# Patient Record
Sex: MALE | Race: WHITE | NOT HISPANIC OR LATINO | Employment: FULL TIME | URBAN - METROPOLITAN AREA
[De-identification: names, ages, dates, MRNs, and addresses within clinical notes are randomized per-mention and may not be internally consistent; named-entity substitution may affect disease eponyms.]

---

## 2017-03-22 RX ORDER — LISINOPRIL 10 MG/1
TABLET ORAL
Qty: 30 TABLET | Refills: 12 | Status: SHIPPED | OUTPATIENT
Start: 2017-03-22 | End: 2017-12-17 | Stop reason: SDUPTHER

## 2017-03-22 RX ORDER — SIMVASTATIN 20 MG/1
TABLET, FILM COATED ORAL
Qty: 30 TABLET | Refills: 12 | Status: SHIPPED | OUTPATIENT
Start: 2017-03-22 | End: 2017-12-17 | Stop reason: SDUPTHER

## 2017-05-25 DIAGNOSIS — Z00.00 ROUTINE GENERAL MEDICAL EXAMINATION AT A HEALTH CARE FACILITY: Primary | ICD-10-CM

## 2017-07-13 ENCOUNTER — HOSPITAL ENCOUNTER (OUTPATIENT)
Dept: CARDIOLOGY | Facility: CLINIC | Age: 77
Discharge: HOME | End: 2017-07-13
Payer: MEDICARE

## 2017-07-13 ENCOUNTER — HOSPITAL ENCOUNTER (OUTPATIENT)
Dept: RADIOLOGY | Facility: HOSPITAL | Age: 77
Discharge: HOME | End: 2017-07-13
Attending: INTERNAL MEDICINE
Payer: MEDICARE

## 2017-07-13 ENCOUNTER — OFFICE VISIT (OUTPATIENT)
Dept: PULMONOLOGY | Facility: CLINIC | Age: 77
End: 2017-07-13
Payer: MEDICARE

## 2017-07-13 ENCOUNTER — CLINICAL SUPPORT (OUTPATIENT)
Dept: INTERNAL MEDICINE | Facility: CLINIC | Age: 77
End: 2017-07-13
Payer: MEDICARE

## 2017-07-13 VITALS
BODY MASS INDEX: 26.41 KG/M2 | HEART RATE: 63 BPM | HEIGHT: 72 IN | SYSTOLIC BLOOD PRESSURE: 124 MMHG | DIASTOLIC BLOOD PRESSURE: 70 MMHG | WEIGHT: 195 LBS

## 2017-07-13 DIAGNOSIS — Z00.00 ROUTINE GENERAL MEDICAL EXAMINATION AT A HEALTH CARE FACILITY: Primary | ICD-10-CM

## 2017-07-13 DIAGNOSIS — Z13.1 SCREENING FOR DIABETES MELLITUS: ICD-10-CM

## 2017-07-13 DIAGNOSIS — E78.5 OTHER AND UNSPECIFIED HYPERLIPIDEMIA: ICD-10-CM

## 2017-07-13 DIAGNOSIS — Z12.5 SPECIAL SCREENING FOR MALIGNANT NEOPLASM OF PROSTATE: ICD-10-CM

## 2017-07-13 DIAGNOSIS — D64.9 ANEMIA, UNSPECIFIED: ICD-10-CM

## 2017-07-13 DIAGNOSIS — Z00.00 ROUTINE GENERAL MEDICAL EXAMINATION AT A HEALTH CARE FACILITY: ICD-10-CM

## 2017-07-13 DIAGNOSIS — E03.9 UNSPECIFIED HYPOTHYROIDISM: ICD-10-CM

## 2017-07-13 DIAGNOSIS — Z00.00 ANNUAL PHYSICAL EXAM: Primary | ICD-10-CM

## 2017-07-13 DIAGNOSIS — E74.89 OTHER SPECIFIED DISORDERS OF CARBOHYDRATE METABOLISM: ICD-10-CM

## 2017-07-13 LAB
ALBUMIN SERPL BCP-MCNC: 3.7 G/DL
ALP SERPL-CCNC: 63 U/L
ALT SERPL W/O P-5'-P-CCNC: 13 U/L
ANION GAP SERPL CALC-SCNC: 6 MMOL/L
AST SERPL-CCNC: 16 U/L
BILIRUB SERPL-MCNC: 0.8 MG/DL
BUN SERPL-MCNC: 14 MG/DL
CALCIUM SERPL-MCNC: 9.5 MG/DL
CHLORIDE SERPL-SCNC: 105 MMOL/L
CHOLEST/HDLC SERPL: 2.7 {RATIO}
CO2 SERPL-SCNC: 27 MMOL/L
COMPLEXED PSA SERPL-MCNC: <0.01 NG/ML
CREAT SERPL-MCNC: 0.8 MG/DL
ERYTHROCYTE [DISTWIDTH] IN BLOOD BY AUTOMATED COUNT: 12.3 %
EST. GFR  (AFRICAN AMERICAN): >60 ML/MIN/1.73 M^2
EST. GFR  (NON AFRICAN AMERICAN): >60 ML/MIN/1.73 M^2
ESTIMATED AVG GLUCOSE: 111 MG/DL
GLUCOSE SERPL-MCNC: 105 MG/DL
HBA1C MFR BLD HPLC: 5.5 %
HCT VFR BLD AUTO: 43.2 %
HDL/CHOLESTEROL RATIO: 36.9 %
HDLC SERPL-MCNC: 122 MG/DL
HDLC SERPL-MCNC: 45 MG/DL
HGB BLD-MCNC: 14.4 G/DL
LDLC SERPL CALC-MCNC: 63.4 MG/DL
MCH RBC QN AUTO: 32.4 PG
MCHC RBC AUTO-ENTMCNC: 33.3 %
MCV RBC AUTO: 97 FL
NONHDLC SERPL-MCNC: 77 MG/DL
PLATELET # BLD AUTO: 220 K/UL
PMV BLD AUTO: 10.9 FL
POTASSIUM SERPL-SCNC: 4.3 MMOL/L
PROT SERPL-MCNC: 6.6 G/DL
RBC # BLD AUTO: 4.44 M/UL
SODIUM SERPL-SCNC: 138 MMOL/L
TRIGL SERPL-MCNC: 68 MG/DL
TSH SERPL DL<=0.005 MIU/L-ACNC: 1.38 UIU/ML
WBC # BLD AUTO: 5.78 K/UL

## 2017-07-13 PROCEDURE — 99999 PR PBB SHADOW E&M-EST. PATIENT-LVL III: CPT | Mod: PBBFAC,,, | Performed by: INTERNAL MEDICINE

## 2017-07-13 PROCEDURE — 99213 OFFICE O/P EST LOW 20 MIN: CPT | Mod: PBBFAC,25 | Performed by: INTERNAL MEDICINE

## 2017-07-13 PROCEDURE — 99397 PER PM REEVAL EST PAT 65+ YR: CPT | Mod: S$PBB,,, | Performed by: INTERNAL MEDICINE

## 2017-07-13 PROCEDURE — 71020 XR CHEST PA AND LATERAL: CPT | Mod: 26,,, | Performed by: RADIOLOGY

## 2017-07-13 PROCEDURE — 93010 ELECTROCARDIOGRAM REPORT: CPT | Mod: S$PBB,,, | Performed by: INTERNAL MEDICINE

## 2017-07-13 NOTE — PROGRESS NOTES
Subjective:       Patient ID: Oliver Burk Jr. is a 77 y.o. male.    Chief Complaint: Annual Exam    HPI   76 yo  from [place] just celebrated his 77th birthday comes for a periodic check. He feels well , he had a mini discectomy by Dr. Aj several months ago and is doing well. He has radioactive  Seeds for low grade prostate cancer many years ago. He has moderate atophy of his right calf from spinal stenosis but still shoots 80 in gold.  No medical complaints today.  Review of Systems   Constitutional: Negative.    HENT: Negative.    Eyes: Negative.    Respiratory: Negative.    Cardiovascular: Negative.    Gastrointestinal: Negative.    Genitourinary: Negative.         S/P Prostate Cancer treated with radon seeds   Musculoskeletal: Positive for back pain.        S/P Mini discetomy    Mild spinal stenosis with atrophy of right gastrocnemius   Skin: Negative.         MOHS surgery for skin cancer   Neurological: Negative.    Psychiatric/Behavioral: Negative.    All other systems reviewed and are negative.      Objective:      Physical Exam   Constitutional: He is oriented to person, place, and time. He appears well-developed and well-nourished.   HENT:   Head: Normocephalic and atraumatic.   Right Ear: External ear normal.   Left Ear: External ear normal.   Eyes: Conjunctivae and EOM are normal. Pupils are equal, round, and reactive to light.   Neck: Normal range of motion. Neck supple.   Cardiovascular: Normal rate, regular rhythm and normal heart sounds.    Pulmonary/Chest: Effort normal and breath sounds normal.   Peak flow 550 l/min   Abdominal: Soft. Bowel sounds are normal.   Musculoskeletal: Normal range of motion.   Walks with a noticeable limp   Neurological: He is alert and oriented to person, place, and time. He has normal reflexes.   Skin: Skin is warm and dry.   Psychiatric: He has a normal mood and affect. His behavior is normal. Judgment and thought content normal.        Assessment:       No diagnosis found.    Plan:        Labs: all parameters are normal EKG is normal and Chest x-ray is clear. Healthy 76 yo Male. Rosalva Arora

## 2017-07-13 NOTE — LETTER
July 13, 2017    Oliver CAMRON Burk Jr.  [address] [address]             [facility] - Pulmonary Services  [address]  [facility] [address]  Phone: [phone] Dear Jonel,      Thank you for allowing me to serve you and perform your Executive Health exam on 7/13/2017. This letter will serve as a brief summary of the physical findings and laboratory/studies performed and recommendations at this time. Today's assessment is excellent in all respects. You are fit to enter your 78th year.          If you have any questions or concerns, please don't hesitate to call.    Sincerely,        Richar Patterson MD

## 2017-12-17 RX ORDER — LISINOPRIL 10 MG/1
TABLET ORAL
Qty: 30 TABLET | Refills: 12 | Status: SHIPPED | OUTPATIENT
Start: 2017-12-17 | End: 2018-12-18 | Stop reason: SDUPTHER

## 2017-12-17 RX ORDER — TAMSULOSIN HYDROCHLORIDE 0.4 MG/1
CAPSULE ORAL
Qty: 30 CAPSULE | Refills: 12 | Status: SHIPPED | OUTPATIENT
Start: 2017-12-17 | End: 2018-12-18 | Stop reason: SDUPTHER

## 2017-12-17 RX ORDER — SIMVASTATIN 20 MG/1
TABLET, FILM COATED ORAL
Qty: 30 TABLET | Refills: 12 | Status: SHIPPED | OUTPATIENT
Start: 2017-12-17 | End: 2018-12-18 | Stop reason: SDUPTHER

## 2018-05-18 DIAGNOSIS — Z00.00 ROUTINE GENERAL MEDICAL EXAMINATION AT A HEALTH CARE FACILITY: Primary | ICD-10-CM

## 2018-05-29 ENCOUNTER — HOSPITAL ENCOUNTER (OUTPATIENT)
Dept: RADIOLOGY | Facility: HOSPITAL | Age: 78
Discharge: HOME | End: 2018-05-29
Attending: INTERNAL MEDICINE
Payer: MEDICARE

## 2018-05-29 ENCOUNTER — CLINICAL SUPPORT (OUTPATIENT)
Dept: INTERNAL MEDICINE | Facility: CLINIC | Age: 78
End: 2018-05-29
Payer: MEDICARE

## 2018-05-29 ENCOUNTER — HOSPITAL ENCOUNTER (OUTPATIENT)
Dept: CARDIOLOGY | Facility: CLINIC | Age: 78
Discharge: HOME | End: 2018-05-29
Payer: MEDICARE

## 2018-05-29 ENCOUNTER — OFFICE VISIT (OUTPATIENT)
Dept: PULMONOLOGY | Facility: CLINIC | Age: 78
End: 2018-05-29
Payer: MEDICARE

## 2018-05-29 VITALS
HEART RATE: 60 BPM | SYSTOLIC BLOOD PRESSURE: 116 MMHG | WEIGHT: 192 LBS | BODY MASS INDEX: 26.88 KG/M2 | HEIGHT: 71 IN | DIASTOLIC BLOOD PRESSURE: 54 MMHG

## 2018-05-29 DIAGNOSIS — Z12.5 ENCOUNTER FOR SCREENING FOR MALIGNANT NEOPLASM OF PROSTATE: ICD-10-CM

## 2018-05-29 DIAGNOSIS — Z00.00 ROUTINE GENERAL MEDICAL EXAMINATION AT A HEALTH CARE FACILITY: ICD-10-CM

## 2018-05-29 DIAGNOSIS — R79.9 ABNORMAL FINDING OF BLOOD CHEMISTRY: ICD-10-CM

## 2018-05-29 DIAGNOSIS — Z00.00 ROUTINE GENERAL MEDICAL EXAMINATION AT A HEALTH CARE FACILITY: Primary | ICD-10-CM

## 2018-05-29 DIAGNOSIS — Z00.00 ANNUAL PHYSICAL EXAM: Primary | ICD-10-CM

## 2018-05-29 DIAGNOSIS — R73.03 PREDIABETES: ICD-10-CM

## 2018-05-29 LAB
ALBUMIN SERPL BCP-MCNC: 3.8 G/DL
ALP SERPL-CCNC: 60 U/L
ALT SERPL W/O P-5'-P-CCNC: 14 U/L
ANION GAP SERPL CALC-SCNC: 6 MMOL/L
AST SERPL-CCNC: 17 U/L
BILIRUB SERPL-MCNC: 0.7 MG/DL
BUN SERPL-MCNC: 17 MG/DL
CALCIUM SERPL-MCNC: 10 MG/DL
CHLORIDE SERPL-SCNC: 107 MMOL/L
CHOLEST SERPL-MCNC: 129 MG/DL
CHOLEST/HDLC SERPL: 2.8 {RATIO}
CO2 SERPL-SCNC: 27 MMOL/L
COMPLEXED PSA SERPL-MCNC: <0.01 NG/ML
CREAT SERPL-MCNC: 0.8 MG/DL
ERYTHROCYTE [DISTWIDTH] IN BLOOD BY AUTOMATED COUNT: 12 %
EST. GFR  (AFRICAN AMERICAN): >60 ML/MIN/1.73 M^2
EST. GFR  (NON AFRICAN AMERICAN): >60 ML/MIN/1.73 M^2
ESTIMATED AVG GLUCOSE: 108 MG/DL
GLUCOSE SERPL-MCNC: 97 MG/DL
HBA1C MFR BLD HPLC: 5.4 %
HCT VFR BLD AUTO: 42 %
HDLC SERPL-MCNC: 46 MG/DL
HDLC SERPL: 35.7 %
HGB BLD-MCNC: 13.7 G/DL
LDLC SERPL CALC-MCNC: 74 MG/DL
MCH RBC QN AUTO: 32.5 PG
MCHC RBC AUTO-ENTMCNC: 32.6 G/DL
MCV RBC AUTO: 100 FL
NONHDLC SERPL-MCNC: 83 MG/DL
PLATELET # BLD AUTO: 227 K/UL
PMV BLD AUTO: 11 FL
POTASSIUM SERPL-SCNC: 5.3 MMOL/L
PROT SERPL-MCNC: 6.6 G/DL
RBC # BLD AUTO: 4.21 M/UL
SODIUM SERPL-SCNC: 140 MMOL/L
TRIGL SERPL-MCNC: 45 MG/DL
TSH SERPL DL<=0.005 MIU/L-ACNC: 1.53 UIU/ML
WBC # BLD AUTO: 5.35 K/UL

## 2018-05-29 PROCEDURE — 80061 LIPID PANEL: CPT

## 2018-05-29 PROCEDURE — 85027 COMPLETE CBC AUTOMATED: CPT

## 2018-05-29 PROCEDURE — 80053 COMPREHEN METABOLIC PANEL: CPT

## 2018-05-29 PROCEDURE — 84443 ASSAY THYROID STIM HORMONE: CPT

## 2018-05-29 PROCEDURE — 71046 X-RAY EXAM CHEST 2 VIEWS: CPT | Mod: TC,FY

## 2018-05-29 PROCEDURE — 99999 PR PBB SHADOW E&M-EST. PATIENT-LVL III: CPT | Mod: PBBFAC,,, | Performed by: INTERNAL MEDICINE

## 2018-05-29 PROCEDURE — 84153 ASSAY OF PSA TOTAL: CPT

## 2018-05-29 PROCEDURE — 83036 HEMOGLOBIN GLYCOSYLATED A1C: CPT

## 2018-05-29 PROCEDURE — 99397 PER PM REEVAL EST PAT 65+ YR: CPT | Mod: S$PBB,,, | Performed by: INTERNAL MEDICINE

## 2018-05-29 PROCEDURE — 93005 ELECTROCARDIOGRAM TRACING: CPT | Mod: PBBFAC | Performed by: INTERNAL MEDICINE

## 2018-05-29 PROCEDURE — 99213 OFFICE O/P EST LOW 20 MIN: CPT | Mod: PBBFAC,25 | Performed by: INTERNAL MEDICINE

## 2018-05-29 PROCEDURE — 71046 X-RAY EXAM CHEST 2 VIEWS: CPT | Mod: 26,,, | Performed by: RADIOLOGY

## 2018-05-29 PROCEDURE — 93010 ELECTROCARDIOGRAM REPORT: CPT | Mod: S$PBB,,, | Performed by: INTERNAL MEDICINE

## 2018-05-29 NOTE — LETTER
May 29, 2018    Oliver CAMRON Burk Jr.  [address] [address]             [facility] - Pulmonary Services  [address]  [facility] [address]  Phone: [phone] Dear Jonel      Thank you for allowing me to serve you and perform your Executive Health exam on 5/29/2018. This letter will serve as a brief summary of the physical findings and laboratory/studies performed and recommendations at this time. Today's assessment is essentially normal except for the weakness in the lower extremity.  Fit for travel to [place].          If you have any questions or concerns, please don't hesitate to call.    Sincerely,        Richar Patterson MD

## 2018-05-29 NOTE — PROGRESS NOTES
Subjective:       Patient ID: Oliver Burk Jr. is a 77 y.o. male.    Chief Complaint: Annual Exam    HPI  76 yo  and long time friend comes for his periodic health exam. He feels well, exercise daily between gold and using the exercise facility at the [facility]> He has no active medical complaints. He has some problem with balance and a limp due to atrophy of his calf muscle on the right., had a minin discectomy by   last year with good results.This seems to be a consequence of mild spinal stenosis. It does not affect his golf game. Has radon seed implants in [place] for Stage I prostate cancer.  Meds: Lisinopirl 10 mg Flomax 0.4 and Zocor.  Review of Systems   Constitutional: Negative.    HENT: Negative.    Eyes: Negative.    Respiratory: Negative.    Cardiovascular: Negative.    Gastrointestinal: Negative.    Genitourinary: Negative.         Radioactive seeds for small area of prostate cancer  Stage I   Musculoskeletal: Positive for gait problem.        Hx of mini discectomy by Dr. Aj    Atrophy of the right calf.   Skin: Negative.    Psychiatric/Behavioral: Negative.    All other systems reviewed and are negative.      Objective:      Physical Exam   Constitutional: He is oriented to person, place, and time. He appears well-developed and well-nourished.   HENT:   Head: Normocephalic and atraumatic.   Right Ear: External ear normal.   Left Ear: External ear normal.   Eyes: Conjunctivae and EOM are normal. Pupils are equal, round, and reactive to light.   Neck: Normal range of motion. Neck supple.   Cardiovascular: Normal rate, regular rhythm and normal heart sounds.    Pulmonary/Chest: Effort normal and breath sounds normal.   Abdominal: Soft. Bowel sounds are normal.   Musculoskeletal: Normal range of motion.   Atrophy of the right calf muscle   Neurological: He is alert and oriented to person, place, and time. He has normal reflexes.   Skin: Skin is warm and dry.   Psychiatric: He has a  normal mood and affect. His behavior is normal. Judgment and thought content normal.       Assessment:       No diagnosis found.    Plan:        Labs: All parameters are normal. PSA<0.05  Chest x-ray is clear and EKG is normal.IMP: Healthy Male with good health habits.

## 2018-07-24 ENCOUNTER — TELEPHONE (OUTPATIENT)
Dept: OPHTHALMOLOGY | Facility: CLINIC | Age: 78
End: 2018-07-24

## 2018-07-24 DIAGNOSIS — H26.9 CATARACT, UNSPECIFIED CATARACT TYPE, UNSPECIFIED LATERALITY: Primary | ICD-10-CM

## 2018-07-24 NOTE — TELEPHONE ENCOUNTER
----- Message from Glory Mitchell sent at 7/24/2018  3:10 PM CDT -----  Contact: Oliver Burk Jr.  Pt would like to speak with  nurse to scheduled the referral please as soon as possible,  pt can be reached at [phone] please thank you.

## 2018-10-05 DIAGNOSIS — S76.302A HAMSTRING INJURY, LEFT, INITIAL ENCOUNTER: Primary | ICD-10-CM

## 2018-10-10 ENCOUNTER — OFFICE VISIT (OUTPATIENT)
Dept: OPHTHALMOLOGY | Facility: CLINIC | Age: 78
End: 2018-10-10
Payer: MEDICARE

## 2018-10-10 DIAGNOSIS — H25.13 NUCLEAR SCLEROSIS, BILATERAL: Primary | ICD-10-CM

## 2018-10-10 PROCEDURE — 99999 PR PBB SHADOW E&M-EST. PATIENT-LVL II: CPT | Mod: PBBFAC,,, | Performed by: OPHTHALMOLOGY

## 2018-10-10 PROCEDURE — 99212 OFFICE O/P EST SF 10 MIN: CPT | Mod: PBBFAC | Performed by: OPHTHALMOLOGY

## 2018-10-10 PROCEDURE — 92004 COMPRE OPH EXAM NEW PT 1/>: CPT | Mod: S$PBB,,, | Performed by: OPHTHALMOLOGY

## 2018-10-10 RX ORDER — TETRACAINE HYDROCHLORIDE 5 MG/ML
1 SOLUTION OPHTHALMIC
Status: CANCELLED | OUTPATIENT
Start: 2018-10-10

## 2018-10-10 RX ORDER — PHENYLEPHRINE HYDROCHLORIDE 25 MG/ML
1 SOLUTION/ DROPS OPHTHALMIC
Status: CANCELLED | OUTPATIENT
Start: 2018-10-10

## 2018-10-10 RX ORDER — MOXIFLOXACIN 5 MG/ML
1 SOLUTION/ DROPS OPHTHALMIC
Status: CANCELLED | OUTPATIENT
Start: 2018-10-10

## 2018-10-10 RX ORDER — TROPICAMIDE 10 MG/ML
1 SOLUTION/ DROPS OPHTHALMIC
Status: CANCELLED | OUTPATIENT
Start: 2018-10-10

## 2018-10-10 NOTE — PROGRESS NOTES
HPI     Pt is here today for cataract evaluation OU.   Pt states that he has pretty good distance vision but up close and   computer is blurry. Pt denies any glare or halos around lights. No flashes   or floaters OU. No pain or discomfort OU. Pt will use tears just as   needed.    Last edited by Rosa Alfredo on 10/10/2018  9:11 AM. (History)            Assessment /Plan     For exam results, see Encounter Report.    Nuclear sclerosis, bilateral      Visually Significant Cataract: Patient reports decreased vision consistent with the clinical amount of lenticular opacity, which reaches the level of visual significance and affects activities of daily living. Risks, benefits, and alternatives to cataract surgery were discussed and the consent reviewed. IOL options were discussed, including ATIOLs and the associated side effects and additional patient cost associated with them.   IOL Selections:   Right eye  IOL: [id number] 22.0 at 180     Left eye  IOL: [id number] 22.0 at 180    Pt wishes to have right eye done first.  The patient expresses a desire to reduce spectacle dependence. I reviewed various IOL and LASER refractive surgical options and we will attempt to minimize spectacle dependence by managing astigmatism and optimizing IOL selection. Femtosecond LASER assisted cataract surgery (FLACS) technology was explained to the patient with educational videos and discussion.  The patient voices understanding and wishes to implement this technology during the cataract procedure.  I explained the increased precision of the LASER versus manual techniques, especially as it relates to astigmatism reduction with arcuate incisions.  I emphasized that although our goal is to reduce the need for refractive correction after surgery, there may still be a need for spectacle correction to achieve optimal visual acuity, and that a reasonable range of functional vision should be the expectation.  No guarantees are made about post operative  refraction or visual acuity, as the eye may heal in unpredictable ways, and the standard risks, benefits, and alternatives to cataract surgery were explained.  The patient understands that the refractive portions of this cataract procedure are not covered by insurance, and that there is an out of pocket expense of $1500 per eye. I also explained that even though our pre-operative plan is to utilize advanced refractive technologies during surgery, that I may decide to eliminate part or all of this plan if surgical challenges or complications arise, or I feel that it is not in the patient's best interest. Consent forms and an ABN form were given to the patient to review.    Catalys Parameters:  Right Eye:   STEPHAN:  12mm   ?Need to peter patient sitting up?: n  Capsulotomy: Scanned Capsule   rdGrdrrdarddrderd:rd rd3rd Arcuate: OFF  Incisions: OFF  Left Eye:   STEPHAN:  12mm   ?Need to peter patient sitting up?: n  Capsulotomy: Scanned Capsule  rdGrdrrdarddrderd:rd rd3rd Arcuate:  OFF  Incisions:  OFF

## 2018-10-10 NOTE — LETTER
October 10, 2018      Richar Patterson MD  [address]  [facility] [address]           [facility] - [facility]  [address]  [facility] [address]  Phone: [phone]  Fax: [phone]          Patient: Oliver Burk Jr.   MR Number: [id number]   YOB: 1940   Date of Visit: 10/10/2018       Dear Dr. Richar Benton Leonardo:    Thank you for referring Oliver Burk to me for evaluation. Attached you will find relevant portions of my assessment and plan of care.    If you have questions, please do not hesitate to call me. I look forward to following Oliver Burk along with you.    Sincerely,    Mu Mendez MD    Enclosure  CC:  No Recipients    If you would like to receive this communication electronically, please contact [email or web address] or [phone] to request more information on [employer] access.    For providers and/or their staff who would like to refer a patient to [facility], please contact us through our one-stop-shop provider referral line, [facility], at [phone].    If you feel you have received this communication in error or would no longer like to receive these types of communications, please e-mail [email or web address]

## 2018-11-20 RX ORDER — PREDNISOLONE ACETATE-GATIFLOXACIN-BROMFENAC .75; 5; 1 MG/ML; MG/ML; MG/ML
1 SUSPENSION/ DROPS OPHTHALMIC 3 TIMES DAILY
Qty: 7 ML | Refills: 3 | Status: SHIPPED | OUTPATIENT
Start: 2018-11-20 | End: 2019-03-26 | Stop reason: ALTCHOICE

## 2018-11-28 ENCOUNTER — TELEPHONE (OUTPATIENT)
Dept: OPHTHALMOLOGY | Facility: CLINIC | Age: 78
End: 2018-11-28

## 2018-11-28 NOTE — TELEPHONE ENCOUNTER
Patient frustrated he cannot get anyone on the line to arrange for delivery of his eye drops.  I advised I will have the pharmacy reach out to him this afternoon.

## 2018-11-29 ENCOUNTER — TELEPHONE (OUTPATIENT)
Dept: OPHTHALMOLOGY | Facility: CLINIC | Age: 78
End: 2018-11-29

## 2018-11-29 ENCOUNTER — TELEPHONE (OUTPATIENT)
Dept: NEUROLOGY | Facility: CLINIC | Age: 78
End: 2018-11-29

## 2018-11-29 DIAGNOSIS — H25.12 NUCLEAR SCLEROTIC CATARACT OF LEFT EYE: Primary | ICD-10-CM

## 2018-11-29 DIAGNOSIS — H25.11 NUCLEAR SCLEROTIC CATARACT OF RIGHT EYE: Primary | ICD-10-CM

## 2018-12-18 RX ORDER — TAMSULOSIN HYDROCHLORIDE 0.4 MG/1
CAPSULE ORAL
Qty: 30 CAPSULE | Refills: 12 | Status: SHIPPED | OUTPATIENT
Start: 2018-12-18 | End: 2020-02-19

## 2018-12-18 RX ORDER — LISINOPRIL 10 MG/1
TABLET ORAL
Qty: 30 TABLET | Refills: 12 | Status: SHIPPED | OUTPATIENT
Start: 2018-12-18 | End: 2019-11-25 | Stop reason: SDUPTHER

## 2018-12-18 RX ORDER — SIMVASTATIN 20 MG/1
TABLET, FILM COATED ORAL
Qty: 30 TABLET | Refills: 12 | Status: SHIPPED | OUTPATIENT
Start: 2018-12-18 | End: 2019-11-25 | Stop reason: SDUPTHER

## 2019-01-08 ENCOUNTER — TELEPHONE (OUTPATIENT)
Dept: OPHTHALMOLOGY | Facility: CLINIC | Age: 79
End: 2019-01-08

## 2019-01-08 NOTE — TELEPHONE ENCOUNTER
----- Message from Steven Desai sent at 1/7/2019  1:15 PM CST -----  Contact: Oliver Zepeda Wm would like for you to contact him concerning his upcoming surgery. He can be reached at [phone]

## 2019-01-10 ENCOUNTER — TELEPHONE (OUTPATIENT)
Dept: OPTOMETRY | Facility: CLINIC | Age: 79
End: 2019-01-10

## 2019-01-14 ENCOUNTER — ANESTHESIA (OUTPATIENT)
Dept: SURGERY | Facility: OTHER | Age: 79
End: 2019-01-14
Payer: MEDICARE

## 2019-01-14 ENCOUNTER — ANESTHESIA EVENT (OUTPATIENT)
Dept: SURGERY | Facility: OTHER | Age: 79
End: 2019-01-14
Payer: MEDICARE

## 2019-01-14 ENCOUNTER — HOSPITAL ENCOUNTER (OUTPATIENT)
Facility: OTHER | Age: 79
Discharge: HOME | End: 2019-01-14
Attending: OPHTHALMOLOGY | Admitting: OPHTHALMOLOGY
Payer: MEDICARE

## 2019-01-14 VITALS
DIASTOLIC BLOOD PRESSURE: 66 MMHG | OXYGEN SATURATION: 96 % | WEIGHT: 190 LBS | HEART RATE: 63 BPM | SYSTOLIC BLOOD PRESSURE: 135 MMHG | TEMPERATURE: 99 F | RESPIRATION RATE: 16 BRPM | HEIGHT: 72 IN | BODY MASS INDEX: 25.73 KG/M2

## 2019-01-14 DIAGNOSIS — H25.13 NUCLEAR SCLEROSIS, BILATERAL: ICD-10-CM

## 2019-01-14 PROCEDURE — 63600175 PHARM REV CODE 636 W HCPCS: Performed by: NURSE ANESTHETIST, CERTIFIED REGISTERED

## 2019-01-14 PROCEDURE — 66999 UNLISTED PX ANT SEGMENT EYE: CPT | Mod: ,,, | Performed by: OPHTHALMOLOGY

## 2019-01-14 PROCEDURE — 37000008 HC ANESTHESIA 1ST 15 MINUTES: Performed by: OPHTHALMOLOGY

## 2019-01-14 PROCEDURE — 25000003 PHARM REV CODE 250: Performed by: OPHTHALMOLOGY

## 2019-01-14 PROCEDURE — 71000015 HC POSTOP RECOV 1ST HR: Performed by: OPHTHALMOLOGY

## 2019-01-14 PROCEDURE — V2787 ASTIGMATISM-CORRECT FUNCTION: HCPCS | Performed by: OPHTHALMOLOGY

## 2019-01-14 PROCEDURE — 36000707: Performed by: OPHTHALMOLOGY

## 2019-01-14 PROCEDURE — 66999 PR FEMTO MFIOL: ICD-10-PCS | Mod: ,,, | Performed by: OPHTHALMOLOGY

## 2019-01-14 PROCEDURE — 37000009 HC ANESTHESIA EA ADD 15 MINS: Performed by: OPHTHALMOLOGY

## 2019-01-14 PROCEDURE — 36000706: Performed by: OPHTHALMOLOGY

## 2019-01-14 PROCEDURE — 66984 XCAPSL CTRC RMVL W/O ECP: CPT | Mod: RT,,, | Performed by: OPHTHALMOLOGY

## 2019-01-14 PROCEDURE — 66984 PR REMOVAL, CATARACT, W/INSRT INTRAOC LENS, W/O ENDO CYCLO: ICD-10-PCS | Mod: RT,,, | Performed by: OPHTHALMOLOGY

## 2019-01-14 DEVICE — IMPLANTABLE DEVICE: Type: IMPLANTABLE DEVICE | Site: EYE | Status: FUNCTIONAL

## 2019-01-14 RX ORDER — MIDAZOLAM HYDROCHLORIDE 1 MG/ML
INJECTION INTRAMUSCULAR; INTRAVENOUS
Status: DISCONTINUED | OUTPATIENT
Start: 2019-01-14 | End: 2019-01-14

## 2019-01-14 RX ORDER — TETRACAINE HYDROCHLORIDE 5 MG/ML
SOLUTION OPHTHALMIC
Status: DISCONTINUED | OUTPATIENT
Start: 2019-01-14 | End: 2019-01-14 | Stop reason: HOSPADM

## 2019-01-14 RX ORDER — LIDOCAINE HYDROCHLORIDE 40 MG/ML
INJECTION, SOLUTION RETROBULBAR
Status: DISCONTINUED | OUTPATIENT
Start: 2019-01-14 | End: 2019-01-14 | Stop reason: HOSPADM

## 2019-01-14 RX ORDER — TROPICAMIDE 10 MG/ML
1 SOLUTION/ DROPS OPHTHALMIC
Status: COMPLETED | OUTPATIENT
Start: 2019-01-14 | End: 2019-01-14

## 2019-01-14 RX ORDER — MOXIFLOXACIN 5 MG/ML
1 SOLUTION/ DROPS OPHTHALMIC
Status: COMPLETED | OUTPATIENT
Start: 2019-01-14 | End: 2019-01-14

## 2019-01-14 RX ORDER — PHENYLEPHRINE HYDROCHLORIDE 25 MG/ML
1 SOLUTION/ DROPS OPHTHALMIC
Status: COMPLETED | OUTPATIENT
Start: 2019-01-14 | End: 2019-01-14

## 2019-01-14 RX ORDER — MOXIFLOXACIN 5 MG/ML
SOLUTION/ DROPS OPHTHALMIC
Status: DISCONTINUED | OUTPATIENT
Start: 2019-01-14 | End: 2019-01-14 | Stop reason: HOSPADM

## 2019-01-14 RX ORDER — PHENYLEPHRINE HYDROCHLORIDE 100 MG/ML
SOLUTION/ DROPS OPHTHALMIC
Status: DISCONTINUED | OUTPATIENT
Start: 2019-01-14 | End: 2019-01-14 | Stop reason: HOSPADM

## 2019-01-14 RX ORDER — NAPROXEN SODIUM 220 MG
220 TABLET ORAL
COMMUNITY
End: 2023-02-23 | Stop reason: CLARIF

## 2019-01-14 RX ORDER — PROPARACAINE HYDROCHLORIDE 5 MG/ML
1 SOLUTION/ DROPS OPHTHALMIC
Status: DISCONTINUED | OUTPATIENT
Start: 2019-01-14 | End: 2019-01-14 | Stop reason: HOSPADM

## 2019-01-14 RX ORDER — ACETAMINOPHEN 325 MG/1
650 TABLET ORAL EVERY 4 HOURS PRN
Status: DISCONTINUED | OUTPATIENT
Start: 2019-01-14 | End: 2019-01-14 | Stop reason: HOSPADM

## 2019-01-14 RX ORDER — TETRACAINE HYDROCHLORIDE 5 MG/ML
1 SOLUTION OPHTHALMIC
Status: COMPLETED | OUTPATIENT
Start: 2019-01-14 | End: 2019-01-14

## 2019-01-14 RX ADMIN — TROPICAMIDE 1 DROP: 10 SOLUTION/ DROPS OPHTHALMIC at 11:01

## 2019-01-14 RX ADMIN — MIDAZOLAM HYDROCHLORIDE 2 MG: 1 INJECTION, SOLUTION INTRAMUSCULAR; INTRAVENOUS at 12:01

## 2019-01-14 RX ADMIN — MOXIFLOXACIN HYDROCHLORIDE 1 DROP: 5 SOLUTION/ DROPS OPHTHALMIC at 11:01

## 2019-01-14 RX ADMIN — MOXIFLOXACIN HYDROCHLORIDE 1 DROP: 5 SOLUTION/ DROPS OPHTHALMIC at 01:01

## 2019-01-14 RX ADMIN — TETRACAINE HYDROCHLORIDE 1 DROP: 5 SOLUTION OPHTHALMIC at 11:01

## 2019-01-14 RX ADMIN — MOXIFLOXACIN HYDROCHLORIDE 1 DROP: 5 SOLUTION/ DROPS OPHTHALMIC at 12:01

## 2019-01-14 RX ADMIN — PHENYLEPHRINE HYDROCHLORIDE 1 DROP: 25 SOLUTION/ DROPS OPHTHALMIC at 11:01

## 2019-01-14 NOTE — DISCHARGE INSTRUCTIONS
Home Care Instructions for Eye Surgeries    1. ACTIVITY:   Limit your activity today. Increase activity gradually. You may return to work or school as directed by your physician.    2. DIET:   Drink plenty of fluids. Resume your normal diet unless instructed otherwise.    3. PAIN:   Expect a moderate amount of pain. If a prescription for pain is not sent home with you, you may take your commonly used pain reliever as directed. If this is not sufficient, call your physician. You may resume any other prescription medication unless otherwise directed by your physician.     4. DRESSING:   Keep your dressing dry and intact.    5. COMMENTS:   No driving, operating heavy machinery or signing legal documents for 24 hours.    No bending forward at the waist.   See attached schedule of eye drops.    Discuss any problem with your physician as soon as it arises. Do not Delay.      EMERGENCY- If you are unable to contact your physician, please go to the nearest Emergency Room.         Anesthesia: Monitored Anesthesia Care (MAC)    Anesthesia Safety  · Have an adult family member or friend drive you home after the procedure.  · For the first 24 hours after your surgery:  ¨ Do not drive or use heavy equipment.  ¨ Do not make important decisions or sign documents.  ¨ Avoid alcohol.  ¨ Have someone stay with you, if possible. They can watch for problems and help keep you safe.    PLEASE FOLLOW ANY OTHER INSTRUCTIONS PROVIDED TO YOU BY DR. PATEL!

## 2019-01-14 NOTE — OR NURSING
Oliver Burk Jr. has met all discharge criteria from Phase II. Vital Signs are stable, ambulating  without difficulty. Discharge instructions given, patient verbalized understanding. Discharged from facility via wheelchair in stable condition.

## 2019-01-14 NOTE — ANESTHESIA POSTPROCEDURE EVALUATION
Anesthesia Post Evaluation    Patient: Oliver Burk JrIsaías    Procedure(s) Performed: Procedure(s) (LRB):  EXTRACTION, CATARACT, WITH IOL INSERTION (Right)    Final Anesthesia Type: MAC  Patient location during evaluation: [facility]  Patient participation: Yes- Able to Participate  Level of consciousness: awake and alert  Post-procedure vital signs: reviewed and stable  Pain management: adequate  Airway patency: patent  PONV status at discharge: No PONV  Anesthetic complications: no      Cardiovascular status: blood pressure returned to baseline  Respiratory status: unassisted, room air and spontaneous ventilation  Hydration status: euvolemic  Follow-up not needed.        Visit Vitals  BP (!) 155/69 (BP Location: Left arm, Patient Position: Lying)   Pulse 65   Temp 37.1 °C (98.8 °F) (Oral)   Resp 18   Ht 6' (1.829 m)   Wt 86.2 kg (190 lb)   SpO2 97%   BMI 25.77 kg/m²       Pain/Breonna Score: No Data Recorded

## 2019-01-14 NOTE — ANESTHESIA PREPROCEDURE EVALUATION
01/14/2019  Oliver Burk Jr. is a 78 y.o., male.    Anesthesia Evaluation    I have reviewed the Patient Summary Reports.    I have reviewed the Nursing Notes.   I have reviewed the Medications.     Review of Systems  Anesthesia Hx:  No problems with previous Anesthesia    Social:  Non-Smoker    Cardiovascular:   Hypertension, well controlled    Pulmonary:  Pulmonary Normal    Hepatic/GI:  Hepatic/GI Normal    Neurological:   Neuromuscular Disease,    Endocrine:  Endocrine Normal        Physical Exam  General:  Well nourished    Airway/Jaw/Neck:  Airway Findings: Mouth Opening: Normal Tongue: Normal  General Airway Assessment: Adult  Mallampati: II  TM Distance: Normal, at least 6 cm  Jaw/Neck Findings:  Neck ROM: Normal ROM      Dental:  Dental Findings: In tact             Anesthesia Plan  Type of Anesthesia, risks & benefits discussed:  Anesthesia Type:  MAC  Patient's Preference:   Intra-op Monitoring Plan:   Intra-op Monitoring Plan Comments:   Post Op Pain Control Plan:   Post Op Pain Control Plan Comments:   Induction:   IV  Beta Blocker:         Informed Consent: Patient understands risks and agrees with Anesthesia plan.  Questions answered. Anesthesia consent signed with patient.  ASA Score: 2     Day of Surgery Review of History & Physical:    H&P update referred to the surgeon.         Ready For Surgery From Anesthesia Perspective.

## 2019-01-14 NOTE — OP NOTE
SURGEON:  Mu Mendez M.D.    PREOPERATIVE DIAGNOSIS:    Nuclear Sclerotic Cataract Right Eye    POSTOPERATIVE DIAGNOSIS:    Nuclear Sclerotic Cataract Right Eye    PROCEDURES:    Phacoemulsification with  intraocular lens, Right eye (04906)  With Femtosecond LASER assist    DATE OF SURGERY: 01/14/2019    IMPLANT: [id number] 22.0    ANESTHESIA:  MAC with topical Lidocaine    COMPLICATIONS:  None    ESTIMATED BLOOD LOSS: None    SPECIMENS: None    INDICATIONS:    The patient has a history of painless progressive visual loss and  difficulty with activities of daily living secondary to cataract formation.  After a thorough discussion of the risks, benefits, and alternatives to cataract surgery, including, but not limited to, the rare risks of infection, retinal detachment, hemorrhage, need for additional surgery, loss of vision, and even loss of the eye, the patient voices understanding and desires to proceed.    DESCRIPTION OF PROCEDURE:    After verification of consent and marking of the operative eye, the patient was positioned under the femtosecond LASER. Topical anesthetic drops were administered. A surgical timeout was initiated with verification of patient identifiers and the laser surgical plan. The eye was docked securely and the laser portion of the cataract procedure was carried out without complication.  The patient was returned to the pre-operative area to await the intraocular surgical portion of the cataract procedure.  The patients IOL calculations were reviewed, and the lens selection confirmed.   After verification and marking of the proper eye in the preop holding area, the patient was brought to the operating room in supine position where the eye was prepped and draped in standard sterile fashion with 5% Betadine and a lid speculum placed in the eye.   Topical 4% Lidocaine was used in addition to the preoperative anesthesia and the procedure was begun by the creation of a paracentesis incision through  which viscoelastic was used to fill the anterior chamber.  Next, a keratome blade was used to create a triplanar temporal clear corneal incision and a cystotome and Utrata forceps used to fashion a continuous curvilinear capsulorrhexis.  Hydrodissection was carried out using the [place] hydrodissection cannula and the nucleus was found to be mobile.  Phacoemulsification of the nucleus was carried out using a quick chop technique, and all remaining epinuclear and cortical material was removed.  The eye was then reformed with Viscoelastic and the  intraocular lens was implanted into the capsular bag.  All remaining viscoelastics were removed from the eye and at the end of the case the pupil was round, the lens was well-centered within the capsular bag and all wounds were found to be water tight.  Drops of Vigamox and Pred Forte were instilled and a shield was placed over the eye. The patient will follow up with Dr. Mendez in the morning.

## 2019-01-15 ENCOUNTER — OFFICE VISIT (OUTPATIENT)
Dept: OPHTHALMOLOGY | Facility: CLINIC | Age: 79
End: 2019-01-15
Attending: OPHTHALMOLOGY
Payer: MEDICARE

## 2019-01-15 DIAGNOSIS — H25.12 NUCLEAR SCLEROSIS OF LEFT EYE: ICD-10-CM

## 2019-01-15 DIAGNOSIS — Z98.890 POST-OPERATIVE STATE: Primary | ICD-10-CM

## 2019-01-15 PROCEDURE — 99999 PR PBB SHADOW E&M-EST. PATIENT-LVL II: CPT | Mod: PBBFAC,,, | Performed by: OPHTHALMOLOGY

## 2019-01-15 PROCEDURE — 99212 OFFICE O/P EST SF 10 MIN: CPT | Mod: PBBFAC | Performed by: OPHTHALMOLOGY

## 2019-01-15 PROCEDURE — 99999 PR PBB SHADOW E&M-EST. PATIENT-LVL II: ICD-10-PCS | Mod: PBBFAC,,, | Performed by: OPHTHALMOLOGY

## 2019-01-15 PROCEDURE — 99024 PR POST-OP FOLLOW-UP VISIT: ICD-10-PCS | Mod: POP,,, | Performed by: OPHTHALMOLOGY

## 2019-01-15 PROCEDURE — 99024 POSTOP FOLLOW-UP VISIT: CPT | Mod: POP,,, | Performed by: OPHTHALMOLOGY

## 2019-01-15 RX ORDER — TETRACAINE HYDROCHLORIDE 5 MG/ML
1 SOLUTION OPHTHALMIC
Status: CANCELLED | OUTPATIENT
Start: 2019-01-15

## 2019-01-15 RX ORDER — PHENYLEPHRINE HYDROCHLORIDE 25 MG/ML
1 SOLUTION/ DROPS OPHTHALMIC
Status: CANCELLED | OUTPATIENT
Start: 2019-01-15

## 2019-01-15 RX ORDER — TROPICAMIDE 10 MG/ML
1 SOLUTION/ DROPS OPHTHALMIC
Status: CANCELLED | OUTPATIENT
Start: 2019-01-15

## 2019-01-15 RX ORDER — MOXIFLOXACIN 5 MG/ML
1 SOLUTION/ DROPS OPHTHALMIC
Status: CANCELLED | OUTPATIENT
Start: 2019-01-15

## 2019-01-15 NOTE — PROGRESS NOTES
HPI     1 day post op CE with IOL OD (Symfony) (1/14/2019).      Last edited by Rosa Alfredo on 1/15/2019  9:35 AM. (History)            Assessment /Plan     For exam results, see Encounter Report.    Post-operative state      Slit lamp exam:  L/L: nl  K: clear, wound sealed  AC: 1+ cell  Lens: IOL centered and stable    POD1 s/p Phaco/IOL  Appropriate precautions and post op medications reviewed.  Patient instructed to call or come in if symptoms of redness, decreased vision, or pain are experienced.     Left eye  IOL: [id number] 22.0 at 180    The patient expresses a desire to reduce spectacle dependence. I reviewed various IOL and LASER refractive surgical options and we will attempt to minimize spectacle dependence by managing astigmatism and optimizing IOL selection. Femtosecond LASER assisted cataract surgery (FLACS) technology was explained to the patient with educational videos and discussion.  The patient voices understanding and wishes to implement this technology during the cataract procedure.  I explained the increased precision of the LASER versus manual techniques, especially as it relates to astigmatism reduction with arcuate incisions.  I emphasized that although our goal is to reduce the need for refractive correction after surgery, there may still be a need for spectacle correction to achieve optimal visual acuity, and that a reasonable range of functional vision should be the expectation.  No guarantees are made about post operative refraction or visual acuity, as the eye may heal in unpredictable ways, and the standard risks, benefits, and alternatives to cataract surgery were explained.  The patient understands that the refractive portions of this cataract procedure are not covered by insurance, and that there is an out of pocket expense of $1500 per eye. I also explained that even though our pre-operative plan is to utilize advanced refractive technologies during surgery, that I may decide to  eliminate part or all of this plan if surgical challenges or complications arise, or I feel that it is not in the patient's best interest. Consent forms and an ABN form were given to the patient to review.    Catalys Parameters:    Left Eye:   STEPHAN:  12mm   ?Need to peter patient sitting up?: n  Capsulotomy: Scanned Capsule  rdGrdrrdarddrderd:rd rd3rd Arcuate:  OFF  Incisions:  OFF

## 2019-01-15 NOTE — H&P (VIEW-ONLY)
HPI     1 day post op CE with IOL OD (Symfony) (1/14/2019).      Last edited by Rosa Alfredo on 1/15/2019  9:35 AM. (History)            Assessment /Plan     For exam results, see Encounter Report.    Post-operative state      Slit lamp exam:  L/L: nl  K: clear, wound sealed  AC: 1+ cell  Lens: IOL centered and stable    POD1 s/p Phaco/IOL  Appropriate precautions and post op medications reviewed.  Patient instructed to call or come in if symptoms of redness, decreased vision, or pain are experienced.     Left eye  IOL: [id number] 22.0 at 180    The patient expresses a desire to reduce spectacle dependence. I reviewed various IOL and LASER refractive surgical options and we will attempt to minimize spectacle dependence by managing astigmatism and optimizing IOL selection. Femtosecond LASER assisted cataract surgery (FLACS) technology was explained to the patient with educational videos and discussion.  The patient voices understanding and wishes to implement this technology during the cataract procedure.  I explained the increased precision of the LASER versus manual techniques, especially as it relates to astigmatism reduction with arcuate incisions.  I emphasized that although our goal is to reduce the need for refractive correction after surgery, there may still be a need for spectacle correction to achieve optimal visual acuity, and that a reasonable range of functional vision should be the expectation.  No guarantees are made about post operative refraction or visual acuity, as the eye may heal in unpredictable ways, and the standard risks, benefits, and alternatives to cataract surgery were explained.  The patient understands that the refractive portions of this cataract procedure are not covered by insurance, and that there is an out of pocket expense of $1500 per eye. I also explained that even though our pre-operative plan is to utilize advanced refractive technologies during surgery, that I may decide to  eliminate part or all of this plan if surgical challenges or complications arise, or I feel that it is not in the patient's best interest. Consent forms and an ABN form were given to the patient to review.    Catalys Parameters:    Left Eye:   STEPHAN:  12mm   ?Need to peter patient sitting up?: n  Capsulotomy: Scanned Capsule  stGstrstastdstest:st st1st Arcuate:  OFF  Incisions:  OFF

## 2019-01-24 ENCOUNTER — TELEPHONE (OUTPATIENT)
Dept: OPTOMETRY | Facility: CLINIC | Age: 79
End: 2019-01-24

## 2019-01-28 ENCOUNTER — HOSPITAL ENCOUNTER (OUTPATIENT)
Facility: OTHER | Age: 79
Discharge: HOME | End: 2019-01-28
Attending: OPHTHALMOLOGY | Admitting: OPHTHALMOLOGY
Payer: MEDICARE

## 2019-01-28 ENCOUNTER — ANESTHESIA EVENT (OUTPATIENT)
Dept: SURGERY | Facility: OTHER | Age: 79
End: 2019-01-28
Payer: MEDICARE

## 2019-01-28 ENCOUNTER — ANESTHESIA (OUTPATIENT)
Dept: SURGERY | Facility: OTHER | Age: 79
End: 2019-01-28
Payer: MEDICARE

## 2019-01-28 VITALS
TEMPERATURE: 98 F | WEIGHT: 190 LBS | DIASTOLIC BLOOD PRESSURE: 64 MMHG | RESPIRATION RATE: 16 BRPM | BODY MASS INDEX: 25.73 KG/M2 | OXYGEN SATURATION: 96 % | SYSTOLIC BLOOD PRESSURE: 107 MMHG | HEIGHT: 72 IN | HEART RATE: 63 BPM

## 2019-01-28 DIAGNOSIS — Z98.890 POST-OPERATIVE STATE: ICD-10-CM

## 2019-01-28 DIAGNOSIS — H25.12 NUCLEAR SCLEROSIS OF LEFT EYE: ICD-10-CM

## 2019-01-28 DIAGNOSIS — H25.12 NUCLEAR SCLEROTIC CATARACT OF LEFT EYE: Primary | ICD-10-CM

## 2019-01-28 PROCEDURE — 66984 XCAPSL CTRC RMVL W/O ECP: CPT | Mod: 79,LT,, | Performed by: OPHTHALMOLOGY

## 2019-01-28 PROCEDURE — 66999 UNLISTED PX ANT SEGMENT EYE: CPT | Mod: ,,, | Performed by: OPHTHALMOLOGY

## 2019-01-28 PROCEDURE — 66999 PR FEMTO MFIOL: ICD-10-PCS | Mod: ,,, | Performed by: OPHTHALMOLOGY

## 2019-01-28 PROCEDURE — 71000015 HC POSTOP RECOV 1ST HR: Performed by: OPHTHALMOLOGY

## 2019-01-28 PROCEDURE — V2787 ASTIGMATISM-CORRECT FUNCTION: HCPCS | Performed by: OPHTHALMOLOGY

## 2019-01-28 PROCEDURE — 36000707: Performed by: OPHTHALMOLOGY

## 2019-01-28 PROCEDURE — 37000009 HC ANESTHESIA EA ADD 15 MINS: Performed by: OPHTHALMOLOGY

## 2019-01-28 PROCEDURE — 25000003 PHARM REV CODE 250: Performed by: OPHTHALMOLOGY

## 2019-01-28 PROCEDURE — 36000706: Performed by: OPHTHALMOLOGY

## 2019-01-28 PROCEDURE — 37000008 HC ANESTHESIA 1ST 15 MINUTES: Performed by: OPHTHALMOLOGY

## 2019-01-28 PROCEDURE — 66984 PR REMOVAL, CATARACT, W/INSRT INTRAOC LENS, W/O ENDO CYCLO: ICD-10-PCS | Mod: 79,LT,, | Performed by: OPHTHALMOLOGY

## 2019-01-28 PROCEDURE — 63600175 PHARM REV CODE 636 W HCPCS: Performed by: NURSE ANESTHETIST, CERTIFIED REGISTERED

## 2019-01-28 DEVICE — IMPLANTABLE DEVICE: Type: IMPLANTABLE DEVICE | Site: EYE | Status: FUNCTIONAL

## 2019-01-28 RX ORDER — TETRACAINE HYDROCHLORIDE 5 MG/ML
SOLUTION OPHTHALMIC
Status: DISCONTINUED | OUTPATIENT
Start: 2019-01-28 | End: 2019-01-28 | Stop reason: HOSPADM

## 2019-01-28 RX ORDER — TETRACAINE HYDROCHLORIDE 5 MG/ML
1 SOLUTION OPHTHALMIC
Status: COMPLETED | OUTPATIENT
Start: 2019-01-28 | End: 2019-01-28

## 2019-01-28 RX ORDER — MIDAZOLAM HYDROCHLORIDE 1 MG/ML
INJECTION INTRAMUSCULAR; INTRAVENOUS
Status: DISCONTINUED | OUTPATIENT
Start: 2019-01-28 | End: 2019-01-28

## 2019-01-28 RX ORDER — LIDOCAINE HYDROCHLORIDE 40 MG/ML
INJECTION, SOLUTION RETROBULBAR
Status: DISCONTINUED | OUTPATIENT
Start: 2019-01-28 | End: 2019-01-28 | Stop reason: HOSPADM

## 2019-01-28 RX ORDER — MOXIFLOXACIN 5 MG/ML
1 SOLUTION/ DROPS OPHTHALMIC
Status: COMPLETED | OUTPATIENT
Start: 2019-01-28 | End: 2019-01-28

## 2019-01-28 RX ORDER — MOXIFLOXACIN 5 MG/ML
SOLUTION/ DROPS OPHTHALMIC
Status: DISCONTINUED | OUTPATIENT
Start: 2019-01-28 | End: 2019-01-28 | Stop reason: HOSPADM

## 2019-01-28 RX ORDER — TROPICAMIDE 10 MG/ML
1 SOLUTION/ DROPS OPHTHALMIC
Status: COMPLETED | OUTPATIENT
Start: 2019-01-28 | End: 2019-01-28

## 2019-01-28 RX ORDER — ACETAMINOPHEN 325 MG/1
650 TABLET ORAL EVERY 4 HOURS PRN
Status: DISCONTINUED | OUTPATIENT
Start: 2019-01-28 | End: 2019-01-28 | Stop reason: HOSPADM

## 2019-01-28 RX ORDER — PROPARACAINE HYDROCHLORIDE 5 MG/ML
1 SOLUTION/ DROPS OPHTHALMIC
Status: DISCONTINUED | OUTPATIENT
Start: 2019-01-28 | End: 2019-01-28 | Stop reason: HOSPADM

## 2019-01-28 RX ORDER — PHENYLEPHRINE HYDROCHLORIDE 100 MG/ML
SOLUTION/ DROPS OPHTHALMIC
Status: DISCONTINUED | OUTPATIENT
Start: 2019-01-28 | End: 2019-01-28 | Stop reason: HOSPADM

## 2019-01-28 RX ORDER — PHENYLEPHRINE HYDROCHLORIDE 25 MG/ML
1 SOLUTION/ DROPS OPHTHALMIC
Status: COMPLETED | OUTPATIENT
Start: 2019-01-28 | End: 2019-01-28

## 2019-01-28 RX ADMIN — TETRACAINE HYDROCHLORIDE 1 DROP: 5 SOLUTION OPHTHALMIC at 09:01

## 2019-01-28 RX ADMIN — PHENYLEPHRINE HYDROCHLORIDE 1 DROP: 25 SOLUTION/ DROPS OPHTHALMIC at 09:01

## 2019-01-28 RX ADMIN — TROPICAMIDE 1 DROP: 10 SOLUTION/ DROPS OPHTHALMIC at 09:01

## 2019-01-28 RX ADMIN — MOXIFLOXACIN 1 DROP: 5 SOLUTION/ DROPS OPHTHALMIC at 09:01

## 2019-01-28 RX ADMIN — MOXIFLOXACIN 1 DROP: 5 SOLUTION/ DROPS OPHTHALMIC at 11:01

## 2019-01-28 RX ADMIN — MIDAZOLAM HYDROCHLORIDE 2 MG: 1 INJECTION, SOLUTION INTRAMUSCULAR; INTRAVENOUS at 10:01

## 2019-01-28 NOTE — DISCHARGE INSTRUCTIONS
Mu Mendez MD  [facility]  Department of Ophthalmology      AFTER: Cataract Surgery:  Relax at home and DO NOT exert yourself for the rest of the day.  Plan to see Dr. Mendez tomorrow at the eye clinic:   [facility]  [place] [address]    Refer to attached eye drop instruction sheet     Precautions:  DO NOT rub your eye.  You may resume moderate activity the day after surgery.  Wear protective sunglasses during the day and a shield at night for 1(one) week.  If you have pain, redness and decreased vision, call Dr. Mendez (or the on-call doctor after hours) @[phone].            Home Care Instructions for Eye Surgeries    1. ACTIVITY:  Limit your activity today. Relax at home and DO NOT exert yourself for the rest of the day. Increase activity gradually. You may return to work or school as directed by your physician.    2. DIET:  Drink plenty of fluids. Resume your normal diet unless instructed otherwise.    3. PAIN:  Expect a moderate amount of pain. If a prescription for pain is not sent home with you, you may take your commonly used pain reliever as directed. If this is not sufficient, call your physician. You may resume any other prescription medication unless otherwise directed by your physician.     Discuss any problem with your physician as soon as it arises. Do not Delay.      EMERGENCY- If you are unable to contact your physician, please go to the nearest Emergency Room.       Anesthesia: Monitored Anesthesia Care (MAC)    Anesthesia Safety  · Have an adult family member or friend drive you home after the procedure.  · For the first 24 hours after your surgery:  · Do not drive or use heavy equipment.  · Do not make important decisions or sign documents.  · Avoid alcohol.  · Have someone stay with you, if possible. They can watch for problems and help keep you safe.

## 2019-01-28 NOTE — ANESTHESIA POSTPROCEDURE EVALUATION
Anesthesia Post Evaluation    Patient: Oliver Burk JrIsaías    Procedure(s) Performed: Procedure(s) (LRB):  EXTRACTION, CATARACT, WITH IOL INSERTION (Left)    Final Anesthesia Type: MAC  Patient location during evaluation: [employer]  Patient participation: Yes- Able to Participate  Level of consciousness: awake and alert  Post-procedure vital signs: reviewed and stable  Pain management: adequate  Airway patency: patent  PONV status at discharge: No PONV  Anesthetic complications: no      Cardiovascular status: blood pressure returned to baseline  Respiratory status: unassisted and spontaneous ventilation  Hydration status: euvolemic  Follow-up not needed.        Visit Vitals  Ht 6' (1.829 m)   Wt 86.2 kg (190 lb)   BMI 25.77 kg/m²       Pain/Breonna Score: No Data Recorded

## 2019-01-28 NOTE — OP NOTE
SURGEON:  Mu Mendez M.D.    PREOPERATIVE DIAGNOSIS:    Nuclear Sclerotic Cataract Left Eye    POSTOPERATIVE DIAGNOSIS:    Nuclear Sclerotic Cataract Left Eye    PROCEDURES:    Phacoemulsification with  intraocular lens, Left eye (18772)  With Femtosecond LASER assist    DATE OF SURGERY: 01/28/2019    IMPLANT: [id number] 22.0    ANESTHESIA:  MAC with topical Lidocaine    COMPLICATIONS:  None    ESTIMATED BLOOD LOSS: None    SPECIMENS: None    INDICATIONS:    The patient has a history of painless progressive visual loss and  difficulty with activities of daily living secondary to cataract formation.  After a thorough discussion of the risks, benefits, and alternatives to cataract surgery, including, but not limited to, the rare risks of infection, retinal detachment, hemorrhage, need for additional surgery, loss of vision, and even loss of the eye, the patient voices understanding and desires to proceed.    DESCRIPTION OF PROCEDURE:    After verification of consent and marking of the operative eye, the patient was positioned under the femtosecond LASER. Topical anesthetic drops were administered. A surgical timeout was initiated with verification of patient identifiers and the laser surgical plan. The eye was docked securely and the laser portion of the cataract procedure was carried out without complication.  The patient was returned to the pre-operative area to await the intraocular surgical portion of the cataract procedure.  The patients IOL calculations were reviewed, and the lens selection confirmed.   After verification and marking of the proper eye in the preop holding area, the patient was brought to the operating room in supine position where the eye was prepped and draped in standard sterile fashion with 5% Betadine and a lid speculum placed in the eye.   Topical 4% Lidocaine was used in addition to the preoperative anesthesia and the procedure was begun by the creation of a paracentesis incision through  which viscoelastic was used to fill the anterior chamber.  Next, a keratome blade was used to create a triplanar temporal clear corneal incision and a cystotome and Utrata forceps used to fashion a continuous curvilinear capsulorrhexis.  Hydrodissection was carried out using the [place] hydrodissection cannula and the nucleus was found to be mobile.  Phacoemulsification of the nucleus was carried out using a quick chop technique, and all remaining epinuclear and cortical material was removed.  The eye was then reformed with Viscoelastic and the  intraocular lens was implanted into the capsular bag.  All remaining viscoelastics were removed from the eye and at the end of the case the pupil was round, the lens was well-centered within the capsular bag and all wounds were found to be water tight.  Drops of Vigamox and Pred Forte were instilled and a shield was placed over the eye. The patient will follow up with Dr. Mendez in the morning.

## 2019-01-28 NOTE — PLAN OF CARE
Patient states his wife Brenda Burk will be avaiable to pick patient up she can be reached at [phone]

## 2019-01-28 NOTE — ANESTHESIA PREPROCEDURE EVALUATION
01/28/2019  Oliver Burk Jr. is a 78 y.o., male.    Pre-op Assessment    I have reviewed the Patient Summary Reports.     I have reviewed the Nursing Notes.   I have reviewed the Medications.     Review of Systems  Anesthesia Hx:  No problems with previous Anesthesia    Social:  Non-Smoker    Cardiovascular:   Hypertension, well controlled    Pulmonary:  Pulmonary Normal    Hepatic/GI:  Hepatic/GI Normal    Neurological:   Neuromuscular Disease,    Endocrine:  Endocrine Normal        Physical Exam  General:  Well nourished    Airway/Jaw/Neck:  Airway Findings: Mouth Opening: Normal Tongue: Normal  General Airway Assessment: Adult  Mallampati: II  TM Distance: Normal, at least 6 cm  Jaw/Neck Findings:  Neck ROM: Normal ROM      Dental:  Dental Findings: In tact             Anesthesia Plan  Type of Anesthesia, risks & benefits discussed:  Anesthesia Type:  MAC  Patient's Preference:   Intra-op Monitoring Plan:   Intra-op Monitoring Plan Comments:   Post Op Pain Control Plan:   Post Op Pain Control Plan Comments:   Induction:   IV  Beta Blocker:         Informed Consent: Patient understands risks and agrees with Anesthesia plan.  Questions answered. Anesthesia consent signed with patient.  ASA Score: 2     Day of Surgery Review of History & Physical:    H&P update referred to the surgeon.         Ready For Surgery From Anesthesia Perspective.

## 2019-01-29 ENCOUNTER — OFFICE VISIT (OUTPATIENT)
Dept: OPHTHALMOLOGY | Facility: CLINIC | Age: 79
End: 2019-01-29
Attending: OPHTHALMOLOGY
Payer: MEDICARE

## 2019-01-29 DIAGNOSIS — Z98.890 POST-OPERATIVE STATE: Primary | ICD-10-CM

## 2019-01-29 DIAGNOSIS — H25.12 NUCLEAR SCLEROSIS OF LEFT EYE: ICD-10-CM

## 2019-01-29 PROCEDURE — 99212 OFFICE O/P EST SF 10 MIN: CPT | Mod: PBBFAC | Performed by: OPHTHALMOLOGY

## 2019-01-29 PROCEDURE — 99024 PR POST-OP FOLLOW-UP VISIT: ICD-10-PCS | Mod: POP,,, | Performed by: OPHTHALMOLOGY

## 2019-01-29 PROCEDURE — 99999 PR PBB SHADOW E&M-EST. PATIENT-LVL II: ICD-10-PCS | Mod: PBBFAC,,, | Performed by: OPHTHALMOLOGY

## 2019-01-29 PROCEDURE — 99024 POSTOP FOLLOW-UP VISIT: CPT | Mod: POP,,, | Performed by: OPHTHALMOLOGY

## 2019-01-29 PROCEDURE — 99999 PR PBB SHADOW E&M-EST. PATIENT-LVL II: CPT | Mod: PBBFAC,,, | Performed by: OPHTHALMOLOGY

## 2019-01-29 NOTE — PROGRESS NOTES
HPI     1 day post op CE with IOL OD (Symfony) (1/14/2019).      Last edited by Elena Lezama MA on 1/29/2019  3:33 PM. (History)            Assessment /Plan     For exam results, see Encounter Report.    Post-operative state    Nuclear sclerosis of left eye      Slit lamp exam:  L/L: nl  K: clear, wound sealed  AC: 1+ cell  Lens: IOL centered and stable    POD1 s/p Phaco/IOL  Appropriate precautions and post op medications reviewed.  Patient instructed to call or come in if symptoms of redness, decreased vision, or pain are experienced.

## 2019-01-30 ENCOUNTER — TELEPHONE (OUTPATIENT)
Dept: OPHTHALMOLOGY | Facility: CLINIC | Age: 79
End: 2019-01-30

## 2019-01-30 NOTE — TELEPHONE ENCOUNTER
----- Message from Rosa Alfredo sent at 1/30/2019  3:18 PM CST -----  Contact: Oliver      ----- Message -----  From: Steven Desai  Sent: 1/30/2019   2:29 PM  To: [facility] Staff    Mr. Burk says that he needs some drops because he is almost out. Dr. Mendez wanted him to continue to use them. He can be reached at [phone]

## 2019-02-21 ENCOUNTER — TELEPHONE (OUTPATIENT)
Dept: DERMATOLOGY | Facility: CLINIC | Age: 79
End: 2019-02-21

## 2019-02-21 NOTE — TELEPHONE ENCOUNTER
EP with BCC on L cheek, will schedule mohs once photo and path are reviewed.     Pt is now schedule for mohs on 3/18 at 1130 am. Pt confirmed date, time and location.

## 2019-02-21 NOTE — TELEPHONE ENCOUNTER
----- Message from Hemalatha Stewart sent at 2/21/2019  9:04 AM CST -----  Contact: pt at [phone]  Needs Advice    Reason for call:        Communication Preference:    Additional Information:Calling to schedule surgery  Needs Advice    Reason for call:Dr. Booker referring pt top [facility].  Has seen her a couple of times in the past.  Pt is ready to schedule.        Communication Preference:call    Additional Information:

## 2019-03-18 ENCOUNTER — PROCEDURE VISIT (OUTPATIENT)
Dept: DERMATOLOGY | Facility: CLINIC | Age: 79
End: 2019-03-18
Payer: MEDICARE

## 2019-03-18 VITALS
SYSTOLIC BLOOD PRESSURE: 131 MMHG | BODY MASS INDEX: 25.73 KG/M2 | WEIGHT: 190 LBS | HEART RATE: 76 BPM | DIASTOLIC BLOOD PRESSURE: 66 MMHG | HEIGHT: 72 IN

## 2019-03-18 DIAGNOSIS — C44.319 BASAL CELL CARCINOMA OF LEFT CHEEK: Primary | ICD-10-CM

## 2019-03-18 PROCEDURE — 99499 NO LOS: ICD-10-PCS | Mod: S$PBB,,, | Performed by: DERMATOLOGY

## 2019-03-18 PROCEDURE — 13132 CMPLX RPR F/C/C/M/N/AX/G/H/F: CPT | Mod: 51,S$PBB,, | Performed by: DERMATOLOGY

## 2019-03-18 PROCEDURE — 99499 UNLISTED E&M SERVICE: CPT | Mod: S$PBB,,, | Performed by: DERMATOLOGY

## 2019-03-18 PROCEDURE — 17312: ICD-10-PCS | Mod: S$PBB,,, | Performed by: DERMATOLOGY

## 2019-03-18 PROCEDURE — 17311: ICD-10-PCS | Mod: S$PBB,,, | Performed by: DERMATOLOGY

## 2019-03-18 PROCEDURE — 13132 PR RECMPL WND HEAD,FAC,HAND 2.6-7.5 CM: ICD-10-PCS | Mod: 51,S$PBB,, | Performed by: DERMATOLOGY

## 2019-03-18 PROCEDURE — 17311 MOHS 1 STAGE H/N/HF/G: CPT | Mod: S$PBB,,, | Performed by: DERMATOLOGY

## 2019-03-18 PROCEDURE — 17311 MOHS 1 STAGE H/N/HF/G: CPT | Mod: PBBFAC | Performed by: DERMATOLOGY

## 2019-03-18 PROCEDURE — 17312 MOHS ADDL STAGE: CPT | Mod: S$PBB,,, | Performed by: DERMATOLOGY

## 2019-03-18 PROCEDURE — 17312 MOHS ADDL STAGE: CPT | Mod: PBBFAC | Performed by: DERMATOLOGY

## 2019-03-18 PROCEDURE — 13132 CMPLX RPR F/C/C/M/N/AX/G/H/F: CPT | Mod: PBBFAC | Performed by: DERMATOLOGY

## 2019-03-18 NOTE — PROGRESS NOTES
PROCEDURE: Mohs' Micrographic Surgery    INDICATION: Location in non-mask areas of face where maximum conservation of tumor-free tissue is needed. Biopsy-proven skin cancer of cosmetically and functionally important areas, including head, neck, genital, hand, foot, or areas known for having difficulty in healing, such as the lower anterior legs. Tumor with ill-defined borders. Tumor with aggressive histopathology. Aggressive histopathology including sclerosing, morpheaform/infiltrating, micronodular, superficial multicentric, poorly differentiated, basosquamous, or perineural invasion.    REFERRING MD: Luis Booker III, M.D.    CASE NUMBER:     ANESTHETIC: 5 cc 0.5% Lidocaine with Epi 1:200,000 mixed 1:1 with 0.5% Bupivacaine    SURGICAL PREP: Hibiclens    SURGEON: Bee Harper MD    ASSISTANTS: Cheryl Viera PA-C and Gamaliel Shah, Surg Tech    PREOPERATIVE DIAGNOSIS: basal cell carcinoma    POSTOPERATIVE DIAGNOSIS: basal cell carcinoma    PATHOLOGIC DIAGNOSIS: basal cell carcinoma- nodular, infiltrating, sclerosing    HISTOLOGY OF SPECIMENS IN FIRST STAGE:   Tumor Type: Tumor seen. Nodular basal cell carcinoma: Nodular tumor in dermis composed of basaloid cells exhibiting peripheral palisading and retraction artifact.  Infiltrative basal cell carcinoma: Basaloid tumor in dermis characterized by thin elongated strands of basaloid cells infiltrating between dermal collagen bundles.   Depth of Invasion: epidermis and dermis  Perineural Invasion: No    HISTOLOGY OF SPECIMENS IN SUBSEQUENT STAGES:  · Tumor Type: No tumor seen.    STAGES OF MOHS' SURGERY PERFORMED: 2    TUMOR-FREE PLANE ACHIEVED: Yes    HEMOSTASIS: electrocoagulation     SPECIMENS: 3 (2 in stage A and 1 in stage B)    LOCATION: left cheek. Patient verified location with hand held mirror.    INITIAL LESION SIZE: 0.5 x 0.7 cm    FINAL DEFECT SIZE: 1.0 x 1.5 cm    WOUND REPAIR/DISPOSITION: The patient tolerated Mohs' Micrographic Surgery for a  basal cell carcinoma very well. When the tumor was completely removed, a repair of the surgical defect was undertaken.      PROCEDURE: Complex Linear Repair    INDICATION: Status post Mohs' Micrographic Surgery for basal cell carcinoma.    CASE NUMBER:     SURGEON: Bee Harper MD    ASSISTANTS: Cheryl Viera PA-C and Courtney De Paz Surg Tech    ANESTHETIC: 4 cc 1% Lidocaine with Epinephrine 1:100,000    SURGICAL PREP: Hibiclens    LOCATION: left cheek    DEFECT SIZE: 1.0 x 1.5 cm    WOUND REPAIR/DISPOSITION:  After the patient's carcinoma had been completely removed with Mohs' Micrographic Surgery, a repair of the surgical defect was undertaken. The patient was returned to the operating suite where the area of left cheek was prepped, draped, and anesthetized in the usual sterile fashion. The wound was widely undermined in all directions. Then, electrocoagulation was used to obtain meticulous hemostasis. 5-0 Vicryl buried vertical mattress sutures were placed into the subcutaneous and dermal plane to close the wound and michael the cutaneous wound edge. Bilateral dog ears were identified and were removed by a standard Burow's triangle technique. The cutaneous wound edges were closed using running 5-0 Prolene suture.    The patient tolerated the procedure well.    The area was cleaned and dressed appropriately and the patient was given wound care instructions, as well as appointment for follow-up evaluation. Patient declined pain medication.    LENGTH OF REPAIR: 3 cm    Vitals:    03/18/19 1126 03/18/19 1323   BP: 138/69 131/66   BP Location: Right arm Left arm   Patient Position: Sitting Sitting   BP Method: Large (Automatic) Small (Automatic)   Pulse: 66 76   Weight: 86.2 kg (190 lb)    Height: 6' (1.829 m)

## 2019-03-25 ENCOUNTER — OFFICE VISIT (OUTPATIENT)
Dept: DERMATOLOGY | Facility: CLINIC | Age: 79
End: 2019-03-25
Payer: MEDICARE

## 2019-03-25 DIAGNOSIS — Z09 POSTOP CHECK: Primary | ICD-10-CM

## 2019-03-25 PROCEDURE — 99024 PR POST-OP FOLLOW-UP VISIT: ICD-10-PCS | Mod: POP,,, | Performed by: DERMATOLOGY

## 2019-03-25 PROCEDURE — 99999 PR PBB SHADOW E&M-EST. PATIENT-LVL II: CPT | Mod: PBBFAC,,, | Performed by: DERMATOLOGY

## 2019-03-25 PROCEDURE — 99999 PR PBB SHADOW E&M-EST. PATIENT-LVL II: ICD-10-PCS | Mod: PBBFAC,,, | Performed by: DERMATOLOGY

## 2019-03-25 PROCEDURE — 99212 OFFICE O/P EST SF 10 MIN: CPT | Mod: PBBFAC | Performed by: DERMATOLOGY

## 2019-03-25 PROCEDURE — 99024 POSTOP FOLLOW-UP VISIT: CPT | Mod: POP,,, | Performed by: DERMATOLOGY

## 2019-03-25 NOTE — PROGRESS NOTES
78 y.o. male patient is here for suture removal following Mohs' surgery.    Patient reports no problems with left cheek.    WOUND PE:  The left cheek sutures intact. Wound healing well. Good skin edges. No signs or symptoms of infection.    IMPRESSION:  Healing operative site from Mohs' surgery BCC, left cheek s/p Mohs with CLC, postop day # 7.    PLAN:  Sutures removed today. Steri-strips applied.  Continue wound care.  Keep moist with Aquaphor.    RTC:  In 3-6 months with Luis Booker III, M.D. for skin check or sooner if new concern arises.

## 2019-03-26 ENCOUNTER — OFFICE VISIT (OUTPATIENT)
Dept: OPTOMETRY | Facility: CLINIC | Age: 79
End: 2019-03-26
Payer: MEDICARE

## 2019-03-26 DIAGNOSIS — Z98.41 S/P BILATERAL CATARACT EXTRACTION: Primary | ICD-10-CM

## 2019-03-26 DIAGNOSIS — Z98.42 S/P BILATERAL CATARACT EXTRACTION: Primary | ICD-10-CM

## 2019-03-26 PROCEDURE — 99212 OFFICE O/P EST SF 10 MIN: CPT | Mod: PBBFAC | Performed by: OPTOMETRIST

## 2019-03-26 PROCEDURE — 99999 PR PBB SHADOW E&M-EST. PATIENT-LVL II: CPT | Mod: PBBFAC,,, | Performed by: OPTOMETRIST

## 2019-03-26 PROCEDURE — 99024 POSTOP FOLLOW-UP VISIT: CPT | Mod: POP,,, | Performed by: OPTOMETRIST

## 2019-03-26 PROCEDURE — 99999 PR PBB SHADOW E&M-EST. PATIENT-LVL II: ICD-10-PCS | Mod: PBBFAC,,, | Performed by: OPTOMETRIST

## 2019-03-26 PROCEDURE — 99024 PR POST-OP FOLLOW-UP VISIT: ICD-10-PCS | Mod: POP,,, | Performed by: OPTOMETRIST

## 2019-03-26 PROCEDURE — 92134 CPTRZ OPH DX IMG PST SGM RTA: CPT | Mod: PBBFAC | Performed by: OPTOMETRIST

## 2019-03-26 NOTE — PROGRESS NOTES
HPI     Post-op Evaluation      Additional comments: 2 month phaco OS              Comments     Last eye exam was 1/29/19 with Dr. Mendez.  Patient states vision is great since cataract sx's.  Patient denies diplopia, headaches, flashes/floaters, and pain.    01/14/2019 IMPLANT: [id number] 22.0 OD  01/28/2019 IMPLANT: [id number] 22.0 OS          Last edited by Maria Del Carmen Ibrahim on 3/26/2019  3:38 PM. (History)            Assessment /Plan     For exam results, see Encounter Report.    S/P bilateral cataract extraction  -     OCT- Retina          1.  Pt doing well.  No rx given.  No CME OU.   Retina flat and intact OU--no holes, tears, breaks, or RDs.  RTC 6 months for possible YAG.

## 2019-07-11 DIAGNOSIS — Z00.00 ROUTINE GENERAL MEDICAL EXAMINATION AT A HEALTH CARE FACILITY: Primary | ICD-10-CM

## 2019-08-06 ENCOUNTER — OFFICE VISIT (OUTPATIENT)
Dept: PULMONOLOGY | Facility: CLINIC | Age: 79
End: 2019-08-06
Payer: MEDICARE

## 2019-08-06 ENCOUNTER — HOSPITAL ENCOUNTER (OUTPATIENT)
Dept: CARDIOLOGY | Facility: CLINIC | Age: 79
Discharge: HOME | End: 2019-08-06
Payer: MEDICARE

## 2019-08-06 ENCOUNTER — CLINICAL SUPPORT (OUTPATIENT)
Dept: INTERNAL MEDICINE | Facility: CLINIC | Age: 79
End: 2019-08-06
Payer: MEDICARE

## 2019-08-06 VITALS
SYSTOLIC BLOOD PRESSURE: 144 MMHG | HEIGHT: 71 IN | WEIGHT: 190 LBS | HEART RATE: 66 BPM | DIASTOLIC BLOOD PRESSURE: 71 MMHG | BODY MASS INDEX: 26.6 KG/M2

## 2019-08-06 DIAGNOSIS — R73.09 OTHER ABNORMAL GLUCOSE: ICD-10-CM

## 2019-08-06 DIAGNOSIS — Z12.5 SPECIAL SCREENING FOR MALIGNANT NEOPLASM OF PROSTATE: ICD-10-CM

## 2019-08-06 DIAGNOSIS — R53.83 FATIGUE, UNSPECIFIED TYPE: ICD-10-CM

## 2019-08-06 DIAGNOSIS — R79.9 ABNORMAL BLOOD CHEMISTRY: ICD-10-CM

## 2019-08-06 DIAGNOSIS — R53.0 NEOPLASTIC MALIGNANT RELATED FATIGUE: ICD-10-CM

## 2019-08-06 DIAGNOSIS — Z00.00 ANNUAL PHYSICAL EXAM: Primary | ICD-10-CM

## 2019-08-06 DIAGNOSIS — E78.5 HYPERLIPIDEMIA, UNSPECIFIED HYPERLIPIDEMIA TYPE: Primary | ICD-10-CM

## 2019-08-06 DIAGNOSIS — Z00.00 ROUTINE GENERAL MEDICAL EXAMINATION AT A HEALTH CARE FACILITY: ICD-10-CM

## 2019-08-06 LAB
ALBUMIN SERPL BCP-MCNC: 3.9 G/DL (ref 3.5–5.2)
ALP SERPL-CCNC: 61 U/L (ref 55–135)
ALT SERPL W/O P-5'-P-CCNC: 14 U/L (ref 10–44)
ANION GAP SERPL CALC-SCNC: 9 MMOL/L (ref 8–16)
AST SERPL-CCNC: 15 U/L (ref 10–40)
BILIRUB SERPL-MCNC: 0.5 MG/DL (ref 0.1–1)
BUN SERPL-MCNC: 19 MG/DL (ref 8–23)
CALCIUM SERPL-MCNC: 9.9 MG/DL (ref 8.7–10.5)
CHLORIDE SERPL-SCNC: 102 MMOL/L (ref 95–110)
CHOLEST SERPL-MCNC: 135 MG/DL (ref 120–199)
CHOLEST/HDLC SERPL: 2.6 {RATIO} (ref 2–5)
CO2 SERPL-SCNC: 25 MMOL/L (ref 23–29)
COMPLEXED PSA SERPL-MCNC: <0.01 NG/ML (ref 0–4)
CREAT SERPL-MCNC: 0.8 MG/DL (ref 0.5–1.4)
ERYTHROCYTE [DISTWIDTH] IN BLOOD BY AUTOMATED COUNT: 11.9 % (ref 11.5–14.5)
EST. GFR  (AFRICAN AMERICAN): >60 ML/MIN/1.73 M^2
EST. GFR  (NON AFRICAN AMERICAN): >60 ML/MIN/1.73 M^2
ESTIMATED AVG GLUCOSE: 111 MG/DL (ref 68–131)
GLUCOSE SERPL-MCNC: 94 MG/DL (ref 70–110)
HBA1C MFR BLD HPLC: 5.5 % (ref 4–5.6)
HCT VFR BLD AUTO: 42.9 % (ref 40–54)
HDLC SERPL-MCNC: 52 MG/DL (ref 40–75)
HDLC SERPL: 38.5 % (ref 20–50)
HGB BLD-MCNC: 14.2 G/DL (ref 14–18)
LDLC SERPL CALC-MCNC: 71 MG/DL (ref 63–159)
MCH RBC QN AUTO: 32.6 PG (ref 27–31)
MCHC RBC AUTO-ENTMCNC: 33.1 G/DL (ref 32–36)
MCV RBC AUTO: 98 FL (ref 82–98)
NONHDLC SERPL-MCNC: 83 MG/DL
PLATELET # BLD AUTO: 225 K/UL (ref 150–350)
PMV BLD AUTO: 10.8 FL (ref 9.2–12.9)
POTASSIUM SERPL-SCNC: 4.8 MMOL/L (ref 3.5–5.1)
PROT SERPL-MCNC: 7 G/DL (ref 6–8.4)
RBC # BLD AUTO: 4.36 M/UL (ref 4.6–6.2)
SODIUM SERPL-SCNC: 136 MMOL/L (ref 136–145)
TRIGL SERPL-MCNC: 60 MG/DL (ref 30–150)
TSH SERPL DL<=0.005 MIU/L-ACNC: 1.23 UIU/ML (ref 0.4–4)
WBC # BLD AUTO: 6.52 K/UL (ref 3.9–12.7)

## 2019-08-06 PROCEDURE — 99387 PR PREVENTIVE VISIT,NEW,65 & OVER: ICD-10-PCS | Mod: S$PBB,,, | Performed by: INTERNAL MEDICINE

## 2019-08-06 PROCEDURE — 99387 INIT PM E/M NEW PAT 65+ YRS: CPT | Mod: S$PBB,,, | Performed by: INTERNAL MEDICINE

## 2019-08-06 PROCEDURE — 83036 HEMOGLOBIN GLYCOSYLATED A1C: CPT

## 2019-08-06 PROCEDURE — 93005 ELECTROCARDIOGRAM TRACING: CPT | Mod: PBBFAC | Performed by: INTERNAL MEDICINE

## 2019-08-06 PROCEDURE — 93010 ELECTROCARDIOGRAM REPORT: CPT | Mod: S$PBB,,, | Performed by: INTERNAL MEDICINE

## 2019-08-06 PROCEDURE — 99999 PR PBB SHADOW E&M-EST. PATIENT-LVL III: CPT | Mod: PBBFAC,,, | Performed by: INTERNAL MEDICINE

## 2019-08-06 PROCEDURE — 85027 COMPLETE CBC AUTOMATED: CPT

## 2019-08-06 PROCEDURE — 84153 ASSAY OF PSA TOTAL: CPT

## 2019-08-06 PROCEDURE — 80053 COMPREHEN METABOLIC PANEL: CPT

## 2019-08-06 PROCEDURE — 99999 PR PBB SHADOW E&M-EST. PATIENT-LVL III: ICD-10-PCS | Mod: PBBFAC,,, | Performed by: INTERNAL MEDICINE

## 2019-08-06 PROCEDURE — 84443 ASSAY THYROID STIM HORMONE: CPT

## 2019-08-06 PROCEDURE — 93010 EKG 12-LEAD: ICD-10-PCS | Mod: S$PBB,,, | Performed by: INTERNAL MEDICINE

## 2019-08-06 PROCEDURE — 80061 LIPID PANEL: CPT

## 2019-08-06 RX ORDER — MELOXICAM 15 MG/1
1 TABLET ORAL NIGHTLY
Refills: 3 | COMMUNITY
Start: 2019-04-29 | End: 2023-07-24

## 2019-08-06 NOTE — PROGRESS NOTES
Subjective:       Patient ID: Oliver Burk Jr. is a 79 y.o. male.    Chief Complaint: Annual Exam    HPI  80 yo prominent  comes for his periodic health exam. He feels well, has a weakness in his lower extremity but it does preclude him from exercising regularly and playing a good game of cough. No somatic complaints today.  Review of Systems   Constitutional: Negative.    HENT: Negative.    Eyes: Negative.    Respiratory: Negative.    Cardiovascular: Negative.    Gastrointestinal: Negative.    Genitourinary: Positive for difficulty urinating.        S/P radon implants for early stage  Prostate Cancer  Takes flomax   Musculoskeletal: Positive for back pain and gait problem.        Spinal stenosis   Skin: Negative.    Psychiatric/Behavioral: Negative.    All other systems reviewed and are negative.      Objective:      Physical Exam   Constitutional: He is oriented to person, place, and time. He appears well-developed and well-nourished.   HENT:   Head: Normocephalic and atraumatic.   Right Ear: External ear normal.   Left Ear: External ear normal.   Eyes: Pupils are equal, round, and reactive to light. Conjunctivae and EOM are normal.   Neck: Normal range of motion. Neck supple.   Cardiovascular: Normal rate, regular rhythm and normal heart sounds.   Pulmonary/Chest: Effort normal and breath sounds normal.   Peak flow 550 l/min   Abdominal: Soft. Bowel sounds are normal.   Musculoskeletal: Normal range of motion.   Weakness left leg.    Neurological: He is alert and oriented to person, place, and time. He has normal reflexes.   Skin: Skin is warm and dry.   Psychiatric: He has a normal mood and affect. His behavior is normal. Judgment and thought content normal.       Assessment:       1. Annual physical exam        Plan:           Labs: All parameters are normal. IMP: Healthy Male

## 2019-08-06 NOTE — LETTER
August 6, 2019    Oliver LYON Wm Rodríguez  [address] [address]             [facility] - Pulmonary Services  [address]  [facility] [address]  Phone: [phone] Dear Jonel,      Thank you for allowing me to serve you and perform your Executive Health exam on 8/6/2019. This letter will serve as a brief summary of the physical findings and laboratory/studies performed and recommendations at this time. All studies are normal.. Can't wait until Beauty.          If you have any questions or concerns, please don't hesitate to call.    Sincerely,        Richar Patterson MD

## 2019-10-07 ENCOUNTER — TELEPHONE (OUTPATIENT)
Dept: OPHTHALMOLOGY | Facility: CLINIC | Age: 79
End: 2019-10-07

## 2019-10-07 NOTE — TELEPHONE ENCOUNTER
----- Message from Lisa Aj sent at 10/7/2019  9:07 AM CDT -----  Contact: Oliver Patel calling to find out when he should follow up with Dr Mendez to check his eye after surgery.  He had cataract surgery in Jan.  He can be reached at [phone]

## 2019-10-30 ENCOUNTER — PROCEDURE VISIT (OUTPATIENT)
Dept: OPHTHALMOLOGY | Facility: CLINIC | Age: 79
End: 2019-10-30
Payer: MEDICARE

## 2019-10-30 VITALS — DIASTOLIC BLOOD PRESSURE: 65 MMHG | SYSTOLIC BLOOD PRESSURE: 126 MMHG

## 2019-10-30 DIAGNOSIS — H26.493 PCO (POSTERIOR CAPSULAR OPACIFICATION), BILATERAL: Primary | ICD-10-CM

## 2019-10-30 PROCEDURE — 92012 INTRM OPH EXAM EST PATIENT: CPT | Mod: S$PBB,,, | Performed by: OPHTHALMOLOGY

## 2019-10-30 PROCEDURE — 92012 PR EYE EXAM, EST PATIENT,INTERMED: ICD-10-PCS | Mod: S$PBB,,, | Performed by: OPHTHALMOLOGY

## 2019-10-30 NOTE — PROGRESS NOTES
HPI     Patient states vision is good since cataract sx's in both eyes in January 2019  Patient denies diplopia, headaches, flashes/floaters, and pain.  OD at times feels like he has water or tears in it and will have to blink   a few times to try and clear up the vision at distance. All changes   gradually intermittent     01/14/2019 IMPLANT: [id number] 22.0 OD (symfony)  01/28/2019 IMPLANT: [id number] 22.0 OS (symfony)    Last edited by Hal Coughlin on 10/30/2019  3:10 PM. (History)            Assessment /Plan     For exam results, see Encounter Report.    PCO (posterior capsular opacification), bilateral      Tr PCO Monitor for now...  Excellent Symfony result

## 2019-11-24 ENCOUNTER — TELEPHONE (OUTPATIENT)
Dept: PULMONOLOGY | Facility: CLINIC | Age: 79
End: 2019-11-24

## 2019-11-26 RX ORDER — SIMVASTATIN 20 MG/1
TABLET, FILM COATED ORAL
Qty: 30 TABLET | Refills: 12 | Status: SHIPPED | OUTPATIENT
Start: 2019-11-26 | End: 2020-11-14

## 2019-11-26 RX ORDER — LISINOPRIL 10 MG/1
TABLET ORAL
Qty: 30 TABLET | Refills: 12 | Status: SHIPPED | OUTPATIENT
Start: 2019-11-26 | End: 2020-11-14

## 2020-02-19 RX ORDER — TAMSULOSIN HYDROCHLORIDE 0.4 MG/1
CAPSULE ORAL
Qty: 30 CAPSULE | Refills: 12 | Status: SHIPPED | OUTPATIENT
Start: 2020-02-19 | End: 2020-11-14

## 2020-03-19 ENCOUNTER — TELEPHONE (OUTPATIENT)
Dept: PULMONOLOGY | Facility: CLINIC | Age: 80
End: 2020-03-19

## 2020-06-03 DIAGNOSIS — Z00.00 ROUTINE GENERAL MEDICAL EXAMINATION AT A HEALTH CARE FACILITY: Primary | ICD-10-CM

## 2020-06-16 ENCOUNTER — TELEPHONE (OUTPATIENT)
Dept: OPHTHALMOLOGY | Facility: CLINIC | Age: 80
End: 2020-06-16

## 2020-06-16 NOTE — TELEPHONE ENCOUNTER
----- Message from Eneida Obrien sent at 6/16/2020  8:45 AM CDT -----  Contact: Oliver Patel stated he's returning a call from the office. Pt contact number is [phone]. Please contact pt

## 2020-07-02 ENCOUNTER — OFFICE VISIT (OUTPATIENT)
Dept: PULMONOLOGY | Facility: CLINIC | Age: 80
End: 2020-07-02
Payer: MEDICARE

## 2020-07-02 ENCOUNTER — CLINICAL SUPPORT (OUTPATIENT)
Dept: INTERNAL MEDICINE | Facility: CLINIC | Age: 80
End: 2020-07-02
Payer: MEDICARE

## 2020-07-02 ENCOUNTER — HOSPITAL ENCOUNTER (OUTPATIENT)
Dept: CARDIOLOGY | Facility: CLINIC | Age: 80
Discharge: HOME | End: 2020-07-02
Payer: MEDICARE

## 2020-07-02 DIAGNOSIS — Z00.00 ANNUAL PHYSICAL EXAM: Primary | ICD-10-CM

## 2020-07-02 DIAGNOSIS — R73.09 IMPAIRED GLUCOSE TOLERANCE TEST: ICD-10-CM

## 2020-07-02 DIAGNOSIS — Z00.00 ROUTINE GENERAL MEDICAL EXAMINATION AT A HEALTH CARE FACILITY: ICD-10-CM

## 2020-07-02 DIAGNOSIS — Z00.00 ROUTINE GENERAL MEDICAL EXAMINATION AT A HEALTH CARE FACILITY: Primary | ICD-10-CM

## 2020-07-02 DIAGNOSIS — E78.5 HYPERLIPIDEMIA, UNSPECIFIED HYPERLIPIDEMIA TYPE: ICD-10-CM

## 2020-07-02 DIAGNOSIS — Z12.5 ENCOUNTER FOR SCREENING FOR MALIGNANT NEOPLASM OF PROSTATE: ICD-10-CM

## 2020-07-02 DIAGNOSIS — N42.9 DISORDER OF PROSTATE: ICD-10-CM

## 2020-07-02 DIAGNOSIS — Z12.5 SPECIAL SCREENING FOR MALIGNANT NEOPLASM OF PROSTATE: ICD-10-CM

## 2020-07-02 DIAGNOSIS — R53.0 NEOPLASTIC MALIGNANT RELATED FATIGUE: ICD-10-CM

## 2020-07-02 DIAGNOSIS — R53.83 FATIGUE, UNSPECIFIED TYPE: ICD-10-CM

## 2020-07-02 DIAGNOSIS — R79.9 ABNORMAL BLOOD CHEMISTRY: ICD-10-CM

## 2020-07-02 LAB
ALBUMIN SERPL BCP-MCNC: 3.9 G/DL (ref 3.5–5.2)
ALP SERPL-CCNC: 74 U/L (ref 55–135)
ALT SERPL W/O P-5'-P-CCNC: 12 U/L (ref 10–44)
ANION GAP SERPL CALC-SCNC: 6 MMOL/L (ref 8–16)
AST SERPL-CCNC: 15 U/L (ref 10–40)
BILIRUB SERPL-MCNC: 0.7 MG/DL (ref 0.1–1)
BUN SERPL-MCNC: 13 MG/DL (ref 8–23)
CALCIUM SERPL-MCNC: 10.7 MG/DL (ref 8.7–10.5)
CHLORIDE SERPL-SCNC: 103 MMOL/L (ref 95–110)
CHOLEST SERPL-MCNC: 141 MG/DL (ref 120–199)
CHOLEST/HDLC SERPL: 2.5 {RATIO} (ref 2–5)
CO2 SERPL-SCNC: 30 MMOL/L (ref 23–29)
COMPLEXED PSA SERPL-MCNC: <0.01 NG/ML (ref 0–4)
CREAT SERPL-MCNC: 1 MG/DL (ref 0.5–1.4)
ERYTHROCYTE [DISTWIDTH] IN BLOOD BY AUTOMATED COUNT: 11.9 % (ref 11.5–14.5)
EST. GFR  (AFRICAN AMERICAN): >60 ML/MIN/1.73 M^2
EST. GFR  (NON AFRICAN AMERICAN): >60 ML/MIN/1.73 M^2
ESTIMATED AVG GLUCOSE: 108 MG/DL (ref 68–131)
GLUCOSE SERPL-MCNC: 100 MG/DL (ref 70–110)
HBA1C MFR BLD HPLC: 5.4 % (ref 4–5.6)
HCT VFR BLD AUTO: 45.2 % (ref 40–54)
HDLC SERPL-MCNC: 57 MG/DL (ref 40–75)
HDLC SERPL: 40.4 % (ref 20–50)
HGB BLD-MCNC: 14.5 G/DL (ref 14–18)
LDLC SERPL CALC-MCNC: 71.8 MG/DL (ref 63–159)
MCH RBC QN AUTO: 32.4 PG (ref 27–31)
MCHC RBC AUTO-ENTMCNC: 32.1 G/DL (ref 32–36)
MCV RBC AUTO: 101 FL (ref 82–98)
NONHDLC SERPL-MCNC: 84 MG/DL
PLATELET # BLD AUTO: 231 K/UL (ref 150–350)
PMV BLD AUTO: 11.2 FL (ref 9.2–12.9)
POTASSIUM SERPL-SCNC: 4.5 MMOL/L (ref 3.5–5.1)
PROT SERPL-MCNC: 7.3 G/DL (ref 6–8.4)
RBC # BLD AUTO: 4.48 M/UL (ref 4.6–6.2)
SODIUM SERPL-SCNC: 139 MMOL/L (ref 136–145)
TRIGL SERPL-MCNC: 61 MG/DL (ref 30–150)
TSH SERPL DL<=0.005 MIU/L-ACNC: 1.46 UIU/ML (ref 0.4–4)
WBC # BLD AUTO: 7.01 K/UL (ref 3.9–12.7)

## 2020-07-02 PROCEDURE — 85027 COMPLETE CBC AUTOMATED: CPT

## 2020-07-02 PROCEDURE — 83036 HEMOGLOBIN GLYCOSYLATED A1C: CPT

## 2020-07-02 PROCEDURE — 99999 PR PBB SHADOW E&M-EST. PATIENT-LVL II: ICD-10-PCS | Mod: PBBFAC,,, | Performed by: INTERNAL MEDICINE

## 2020-07-02 PROCEDURE — 36415 COLL VENOUS BLD VENIPUNCTURE: CPT

## 2020-07-02 PROCEDURE — 80061 LIPID PANEL: CPT

## 2020-07-02 PROCEDURE — 80053 COMPREHEN METABOLIC PANEL: CPT

## 2020-07-02 PROCEDURE — 84153 ASSAY OF PSA TOTAL: CPT

## 2020-07-02 PROCEDURE — 93010 ELECTROCARDIOGRAM REPORT: CPT | Mod: S$PBB,,, | Performed by: INTERNAL MEDICINE

## 2020-07-02 PROCEDURE — 93010 EKG 12-LEAD: ICD-10-PCS | Mod: S$PBB,,, | Performed by: INTERNAL MEDICINE

## 2020-07-02 PROCEDURE — 99999 PR PBB SHADOW E&M-EST. PATIENT-LVL II: CPT | Mod: PBBFAC,,, | Performed by: INTERNAL MEDICINE

## 2020-07-02 PROCEDURE — 99387 PR PREVENTIVE VISIT,NEW,65 & OVER: ICD-10-PCS | Mod: S$PBB,,, | Performed by: INTERNAL MEDICINE

## 2020-07-02 PROCEDURE — 93005 ELECTROCARDIOGRAM TRACING: CPT | Mod: PBBFAC | Performed by: INTERNAL MEDICINE

## 2020-07-02 PROCEDURE — 99387 INIT PM E/M NEW PAT 65+ YRS: CPT | Mod: S$PBB,,, | Performed by: INTERNAL MEDICINE

## 2020-07-02 PROCEDURE — 99212 OFFICE O/P EST SF 10 MIN: CPT | Mod: PBBFAC,25 | Performed by: INTERNAL MEDICINE

## 2020-07-02 PROCEDURE — 84443 ASSAY THYROID STIM HORMONE: CPT

## 2020-07-02 NOTE — PROGRESS NOTES
Subjective:       Patient ID: Oliver Burk Jr. is a 79 y.o. male.    Chief Complaint: Annual Exam    HPI  78 yo  at [employer] [place] and long term friend  Comes for his periodic health exam. 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 He has no active  Complaints  Today. He has a limp and  Atrophy of his right calf from spinal stenosis. It limits his walking distance but not his driving distance of a golf ball.  Takes flomax, lisinopiril and crestor.  Plans to go to [place] for the last two weeks of August.   Review of Systems   Constitutional: Negative.    HENT: Negative.    Eyes: Negative.    Respiratory: Negative.    Cardiovascular: Negative.    Gastrointestinal: Negative.    Genitourinary: Negative.         S/P Radon seeds for early prostate  Cancer. PSA:<0.01   Musculoskeletal: Negative.    Integumentary:  Negative.   Neurological: Negative.         Weakness in the lower extremity from spinal stenosis.   Psychiatric/Behavioral: Negative.    All other systems reviewed and are negative.        Objective:      Physical Exam  Constitutional:       Appearance: He is well-developed.   HENT:      Head: Normocephalic and atraumatic.      Right Ear: External ear normal.      Left Ear: External ear normal.   Eyes:      Conjunctiva/sclera: Conjunctivae normal.      Pupils: Pupils are equal, round, and reactive to light.   Neck:      Musculoskeletal: Normal range of motion and neck supple.   Cardiovascular:      Rate and Rhythm: Normal rate and regular rhythm.      Heart sounds: Normal heart sounds.   Pulmonary:      Effort: Pulmonary effort is normal.      Breath sounds: Normal breath sounds.   Abdominal:      General: Bowel sounds are normal.      Palpations: Abdomen is soft.   Musculoskeletal: Normal range of motion.      Comments: Weakness lower  extremities   Some atrophy of the right calf.   Skin:     General: Skin is warm and dry.   Neurological:       Mental Status: He is alert and oriented to person, place, and time.      Deep Tendon Reflexes: Reflexes are normal and symmetric.   Psychiatric:         Behavior: Behavior normal.         Thought Content: Thought content normal.         Judgment: Judgment normal.         Assessment:       1. Annual physical exam        Plan:         Labs:All parameters are normal, virtually unchanged from 8/6/19    EKG: Normal    IMP: Healthy Male with good health habits.

## 2020-07-02 NOTE — LETTER
July 2, 2020    Oliver LYON Wm Rodríguez  [address] [address]             [facility]  [address]  [facility] [address]  Phone: [phone] Dear Jonel,      Thank you for allowing me to serve you and perform your Executive Health exam on 7/2/2020. This letter will serve as a brief summary of the physical findings and laboratory/studies performed and recommendations at this time. Today's assessment is normal and essentially unchanged from your assessment of 2019. You are like a fine red wine---get better with age.         If you have any questions or concerns, please don't hesitate to call.    Sincerely your friend and doctor,        Richar Patterson MD

## 2020-12-14 ENCOUNTER — CLINICAL SUPPORT (OUTPATIENT)
Dept: URGENT CARE | Facility: CLINIC | Age: 80
End: 2020-12-14
Payer: MEDICARE

## 2020-12-14 DIAGNOSIS — Z11.9 SCREENING EXAMINATION FOR UNSPECIFIED INFECTIOUS DISEASE: Primary | ICD-10-CM

## 2020-12-14 LAB
CTP QC/QA: YES
SARS-COV-2 RDRP RESP QL NAA+PROBE: NEGATIVE

## 2020-12-14 PROCEDURE — U0002 COVID-19 LAB TEST NON-CDC: HCPCS | Mod: QW,S$GLB,, | Performed by: FAMILY MEDICINE

## 2020-12-14 PROCEDURE — 99211 OFF/OP EST MAY X REQ PHY/QHP: CPT | Mod: S$GLB,,, | Performed by: FAMILY MEDICINE

## 2020-12-14 PROCEDURE — U0002: ICD-10-PCS | Mod: QW,S$GLB,, | Performed by: FAMILY MEDICINE

## 2020-12-14 PROCEDURE — 99211 PR OFFICE/OUTPT VISIT, EST, LEVL I: ICD-10-PCS | Mod: S$GLB,,, | Performed by: FAMILY MEDICINE

## 2021-01-09 ENCOUNTER — IMMUNIZATION (OUTPATIENT)
Dept: INTERNAL MEDICINE | Facility: CLINIC | Age: 81
End: 2021-01-09
Payer: MEDICARE

## 2021-01-09 DIAGNOSIS — Z23 NEED FOR VACCINATION: ICD-10-CM

## 2021-01-09 PROCEDURE — 91300 COVID-19, MRNA, LNP-S, PF, 30 MCG/0.3 ML DOSE VACCINE: CPT | Mod: PBBFAC

## 2021-01-30 ENCOUNTER — IMMUNIZATION (OUTPATIENT)
Dept: INTERNAL MEDICINE | Facility: CLINIC | Age: 81
End: 2021-01-30
Payer: MEDICARE

## 2021-01-30 DIAGNOSIS — Z23 NEED FOR VACCINATION: Primary | ICD-10-CM

## 2021-01-30 PROCEDURE — 91300 COVID-19, MRNA, LNP-S, PF, 30 MCG/0.3 ML DOSE VACCINE: CPT | Mod: PBBFAC | Performed by: INTERNAL MEDICINE

## 2021-01-30 PROCEDURE — 0002A COVID-19, MRNA, LNP-S, PF, 30 MCG/0.3 ML DOSE VACCINE: CPT | Mod: PBBFAC | Performed by: INTERNAL MEDICINE

## 2021-03-02 ENCOUNTER — TELEPHONE (OUTPATIENT)
Dept: PULMONOLOGY | Facility: CLINIC | Age: 81
End: 2021-03-02

## 2021-03-18 ENCOUNTER — PATIENT MESSAGE (OUTPATIENT)
Dept: RESEARCH | Facility: HOSPITAL | Age: 81
End: 2021-03-18

## 2021-04-08 ENCOUNTER — CLINICAL SUPPORT (OUTPATIENT)
Dept: INTERNAL MEDICINE | Facility: CLINIC | Age: 81
End: 2021-04-08
Payer: MEDICARE

## 2021-04-08 ENCOUNTER — TELEPHONE (OUTPATIENT)
Dept: PULMONOLOGY | Facility: CLINIC | Age: 81
End: 2021-04-08

## 2021-04-08 DIAGNOSIS — Z00.00 ROUTINE GENERAL MEDICAL EXAMINATION AT A HEALTH CARE FACILITY: Primary | ICD-10-CM

## 2021-04-08 DIAGNOSIS — E78.5 HYPERLIPIDEMIA, UNSPECIFIED HYPERLIPIDEMIA TYPE: ICD-10-CM

## 2021-04-08 DIAGNOSIS — R53.83 FATIGUE, UNSPECIFIED TYPE: ICD-10-CM

## 2021-04-08 DIAGNOSIS — R53.0 NEOPLASTIC MALIGNANT RELATED FATIGUE: ICD-10-CM

## 2021-04-08 DIAGNOSIS — R79.9 ABNORMAL BLOOD CHEMISTRY: ICD-10-CM

## 2021-04-08 LAB
ALBUMIN SERPL BCP-MCNC: 3.8 G/DL (ref 3.5–5.2)
ALP SERPL-CCNC: 67 U/L (ref 55–135)
ALT SERPL W/O P-5'-P-CCNC: 17 U/L (ref 10–44)
ANION GAP SERPL CALC-SCNC: 9 MMOL/L (ref 8–16)
AST SERPL-CCNC: 18 U/L (ref 10–40)
BILIRUB SERPL-MCNC: 0.6 MG/DL (ref 0.1–1)
BUN SERPL-MCNC: 11 MG/DL (ref 8–23)
CALCIUM SERPL-MCNC: 9.9 MG/DL (ref 8.7–10.5)
CHLORIDE SERPL-SCNC: 106 MMOL/L (ref 95–110)
CHOLEST SERPL-MCNC: 124 MG/DL (ref 120–199)
CHOLEST/HDLC SERPL: 2.4 {RATIO} (ref 2–5)
CO2 SERPL-SCNC: 28 MMOL/L (ref 23–29)
CREAT SERPL-MCNC: 0.8 MG/DL (ref 0.5–1.4)
ERYTHROCYTE [DISTWIDTH] IN BLOOD BY AUTOMATED COUNT: 11.9 % (ref 11.5–14.5)
ERYTHROCYTE [SEDIMENTATION RATE] IN BLOOD BY WESTERGREN METHOD: 21 MM/HR (ref 0–23)
EST. GFR  (AFRICAN AMERICAN): >60 ML/MIN/1.73 M^2
EST. GFR  (NON AFRICAN AMERICAN): >60 ML/MIN/1.73 M^2
GLUCOSE SERPL-MCNC: 109 MG/DL (ref 70–110)
HCT VFR BLD AUTO: 46.2 % (ref 40–54)
HDLC SERPL-MCNC: 52 MG/DL (ref 40–75)
HDLC SERPL: 41.9 % (ref 20–50)
HGB BLD-MCNC: 14.8 G/DL (ref 14–18)
LDLC SERPL CALC-MCNC: 60.2 MG/DL (ref 63–159)
MCH RBC QN AUTO: 32.7 PG (ref 27–31)
MCHC RBC AUTO-ENTMCNC: 32 G/DL (ref 32–36)
MCV RBC AUTO: 102 FL (ref 82–98)
NONHDLC SERPL-MCNC: 72 MG/DL
PLATELET # BLD AUTO: 242 K/UL (ref 150–450)
PMV BLD AUTO: 10.7 FL (ref 9.2–12.9)
POTASSIUM SERPL-SCNC: 4.6 MMOL/L (ref 3.5–5.1)
PROT SERPL-MCNC: 7.1 G/DL (ref 6–8.4)
RBC # BLD AUTO: 4.52 M/UL (ref 4.6–6.2)
SODIUM SERPL-SCNC: 143 MMOL/L (ref 136–145)
TRIGL SERPL-MCNC: 59 MG/DL (ref 30–150)
TSH SERPL DL<=0.005 MIU/L-ACNC: 1.19 UIU/ML (ref 0.4–4)
WBC # BLD AUTO: 5.98 K/UL (ref 3.9–12.7)

## 2021-04-08 PROCEDURE — 85027 COMPLETE CBC AUTOMATED: CPT | Performed by: INTERNAL MEDICINE

## 2021-04-08 PROCEDURE — 80061 LIPID PANEL: CPT | Performed by: INTERNAL MEDICINE

## 2021-04-08 PROCEDURE — 84443 ASSAY THYROID STIM HORMONE: CPT | Performed by: INTERNAL MEDICINE

## 2021-04-08 PROCEDURE — 80053 COMPREHEN METABOLIC PANEL: CPT | Performed by: INTERNAL MEDICINE

## 2021-04-08 PROCEDURE — 36415 COLL VENOUS BLD VENIPUNCTURE: CPT | Performed by: INTERNAL MEDICINE

## 2021-04-08 PROCEDURE — 85652 RBC SED RATE AUTOMATED: CPT | Performed by: INTERNAL MEDICINE

## 2021-06-22 ENCOUNTER — OFFICE VISIT (OUTPATIENT)
Dept: PULMONOLOGY | Facility: CLINIC | Age: 81
End: 2021-06-22
Payer: MEDICARE

## 2021-06-22 ENCOUNTER — CLINICAL SUPPORT (OUTPATIENT)
Dept: INTERNAL MEDICINE | Facility: CLINIC | Age: 81
End: 2021-06-22
Payer: MEDICARE

## 2021-06-22 VITALS
OXYGEN SATURATION: 96 % | SYSTOLIC BLOOD PRESSURE: 128 MMHG | DIASTOLIC BLOOD PRESSURE: 76 MMHG | WEIGHT: 189.38 LBS | HEART RATE: 74 BPM | BODY MASS INDEX: 26.41 KG/M2

## 2021-06-22 DIAGNOSIS — R53.83 FATIGUE, UNSPECIFIED TYPE: Primary | ICD-10-CM

## 2021-06-22 DIAGNOSIS — R79.9 ABNORMAL BLOOD CHEMISTRY: ICD-10-CM

## 2021-06-22 LAB
ALBUMIN SERPL BCP-MCNC: 3.7 G/DL (ref 3.5–5.2)
ALP SERPL-CCNC: 69 U/L (ref 55–135)
ALT SERPL W/O P-5'-P-CCNC: 12 U/L (ref 10–44)
ANION GAP SERPL CALC-SCNC: 10 MMOL/L (ref 8–16)
AST SERPL-CCNC: 13 U/L (ref 10–40)
BILIRUB SERPL-MCNC: 0.6 MG/DL (ref 0.1–1)
BUN SERPL-MCNC: 11 MG/DL (ref 8–23)
CALCIUM SERPL-MCNC: 10.1 MG/DL (ref 8.7–10.5)
CHLORIDE SERPL-SCNC: 103 MMOL/L (ref 95–110)
CO2 SERPL-SCNC: 26 MMOL/L (ref 23–29)
CREAT SERPL-MCNC: 0.8 MG/DL (ref 0.5–1.4)
ERYTHROCYTE [DISTWIDTH] IN BLOOD BY AUTOMATED COUNT: 11.7 % (ref 11.5–14.5)
ERYTHROCYTE [SEDIMENTATION RATE] IN BLOOD BY WESTERGREN METHOD: 41 MM/HR (ref 0–23)
EST. GFR  (AFRICAN AMERICAN): >60 ML/MIN/1.73 M^2
EST. GFR  (NON AFRICAN AMERICAN): >60 ML/MIN/1.73 M^2
GLUCOSE SERPL-MCNC: 85 MG/DL (ref 70–110)
HCT VFR BLD AUTO: 44.2 % (ref 40–54)
HGB BLD-MCNC: 14.6 G/DL (ref 14–18)
MCH RBC QN AUTO: 32.9 PG (ref 27–31)
MCHC RBC AUTO-ENTMCNC: 33 G/DL (ref 32–36)
MCV RBC AUTO: 100 FL (ref 82–98)
PLATELET # BLD AUTO: 277 K/UL (ref 150–450)
PMV BLD AUTO: 10.3 FL (ref 9.2–12.9)
POTASSIUM SERPL-SCNC: 4.4 MMOL/L (ref 3.5–5.1)
PROT SERPL-MCNC: 7.1 G/DL (ref 6–8.4)
RBC # BLD AUTO: 4.44 M/UL (ref 4.6–6.2)
SODIUM SERPL-SCNC: 139 MMOL/L (ref 136–145)
WBC # BLD AUTO: 6.54 K/UL (ref 3.9–12.7)

## 2021-06-22 PROCEDURE — 99212 OFFICE O/P EST SF 10 MIN: CPT | Mod: PBBFAC | Performed by: INTERNAL MEDICINE

## 2021-06-22 PROCEDURE — 85652 RBC SED RATE AUTOMATED: CPT | Performed by: INTERNAL MEDICINE

## 2021-06-22 PROCEDURE — 36415 COLL VENOUS BLD VENIPUNCTURE: CPT | Performed by: INTERNAL MEDICINE

## 2021-06-22 PROCEDURE — 99214 PR OFFICE/OUTPT VISIT, EST, LEVL IV, 30-39 MIN: ICD-10-PCS | Mod: S$PBB,,, | Performed by: INTERNAL MEDICINE

## 2021-06-22 PROCEDURE — 99999 PR PBB SHADOW E&M-EST. PATIENT-LVL II: CPT | Mod: PBBFAC,,, | Performed by: INTERNAL MEDICINE

## 2021-06-22 PROCEDURE — 99999 PR PBB SHADOW E&M-EST. PATIENT-LVL II: ICD-10-PCS | Mod: PBBFAC,,, | Performed by: INTERNAL MEDICINE

## 2021-06-22 PROCEDURE — 85027 COMPLETE CBC AUTOMATED: CPT | Performed by: INTERNAL MEDICINE

## 2021-06-22 PROCEDURE — 99214 OFFICE O/P EST MOD 30 MIN: CPT | Mod: S$PBB,,, | Performed by: INTERNAL MEDICINE

## 2021-06-22 PROCEDURE — 80053 COMPREHEN METABOLIC PANEL: CPT | Performed by: INTERNAL MEDICINE

## 2021-06-22 RX ORDER — GABAPENTIN 300 MG/1
300 CAPSULE ORAL NIGHTLY
COMMUNITY
Start: 2021-06-15 | End: 2023-04-06

## 2021-06-22 RX ORDER — DICLOFENAC SODIUM 10 MG/G
1 GEL TOPICAL 2 TIMES DAILY PRN
COMMUNITY
Start: 2021-03-02

## 2022-01-06 ENCOUNTER — TELEPHONE (OUTPATIENT)
Dept: PULMONOLOGY | Facility: CLINIC | Age: 82
End: 2022-01-06
Payer: MEDICARE

## 2022-01-06 RX ORDER — GABAPENTIN 300 MG/1
300 CAPSULE ORAL 3 TIMES DAILY
Qty: 90 CAPSULE | Refills: 11 | Status: SHIPPED | OUTPATIENT
Start: 2022-01-06 | End: 2023-01-06

## 2022-01-06 RX ORDER — LISINOPRIL 10 MG/1
10 TABLET ORAL DAILY
Qty: 90 TABLET | Refills: 3 | Status: SHIPPED | OUTPATIENT
Start: 2022-01-06 | End: 2022-10-07

## 2022-07-19 ENCOUNTER — TELEPHONE (OUTPATIENT)
Dept: INFECTIOUS DISEASES | Facility: CLINIC | Age: 82
End: 2022-07-19
Payer: MEDICARE

## 2022-07-19 NOTE — TELEPHONE ENCOUNTER
----- Message from Valerie Zimmerman LPN sent at 7/19/2022  1:31 PM CDT -----  Regarding: FW: vaccine card  Contact: pt    ----- Message -----  From: Kayce Escamilla  Sent: 7/19/2022  12:24 PM CDT  To: , #  Subject: vaccine card                                     Pt calling to get a new card of all his vaccines , please call       Confirmed patient's contact info below:  Contact Name: Oliver Wm  Phone Number: [phone]

## 2022-07-19 NOTE — TELEPHONE ENCOUNTER
I think patient talking about covid card.  If so we do not have any covid cards he would have to go back to where he received the covid vaccine it looks like [facility] pharmacy or call them to see if they could give him a card.

## 2022-07-27 ENCOUNTER — IMMUNIZATION (OUTPATIENT)
Dept: INTERNAL MEDICINE | Facility: CLINIC | Age: 82
End: 2022-07-27
Payer: MEDICARE

## 2022-07-27 DIAGNOSIS — Z23 NEED FOR VACCINATION: Primary | ICD-10-CM

## 2022-07-27 PROCEDURE — 0054A COVID-19, MRNA, LNP-S, PF, 30 MCG/0.3 ML DOSE VACCINE (PFIZER): CPT | Mod: PBBFAC

## 2022-09-20 DIAGNOSIS — Z00.00 ROUTINE GENERAL MEDICAL EXAMINATION AT A HEALTH CARE FACILITY: Primary | ICD-10-CM

## 2022-09-21 DIAGNOSIS — Z00.00 ROUTINE GENERAL MEDICAL EXAMINATION AT A HEALTH CARE FACILITY: Primary | ICD-10-CM

## 2022-10-06 ENCOUNTER — HOSPITAL ENCOUNTER (OUTPATIENT)
Dept: CARDIOLOGY | Facility: CLINIC | Age: 82
Discharge: HOME | End: 2022-10-06
Payer: MEDICARE

## 2022-10-06 ENCOUNTER — OFFICE VISIT (OUTPATIENT)
Dept: PULMONOLOGY | Facility: CLINIC | Age: 82
End: 2022-10-06
Payer: MEDICARE

## 2022-10-06 ENCOUNTER — CLINICAL SUPPORT (OUTPATIENT)
Dept: INTERNAL MEDICINE | Facility: CLINIC | Age: 82
End: 2022-10-06
Payer: MEDICARE

## 2022-10-06 VITALS
HEART RATE: 106 BPM | SYSTOLIC BLOOD PRESSURE: 113 MMHG | WEIGHT: 185 LBS | BODY MASS INDEX: 25.06 KG/M2 | OXYGEN SATURATION: 96 % | HEIGHT: 72 IN | DIASTOLIC BLOOD PRESSURE: 80 MMHG

## 2022-10-06 DIAGNOSIS — Z12.5 SPECIAL SCREENING FOR MALIGNANT NEOPLASM OF PROSTATE: ICD-10-CM

## 2022-10-06 DIAGNOSIS — R73.09 IMPAIRED GLUCOSE TOLERANCE TEST: ICD-10-CM

## 2022-10-06 DIAGNOSIS — R79.9 ABNORMAL BLOOD CHEMISTRY: ICD-10-CM

## 2022-10-06 DIAGNOSIS — I48.91 ATRIAL FIBRILLATION BY ELECTROCARDIOGRAM: Primary | ICD-10-CM

## 2022-10-06 DIAGNOSIS — R53.0 NEOPLASTIC MALIGNANT RELATED FATIGUE: ICD-10-CM

## 2022-10-06 DIAGNOSIS — R53.83 FATIGUE, UNSPECIFIED TYPE: ICD-10-CM

## 2022-10-06 DIAGNOSIS — Z00.00 ANNUAL PHYSICAL EXAM: Primary | ICD-10-CM

## 2022-10-06 DIAGNOSIS — Z00.00 ROUTINE GENERAL MEDICAL EXAMINATION AT A HEALTH CARE FACILITY: ICD-10-CM

## 2022-10-06 DIAGNOSIS — E78.5 HYPERLIPIDEMIA, UNSPECIFIED HYPERLIPIDEMIA TYPE: Primary | ICD-10-CM

## 2022-10-06 LAB
ALBUMIN SERPL BCP-MCNC: 3.9 G/DL (ref 3.5–5.2)
ALP SERPL-CCNC: 57 U/L (ref 55–135)
ALT SERPL W/O P-5'-P-CCNC: 16 U/L (ref 10–44)
ANION GAP SERPL CALC-SCNC: 10 MMOL/L (ref 8–16)
AST SERPL-CCNC: 15 U/L (ref 10–40)
BILIRUB SERPL-MCNC: 1.1 MG/DL (ref 0.1–1)
BUN SERPL-MCNC: 12 MG/DL (ref 8–23)
CALCIUM SERPL-MCNC: 10.1 MG/DL (ref 8.7–10.5)
CHLORIDE SERPL-SCNC: 106 MMOL/L (ref 95–110)
CHOLEST SERPL-MCNC: 112 MG/DL (ref 120–199)
CHOLEST/HDLC SERPL: 2.5 {RATIO} (ref 2–5)
CO2 SERPL-SCNC: 24 MMOL/L (ref 23–29)
COMPLEXED PSA SERPL-MCNC: <0.01 NG/ML (ref 0–4)
CREAT SERPL-MCNC: 0.9 MG/DL (ref 0.5–1.4)
ERYTHROCYTE [DISTWIDTH] IN BLOOD BY AUTOMATED COUNT: 11.9 % (ref 11.5–14.5)
EST. GFR  (NO RACE VARIABLE): >60 ML/MIN/1.73 M^2
ESTIMATED AVG GLUCOSE: 111 MG/DL (ref 68–131)
GLUCOSE SERPL-MCNC: 91 MG/DL (ref 70–110)
HBA1C MFR BLD: 5.5 % (ref 4–5.6)
HCT VFR BLD AUTO: 43.7 % (ref 40–54)
HDLC SERPL-MCNC: 44 MG/DL (ref 40–75)
HDLC SERPL: 39.3 % (ref 20–50)
HGB BLD-MCNC: 14.2 G/DL (ref 14–18)
LDLC SERPL CALC-MCNC: 57.2 MG/DL (ref 63–159)
MCH RBC QN AUTO: 32.9 PG (ref 27–31)
MCHC RBC AUTO-ENTMCNC: 32.5 G/DL (ref 32–36)
MCV RBC AUTO: 101 FL (ref 82–98)
NONHDLC SERPL-MCNC: 68 MG/DL
PLATELET # BLD AUTO: 207 K/UL (ref 150–450)
PMV BLD AUTO: 11 FL (ref 9.2–12.9)
POTASSIUM SERPL-SCNC: 4.6 MMOL/L (ref 3.5–5.1)
PROT SERPL-MCNC: 6.8 G/DL (ref 6–8.4)
RBC # BLD AUTO: 4.31 M/UL (ref 4.6–6.2)
SODIUM SERPL-SCNC: 140 MMOL/L (ref 136–145)
TRIGL SERPL-MCNC: 54 MG/DL (ref 30–150)
TSH SERPL DL<=0.005 MIU/L-ACNC: 1.31 UIU/ML (ref 0.4–4)
WBC # BLD AUTO: 5.68 K/UL (ref 3.9–12.7)

## 2022-10-06 PROCEDURE — 99999 PR PBB SHADOW E&M-EST. PATIENT-LVL III: ICD-10-PCS | Mod: PBBFAC,,, | Performed by: INTERNAL MEDICINE

## 2022-10-06 PROCEDURE — 83036 HEMOGLOBIN GLYCOSYLATED A1C: CPT | Performed by: INTERNAL MEDICINE

## 2022-10-06 PROCEDURE — 93005 ELECTROCARDIOGRAM TRACING: CPT | Mod: PBBFAC | Performed by: INTERNAL MEDICINE

## 2022-10-06 PROCEDURE — 80061 LIPID PANEL: CPT | Performed by: INTERNAL MEDICINE

## 2022-10-06 PROCEDURE — 99213 OFFICE O/P EST LOW 20 MIN: CPT | Mod: PBBFAC | Performed by: INTERNAL MEDICINE

## 2022-10-06 PROCEDURE — 84443 ASSAY THYROID STIM HORMONE: CPT | Performed by: INTERNAL MEDICINE

## 2022-10-06 PROCEDURE — 84153 ASSAY OF PSA TOTAL: CPT | Performed by: INTERNAL MEDICINE

## 2022-10-06 PROCEDURE — 93010 EKG 12-LEAD: ICD-10-PCS | Mod: S$PBB,,, | Performed by: INTERNAL MEDICINE

## 2022-10-06 PROCEDURE — 99213 OFFICE O/P EST LOW 20 MIN: CPT | Mod: S$PBB,ICN,, | Performed by: INTERNAL MEDICINE

## 2022-10-06 PROCEDURE — 80053 COMPREHEN METABOLIC PANEL: CPT | Performed by: INTERNAL MEDICINE

## 2022-10-06 PROCEDURE — 99213 PR OFFICE/OUTPT VISIT, EST, LEVL III, 20-29 MIN: ICD-10-PCS | Mod: S$PBB,ICN,, | Performed by: INTERNAL MEDICINE

## 2022-10-06 PROCEDURE — 99999 PR PBB SHADOW E&M-EST. PATIENT-LVL III: CPT | Mod: PBBFAC,,, | Performed by: INTERNAL MEDICINE

## 2022-10-06 PROCEDURE — 85027 COMPLETE CBC AUTOMATED: CPT | Performed by: INTERNAL MEDICINE

## 2022-10-06 PROCEDURE — 93010 ELECTROCARDIOGRAM REPORT: CPT | Mod: S$PBB,,, | Performed by: INTERNAL MEDICINE

## 2022-10-06 NOTE — PROGRESS NOTES
Subjective:       Patient ID: Oliver Burk Jr. is a 82 y.o. male.    Chief Complaint: No chief complaint on file.    HPI  81 yo  and long time friend comes for his periodic health exam. He recently returned from a trip to [facility]. He feels well, he & I celebrated a friend's 82th birthday, night before last. When [facility], Jonathan noted an irregular heart beat. He had NO awareness of the irregularity. Worked out at the [employer] earlier today.   Has no symptoms.  Has bilateral foot drop secondary to spinal stenosis.  Review of Systems   Constitutional: Negative.    HENT: Negative.     Eyes: Negative.    Respiratory: Negative.     Cardiovascular: Negative.         Newly discovered atrial fibrillation at today's visit.   Gastrointestinal: Negative.    Genitourinary: Negative.         Prostate cancer treated in the past with radon seeds.    Musculoskeletal: Negative.    Integumentary:  Negative.   Neurological: Negative.         Bilateral foot drop   Psychiatric/Behavioral: Negative.     All other systems reviewed and are negative.      Objective:      Physical Exam  Constitutional:       Appearance: He is well-developed.   HENT:      Head: Normocephalic and atraumatic.      Right Ear: External ear normal.      Left Ear: External ear normal.   Eyes:      Conjunctiva/sclera: Conjunctivae normal.      Pupils: Pupils are equal, round, and reactive to light.   Cardiovascular:      Rate and Rhythm: Normal rate and regular rhythm.      Heart sounds: Normal heart sounds.      Comments: New onset atrial fibrillation  Pulmonary:      Effort: Pulmonary effort is normal.      Breath sounds: Normal breath sounds.      Comments: Peak flow 550 l/min  Abdominal:      General: Bowel sounds are normal.      Palpations: Abdomen is soft.   Musculoskeletal:         General: Normal range of motion.      Cervical back: Normal range of motion and neck supple.   Skin:     General: Skin is  warm and dry.   Neurological:      Mental Status: He is alert and oriented to person, place, and time.      Deep Tendon Reflexes: Reflexes are normal and symmetric.   Psychiatric:         Behavior: Behavior normal.         Thought Content: Thought content normal.         Judgment: Judgment normal.       Assessment:       Problem List Items Addressed This Visit    None      Plan:           Labs  All parameters are normal. Cholesterol and LDL are very low. PSA consistent with his treatment for prostate cancer  EKG: Atrial fibrillation with rapid ventricular response  Referring  to EP   Has an keely with Dr Barriga tomorrow at 8 AM  Told  to take a full strength ASA when he get home. Did not start eliquis since  I told know what they will need to do tomorrow.      He  has no clinical awareness of his atrial fibrillation.

## 2022-10-06 NOTE — LETTER
October 6, 2022    Oliver Burk Jr.  [address] [facility] [address]             [facility] [address] [address]  [address]  [facility] [address]  Phone: [phone] Dear Jonel,        Thank you for allowing me to serve you and perform your [facility] exam on 10/6/2022. This letter will serve as a brief summary of the physical findings and laboratory/studies performed and recommendations at this time.         If you have any questions or concerns, please don't hesitate to call.    Sincerely,        Richar Patterson MD

## 2022-10-06 NOTE — PROGRESS NOTES
Subjective:       Patient ID: Oliver Burk Jr. is a 82 y.o. male.    Chief Complaint: No chief complaint on file.    HPI   81 yo  at [employer] [facility] comes for his periodic health exam. He recently from a 5  day trip to [facility]. Spent night before last with him celebrating out mutual friend's 82th birthday.  Review of Systems      Objective:      Physical Exam    Assessment:       Problem List Items Addressed This Visit    None  Visit Diagnoses       Annual physical exam    -  Primary              Plan:       ***

## 2022-10-07 ENCOUNTER — OFFICE VISIT (OUTPATIENT)
Dept: ELECTROPHYSIOLOGY | Facility: CLINIC | Age: 82
End: 2022-10-07
Payer: MEDICARE

## 2022-10-07 VITALS
HEIGHT: 72 IN | WEIGHT: 188.5 LBS | HEART RATE: 70 BPM | BODY MASS INDEX: 25.53 KG/M2 | SYSTOLIC BLOOD PRESSURE: 124 MMHG | DIASTOLIC BLOOD PRESSURE: 68 MMHG

## 2022-10-07 DIAGNOSIS — I10 HYPERTENSION, UNSPECIFIED TYPE: ICD-10-CM

## 2022-10-07 DIAGNOSIS — I48.19 PERSISTENT ATRIAL FIBRILLATION: Primary | ICD-10-CM

## 2022-10-07 DIAGNOSIS — I48.91 ATRIAL FIBRILLATION BY ELECTROCARDIOGRAM: ICD-10-CM

## 2022-10-07 DIAGNOSIS — I48.19 PERSISTENT ATRIAL FIBRILLATION: ICD-10-CM

## 2022-10-07 PROCEDURE — 99213 OFFICE O/P EST LOW 20 MIN: CPT | Mod: PBBFAC | Performed by: INTERNAL MEDICINE

## 2022-10-07 PROCEDURE — 99999 PR PBB SHADOW E&M-EST. PATIENT-LVL III: ICD-10-PCS | Mod: PBBFAC,,, | Performed by: INTERNAL MEDICINE

## 2022-10-07 PROCEDURE — 99205 OFFICE O/P NEW HI 60 MIN: CPT | Mod: S$PBB,,, | Performed by: INTERNAL MEDICINE

## 2022-10-07 PROCEDURE — 99205 PR OFFICE/OUTPT VISIT, NEW, LEVL V, 60-74 MIN: ICD-10-PCS | Mod: S$PBB,,, | Performed by: INTERNAL MEDICINE

## 2022-10-07 PROCEDURE — 99999 PR PBB SHADOW E&M-EST. PATIENT-LVL III: CPT | Mod: PBBFAC,,, | Performed by: INTERNAL MEDICINE

## 2022-10-07 RX ORDER — METOPROLOL SUCCINATE 50 MG/1
50 TABLET, EXTENDED RELEASE ORAL DAILY
Qty: 90 TABLET | Refills: 3 | Status: SHIPPED | OUTPATIENT
Start: 2022-10-07 | End: 2023-01-20 | Stop reason: SDUPTHER

## 2022-10-07 NOTE — PROGRESS NOTES
Subjective:    Patient ID:  Oliver Burk Jr. is a 82 y.o. male who presents for evaluation of AF    HPI  82 y.o. M  referred by his friend Dr QUINTIN Benton [place]  HTN on meds  HL on meds   BPH  spinal stenosis c/b bilat foot drop    Newly discovered asymptomatic AF with RVR.  Works out without a problem.    7/2020 ECG: SR  TSH wnl    My interpretation of today's ECG is AF with RVR    Review of Systems   Constitutional: Negative. Negative for malaise/fatigue.   HENT: Negative.  Negative for ear pain and tinnitus.    Eyes:  Negative for blurred vision.   Cardiovascular: Negative.  Negative for chest pain, dyspnea on exertion, near-syncope, palpitations and syncope.   Respiratory: Negative.  Negative for shortness of breath.    Endocrine: Negative.  Negative for polyuria.   Hematologic/Lymphatic: Does not bruise/bleed easily.   Skin: Negative.  Negative for rash.   Musculoskeletal: Negative.  Negative for joint pain and muscle weakness.   Gastrointestinal: Negative.  Negative for abdominal pain and change in bowel habit.   Genitourinary:  Negative for frequency.   Neurological: Negative.  Negative for dizziness and weakness. Focal weakness: bilateral foot drop.  Psychiatric/Behavioral: Negative.  Negative for depression. The patient is not nervous/anxious.    Allergic/Immunologic: Negative for environmental allergies.      Objective:    Physical Exam  Vitals and nursing note reviewed.   Constitutional:       Appearance: He is well-developed.   HENT:      Head: Normocephalic and atraumatic.   Eyes:      General: Lids are normal. No scleral icterus.     Conjunctiva/sclera: Conjunctivae normal.   Neck:      Thyroid: No thyromegaly.      Vascular: No JVD.      Trachea: No tracheal deviation.   Cardiovascular:      Rate and Rhythm: Tachycardia present. Rhythm irregular.      Pulses:           Radial pulses are 2+ on the right side and 2+ on the left side.   Pulmonary:      Effort: Pulmonary effort is normal. No  tachypnea, accessory muscle usage or respiratory distress.      Breath sounds: No wheezing.   Abdominal:      General: There is no distension.   Musculoskeletal:         General: Normal range of motion.      Cervical back: Normal range of motion.   Skin:     General: Skin is warm and dry.   Neurological:      Mental Status: He is alert and oriented to person, place, and time.      Motor: No abnormal muscle tone.      Deep Tendon Reflexes: Reflexes are normal and symmetric.   Psychiatric:         Behavior: Behavior normal.         Assessment:       1. Atrial fibrillation by electrocardiogram    2. Persistent atrial fibrillation    3. Hypertension, unspecified type         Plan:       Discussed AF and its basic pathophysiology, including its health implications and treatment options (rate vs. rhythm control, meds vs. procedural/device treatment) as appropriate for the patient.    Change ACEi to BB for rate control.  Xarelto for primary CVA prophylaxis.  RAINER/DCCV and place Bardy monitor. Further Rx (including rate control vs. rhythm control) will depend on sx and whether AF recurs.    I discussed with patient risks, indications, benefits, and alternatives of the planned procedure. All questions were answered. Patient understands and wishes to proceed.

## 2022-10-07 NOTE — H&P (VIEW-ONLY)
Subjective:    Patient ID:  Oliver Burk Jr. is a 82 y.o. male who presents for evaluation of AF    HPI  82 y.o. M  referred by his friend Dr QUINTIN Benton [place]  HTN on meds  HL on meds   BPH  spinal stenosis c/b bilat foot drop    Newly discovered asymptomatic AF with RVR.  Works out without a problem.    7/2020 ECG: SR  TSH wnl    My interpretation of today's ECG is AF with RVR    Review of Systems   Constitutional: Negative. Negative for malaise/fatigue.   HENT: Negative.  Negative for ear pain and tinnitus.    Eyes:  Negative for blurred vision.   Cardiovascular: Negative.  Negative for chest pain, dyspnea on exertion, near-syncope, palpitations and syncope.   Respiratory: Negative.  Negative for shortness of breath.    Endocrine: Negative.  Negative for polyuria.   Hematologic/Lymphatic: Does not bruise/bleed easily.   Skin: Negative.  Negative for rash.   Musculoskeletal: Negative.  Negative for joint pain and muscle weakness.   Gastrointestinal: Negative.  Negative for abdominal pain and change in bowel habit.   Genitourinary:  Negative for frequency.   Neurological: Negative.  Negative for dizziness and weakness. Focal weakness: bilateral foot drop.  Psychiatric/Behavioral: Negative.  Negative for depression. The patient is not nervous/anxious.    Allergic/Immunologic: Negative for environmental allergies.      Objective:    Physical Exam  Vitals and nursing note reviewed.   Constitutional:       Appearance: He is well-developed.   HENT:      Head: Normocephalic and atraumatic.   Eyes:      General: Lids are normal. No scleral icterus.     Conjunctiva/sclera: Conjunctivae normal.   Neck:      Thyroid: No thyromegaly.      Vascular: No JVD.      Trachea: No tracheal deviation.   Cardiovascular:      Rate and Rhythm: Tachycardia present. Rhythm irregular.      Pulses:           Radial pulses are 2+ on the right side and 2+ on the left side.   Pulmonary:      Effort: Pulmonary effort is normal. No  tachypnea, accessory muscle usage or respiratory distress.      Breath sounds: No wheezing.   Abdominal:      General: There is no distension.   Musculoskeletal:         General: Normal range of motion.      Cervical back: Normal range of motion.   Skin:     General: Skin is warm and dry.   Neurological:      Mental Status: He is alert and oriented to person, place, and time.      Motor: No abnormal muscle tone.      Deep Tendon Reflexes: Reflexes are normal and symmetric.   Psychiatric:         Behavior: Behavior normal.         Assessment:       1. Atrial fibrillation by electrocardiogram    2. Persistent atrial fibrillation    3. Hypertension, unspecified type         Plan:       Discussed AF and its basic pathophysiology, including its health implications and treatment options (rate vs. rhythm control, meds vs. procedural/device treatment) as appropriate for the patient.    Change ACEi to BB for rate control.  Xarelto for primary CVA prophylaxis.  RAINER/DCCV and place Bardy monitor. Further Rx (including rate control vs. rhythm control) will depend on sx and whether AF recurs.    I discussed with patient risks, indications, benefits, and alternatives of the planned procedure. All questions were answered. Patient understands and wishes to proceed.

## 2022-10-14 ENCOUNTER — TELEPHONE (OUTPATIENT)
Dept: ELECTROPHYSIOLOGY | Facility: CLINIC | Age: 82
End: 2022-10-14
Payer: MEDICARE

## 2022-10-14 NOTE — TELEPHONE ENCOUNTER
Spoke to Oliver Burk    CONFIRMED procedure arrival time of 7:00am on Monday for Cardioversion with Dr. Barriga     Reiterated instructions including:  -Directions to check in desk  -NPO after midnight night prior to procedure  -Confirmed compliance of Xeralto, takes every evening with a meal.  Will take as usual the evening before.   Has not missed any doses.   -Pre-procedure LABS Completed and reviewed. No alerts.  Will need additional labs the morning of the procedure. -COVID test not required for vaccinated patients  -Confirmed absence or presence of implanted device/stimulator.Denies implants.     -Confirmed no fever, cough, or shortness of breath in the past 30 days  -Will be discharged with  holter monitor.  He was aware.    -Reviewed current visitor policy    Patient verbalized understanding of above and appreciated the call.

## 2022-10-17 ENCOUNTER — CLINICAL SUPPORT (OUTPATIENT)
Dept: CARDIOLOGY | Facility: HOSPITAL | Age: 82
End: 2022-10-17
Attending: INTERNAL MEDICINE
Payer: MEDICARE

## 2022-10-17 ENCOUNTER — ANESTHESIA EVENT (OUTPATIENT)
Dept: MEDSURG UNIT | Facility: HOSPITAL | Age: 82
End: 2022-10-17
Payer: MEDICARE

## 2022-10-17 ENCOUNTER — HOSPITAL ENCOUNTER (OUTPATIENT)
Dept: CARDIOLOGY | Facility: HOSPITAL | Age: 82
Discharge: HOME | End: 2022-10-17
Attending: INTERNAL MEDICINE
Payer: MEDICARE

## 2022-10-17 ENCOUNTER — ANESTHESIA (OUTPATIENT)
Dept: MEDSURG UNIT | Facility: HOSPITAL | Age: 82
End: 2022-10-17
Payer: MEDICARE

## 2022-10-17 ENCOUNTER — HOSPITAL ENCOUNTER (OUTPATIENT)
Facility: HOSPITAL | Age: 82
Discharge: HOME | End: 2022-10-17
Attending: INTERNAL MEDICINE | Admitting: INTERNAL MEDICINE
Payer: MEDICARE

## 2022-10-17 VITALS
DIASTOLIC BLOOD PRESSURE: 77 MMHG | RESPIRATION RATE: 12 BRPM | OXYGEN SATURATION: 97 % | SYSTOLIC BLOOD PRESSURE: 122 MMHG | TEMPERATURE: 97 F | BODY MASS INDEX: 25.06 KG/M2 | HEART RATE: 80 BPM | WEIGHT: 185 LBS | HEIGHT: 72 IN

## 2022-10-17 VITALS
WEIGHT: 185 LBS | BODY MASS INDEX: 25.06 KG/M2 | DIASTOLIC BLOOD PRESSURE: 92 MMHG | SYSTOLIC BLOOD PRESSURE: 142 MMHG | HEIGHT: 72 IN

## 2022-10-17 DIAGNOSIS — I48.19 PERSISTENT ATRIAL FIBRILLATION: ICD-10-CM

## 2022-10-17 DIAGNOSIS — I48.91 ATRIAL FIBRILLATION: Primary | ICD-10-CM

## 2022-10-17 LAB
APTT BLDCRRT: 31.3 SEC (ref 21–32)
ASCENDING AORTA: 3.5 CM
BSA FOR ECHO PROCEDURE: 2.06 M2
EJECTION FRACTION: 55 %
INR PPP: 1.2 (ref 0.8–1.2)
PROTHROMBIN TIME: 12.4 SEC (ref 9–12.5)
SINUS: 3.3 CM
STJ: 3 CM

## 2022-10-17 PROCEDURE — 25000003 PHARM REV CODE 250: Performed by: NURSE ANESTHETIST, CERTIFIED REGISTERED

## 2022-10-17 PROCEDURE — 93320 TRANSESOPHAGEAL ECHO (TEE) (CUPID ONLY): ICD-10-PCS | Mod: 26,,, | Performed by: INTERNAL MEDICINE

## 2022-10-17 PROCEDURE — 63600175 PHARM REV CODE 636 W HCPCS: Performed by: NURSE ANESTHETIST, CERTIFIED REGISTERED

## 2022-10-17 PROCEDURE — 93325 DOPPLER ECHO COLOR FLOW MAPG: CPT

## 2022-10-17 PROCEDURE — D9220A PRA ANESTHESIA: Mod: ANES,,, | Performed by: ANESTHESIOLOGY

## 2022-10-17 PROCEDURE — D9220A PRA ANESTHESIA: ICD-10-PCS | Mod: CRNA,,, | Performed by: NURSE ANESTHETIST, CERTIFIED REGISTERED

## 2022-10-17 PROCEDURE — 93248 CV CARDIAC MONITOR - 3-15 DAY ADULT (CUPID ONLY): ICD-10-PCS | Mod: ,,, | Performed by: INTERNAL MEDICINE

## 2022-10-17 PROCEDURE — 93320 DOPPLER ECHO COMPLETE: CPT | Mod: 26,,, | Performed by: INTERNAL MEDICINE

## 2022-10-17 PROCEDURE — 85730 THROMBOPLASTIN TIME PARTIAL: CPT | Performed by: INTERNAL MEDICINE

## 2022-10-17 PROCEDURE — 93312 TRANSESOPHAGEAL ECHO (TEE) (CUPID ONLY): ICD-10-PCS | Mod: 26,,, | Performed by: INTERNAL MEDICINE

## 2022-10-17 PROCEDURE — 85610 PROTHROMBIN TIME: CPT | Performed by: INTERNAL MEDICINE

## 2022-10-17 PROCEDURE — 93325 TRANSESOPHAGEAL ECHO (TEE) (CUPID ONLY): ICD-10-PCS | Mod: 26,,, | Performed by: INTERNAL MEDICINE

## 2022-10-17 PROCEDURE — D9220A PRA ANESTHESIA: Mod: CRNA,,, | Performed by: NURSE ANESTHETIST, CERTIFIED REGISTERED

## 2022-10-17 PROCEDURE — 37000008 HC ANESTHESIA 1ST 15 MINUTES: Performed by: STUDENT IN AN ORGANIZED HEALTH CARE EDUCATION/TRAINING PROGRAM

## 2022-10-17 PROCEDURE — 93010 ELECTROCARDIOGRAM REPORT: CPT | Mod: ,,, | Performed by: INTERNAL MEDICINE

## 2022-10-17 PROCEDURE — D9220A PRA ANESTHESIA: ICD-10-PCS | Mod: ANES,,, | Performed by: ANESTHESIOLOGY

## 2022-10-17 PROCEDURE — 93248 EXT ECG>7D<15D REV&INTERPJ: CPT | Mod: ,,, | Performed by: INTERNAL MEDICINE

## 2022-10-17 PROCEDURE — 37000009 HC ANESTHESIA EA ADD 15 MINS: Performed by: STUDENT IN AN ORGANIZED HEALTH CARE EDUCATION/TRAINING PROGRAM

## 2022-10-17 PROCEDURE — 93325 DOPPLER ECHO COLOR FLOW MAPG: CPT | Mod: 26,,, | Performed by: INTERNAL MEDICINE

## 2022-10-17 PROCEDURE — 92960 PR CARDIOVERSION, ELECTIVE;EXTERN: ICD-10-PCS | Mod: ,,, | Performed by: STUDENT IN AN ORGANIZED HEALTH CARE EDUCATION/TRAINING PROGRAM

## 2022-10-17 PROCEDURE — 93005 ELECTROCARDIOGRAM TRACING: CPT

## 2022-10-17 PROCEDURE — 93010 EKG 12-LEAD: ICD-10-PCS | Mod: ,,, | Performed by: INTERNAL MEDICINE

## 2022-10-17 PROCEDURE — 92960 CARDIOVERSION ELECTRIC EXT: CPT | Mod: ,,, | Performed by: STUDENT IN AN ORGANIZED HEALTH CARE EDUCATION/TRAINING PROGRAM

## 2022-10-17 PROCEDURE — 93312 ECHO TRANSESOPHAGEAL: CPT | Mod: 26,,, | Performed by: INTERNAL MEDICINE

## 2022-10-17 PROCEDURE — 93005 ELECTROCARDIOGRAM TRACING: CPT | Mod: 59

## 2022-10-17 PROCEDURE — 92960 CARDIOVERSION ELECTRIC EXT: CPT | Performed by: STUDENT IN AN ORGANIZED HEALTH CARE EDUCATION/TRAINING PROGRAM

## 2022-10-17 RX ORDER — PROPOFOL 10 MG/ML
VIAL (ML) INTRAVENOUS CONTINUOUS PRN
Status: DISCONTINUED | OUTPATIENT
Start: 2022-10-17 | End: 2022-10-17

## 2022-10-17 RX ORDER — FENTANYL CITRATE 50 UG/ML
25 INJECTION, SOLUTION INTRAMUSCULAR; INTRAVENOUS EVERY 5 MIN PRN
Status: DISCONTINUED | OUTPATIENT
Start: 2022-10-17 | End: 2022-10-17 | Stop reason: HOSPADM

## 2022-10-17 RX ORDER — PHENYLEPHRINE HYDROCHLORIDE 10 MG/ML
INJECTION INTRAVENOUS
Status: DISCONTINUED | OUTPATIENT
Start: 2022-10-17 | End: 2022-10-17

## 2022-10-17 RX ORDER — LIDOCAINE HYDROCHLORIDE 20 MG/ML
INJECTION INTRAVENOUS
Status: DISCONTINUED | OUTPATIENT
Start: 2022-10-17 | End: 2022-10-17

## 2022-10-17 RX ORDER — LIDOCAINE HYDROCHLORIDE 20 MG/ML
SOLUTION OROPHARYNGEAL
Status: DISCONTINUED | OUTPATIENT
Start: 2022-10-17 | End: 2022-10-17

## 2022-10-17 RX ORDER — PROPOFOL 10 MG/ML
VIAL (ML) INTRAVENOUS
Status: DISCONTINUED | OUTPATIENT
Start: 2022-10-17 | End: 2022-10-17

## 2022-10-17 RX ORDER — ONDANSETRON 2 MG/ML
4 INJECTION INTRAMUSCULAR; INTRAVENOUS DAILY PRN
Status: DISCONTINUED | OUTPATIENT
Start: 2022-10-17 | End: 2022-10-17 | Stop reason: HOSPADM

## 2022-10-17 RX ADMIN — PROPOFOL 100 MG: 10 INJECTION, EMULSION INTRAVENOUS at 09:10

## 2022-10-17 RX ADMIN — LIDOCAINE HYDROCHLORIDE 15 ML: 20 SOLUTION ORAL; TOPICAL at 09:10

## 2022-10-17 RX ADMIN — PROPOFOL 75 MCG/KG/MIN: 10 INJECTION, EMULSION INTRAVENOUS at 09:10

## 2022-10-17 RX ADMIN — PHENYLEPHRINE HYDROCHLORIDE 150 MCG: 10 INJECTION INTRAVENOUS at 09:10

## 2022-10-17 RX ADMIN — LIDOCAINE HYDROCHLORIDE 100 MG: 20 INJECTION INTRAVENOUS at 09:10

## 2022-10-17 RX ADMIN — SODIUM CHLORIDE: 0.9 INJECTION, SOLUTION INTRAVENOUS at 08:10

## 2022-10-17 RX ADMIN — GLYCOPYRROLATE 0.2 MG: 0.2 INJECTION, SOLUTION INTRAMUSCULAR; INTRAVITREAL at 09:10

## 2022-10-17 NOTE — TRANSFER OF CARE
Anesthesia Transfer of Care Note    Patient: Oliver Burk Jr.    Procedure(s) Performed: Procedure(s) (LRB):  CARDIOVERSION (N/A)  Transesophageal echo (RAINER) intra-procedure log documentation (N/A)    Patient location: PACU    Anesthesia Type: general    Transport from OR: Transported from OR on 6-10 L/min O2 by face mask with adequate spontaneous ventilation    Post pain: adequate analgesia    Post assessment: no apparent anesthetic complications and tolerated procedure well    Post vital signs: stable    Level of consciousness: awake and alert    Nausea/Vomiting: no nausea/vomiting    Complications: none    Transfer of care protocol was followed      Last vitals:   Visit Vitals  /69 (BP Location: Left arm, Patient Position: Lying)   Pulse (!) 111   Temp 36.6 °C (97.8 °F) (Temporal)   Resp 18   Ht 6' (1.829 m)   Wt 83.9 kg (185 lb)   SpO2 96%   BMI 25.09 kg/m²

## 2022-10-17 NOTE — DISCHARGE SUMMARY
[facility]  Cardiac Electrophysiology  Discharge Summary      Patient Name: Oliver Burk Jr.  MRN: [id number]  Admission Date: 10/17/2022  Hospital Length of Stay: 0 days  Discharge Date and Time: 10/17/2022 11:31 AM  Attending Physician: Mikel Barriga MD  Discharging Provider: Rosa Sewell NP  Primary Care Physician: Chetan Patterson MD    HPI: Mr. Burk is a 82 year old male with new onset AF who presents today to [facility] for scheduled RAINER/DCCV with Dr. Barriga. Today he presents in AF with RVR. He denies any chest pain, palpitations, SOB, KILPATRICK, dizziness, light headedness, weakness, LE swelling, syncope, or near syncopal episodes. He denies any bleeding, infections, fevers, rashes, or surgeries in the past 30 days. VSS. He is currently taking torpol-xl 50 mg daily and xarelto 20 mg daily (last dose taken last night). He does note that he snores at night and awakens tired. He has been told by his wife that he has apneic episodes. Will refer to sleep medicine for evaluation for sleep apnea.      ECG today reveals AF with RVR at 110 BPM.     Procedure(s) (LRB):  CARDIOVERSION (N/A)  Transesophageal echo (RAINER) intra-procedure log documentation (N/A)     Indwelling Lines/Drains at time of discharge:  Lines/Drains/Airways     Airway  Duration                Airway - Non-Surgical 01/14/19 1230 Nasal Cannula 1372 days              Hospital Course: Mr Burk presented in AF and currently on torpol-xl 50 mg daily and xarelto 20 mg daily. He underwent RAINER without evidence of RJ thrombus. Proceeded with DCCV 200 J x 1 shock, converting from AF to sinus rhythm. He tolerated the procedure without any acute complications. Post-DCCV ECG revealed sinus rhythm at 75 bpm  ms QRS 84 ms QT/QTc 402/448 ms. Plan to resume home medications including  torpol-xl 50 mg daily and xarelto 20 mg daily. Instructed to return in 4 weeks for clinic follow up.Discharged wearing bardy monitor x 14 days. Referral  to sleep medicine for evaluation for sleep apnea. Mr. Burk was assessed at bedside prior to discharge. He reported feeling well and denied chest discomfort, shortness of breath, palpitations, lightheadedness, or any other acute symptoms. Discharge instructions were discussed and all questions were answered. He was discharged home in stable condition.     Goals of Care Treatment Preferences:    Living Will: Yes    Consults: None    Significant Diagnostic Studies: Labs from 10/6/22 through today reviewed    Pending Diagnostic Studies:     None        Final Active Diagnoses:    Diagnosis Date Noted POA    PRINCIPAL PROBLEM:  Persistent atrial fibrillation [I48.19] 10/07/2022 Yes      Problems Resolved During this Admission:     Discharged Condition: stable    Disposition: Home or Self Care    Follow Up:   Follow-up Information     Mikel Barriga MD Follow up in 1 month(s).    Specialties: Electrophysiology, Cardiology  Why: Post cardioversion, post 14 day bardmarc  Contact information:  [address]  [facility] [address]  [phone]                       Patient Instructions:      Ambulatory referral/consult to Sleep Disorders   Standing Status: Future   Referral Priority: Routine Referral Type: Consultation   Requested Specialty: Sleep Medicine   Number of Visits Requested: 1     No driving until:   Order Comments: No driving or operating heavy machinery for 24-48 hours after your procedure because you received sedation.     Notify your health care provider if you experience any of the following:  increased confusion or weakness     Notify your health care provider if you experience any of the following:  persistent dizziness, light-headedness, or visual disturbances     Notify your health care provider if you experience any of the following:  worsening rash     Notify your health care provider if you experience any of the following:  severe persistent headache     Notify your health care provider if you  experience any of the following:  difficulty breathing or increased cough     Notify your health care provider if you experience any of the following:  redness, tenderness, or signs of infection (pain, swelling, redness, odor or green/yellow discharge around incision site)     Notify your health care provider if you experience any of the following:  persistent nausea and vomiting or diarrhea     Notify your health care provider if you experience any of the following:  temperature >100.4     Notify your health care provider if you experience any of the following:  severe uncontrolled pain     Other restrictions (specify):   Order Comments: Medications:  -Continue to take your home medications as listed on your medication list after you are discharged.    New Medications:  None     Diet  -You may resume oral intake after you are discharged, as long you have no swallowing difficulties.    Because you have received sedation for this procedure:  -Limit activity for the remainder of the day.  -Do not smoke for at least 6 hours and until you are fully awake and alert.  -Do not drink alcoholic beverage for 24 hours.  -Do not drive for 24 hours.  -Defer important decision making until the following day.     Go to the Emergency Department if you develop:   -Bleeding  -Weakness or numbness  -Visual, gait or speech disturbance  -New chest pain, palpitations, shortness of breath, rapid heart beat, or fainting  -Fever    Follow up:  -You will be sent home wearing a 14-day cardiac monitor.  -Referral to sleep medicine.  -Dr. Mikel Barriga  in 1 month.     Any need to reschedule or cancel procedures, or any questions regarding your procedures should be addressed directly with the Arrhythmia Department Nurses at the following phone number: [phone].     Medications:  Reconciled Home Medications:      Medication List      CONTINUE taking these medications    diclofenac sodium 1 % Gel  Commonly known as: VOLTAREN  Apply 1 Tube  topically 2 (two) times daily as needed.     * gabapentin 300 MG capsule  Commonly known as: NEURONTIN  1 capsule once daily.     * gabapentin 300 MG capsule  Commonly known as: NEURONTIN  Take 1 capsule (300 mg total) by mouth 3 (three) times daily.     ibuprofen 200 mg Cap  Take by mouth.     meloxicam 15 MG tablet  Commonly known as: MOBIC  1 tablet once daily.     metoprolol succinate 50 MG 24 hr tablet  Commonly known as: TOPROL-XL  Take 1 tablet (50 mg total) by mouth once daily.     naproxen sodium 220 MG tablet  Commonly known as: ANAPROX  Take 220 mg by mouth as needed.     rivaroxaban 20 mg Tab  Commonly known as: XARELTO  Take 1 tablet (20 mg total) by mouth daily with dinner or evening meal.     sildenafiL 100 MG tablet  Commonly known as: VIAGRA  Take 1 tablet by mouth 1 hour prior to intercourse. Take on empty stomach     simvastatin 20 MG tablet  Commonly known as: ZOCOR  TAKE ONE TABLET BY MOUTH ONCE DAILY     tamsulosin 0.4 mg Cap  Commonly known as: FLOMAX  TAKE ONE CAPSULE BY MOUTH ONCE DAILY AT BEDTIME         * This list has 2 medication(s) that are the same as other medications prescribed for you. Read the directions carefully, and ask your doctor or other care provider to review them with you.              Plan:  -Continue all home medications  -Bardy monitor x 14 days placed at discharge.  -Referral to sleep medicine for evaluation for sleep apnea.    -Follow up with Dr. Barriga in 1 month.    Time spent on the discharge of patient: 13 minutes    Rosa Sewell NP  Cardiac Electrophysiology  [facility] - Arrhythmia    Attending: Mikel Barriga MD

## 2022-10-17 NOTE — CONSULTS
[facility]  Cardiology  Consult Note    Patient Name: Oliver Burk Jr.  MRN: [id number]  Admission Date: 10/17/2022  Hospital Length of Stay: 0 days  Code Status: No Order   Attending Provider: Mikel Barriga MD   Consulting Provider: Danuta Peralta PA-C  Primary Care Physician: Chetan Patterson MD  Principal Problem:<principal problem not specified>    Patient information was obtained from patient and past medical records.     Consults  Subjective:     Chief Complaint:  Atrial fibrillation     HPI:   Mr. Burk is an 82 y.o. M with a PMHx of HTN, HLD, and newly discovered asymptomatic AF with RVR. He was recently started on BB and Xarelto. He presents today for RAINER/DCCV and place Bardy monitor.     Anticoagulant/antiplatelets: Xarelto 20 mg daily   ECG: AF with RVR     Dysphagia or odynophagia:  No  Liver Disease, esophageal disease, or known varices:  No  Upper GI Bleeding: No  Snoring:  Yes  Sleep Apnea:  No  Prior neck surgery or radiation:  No  History of anesthetic difficulties:  No  Family history of anesthetic difficulties:  No  Last oral intake: yesterday before midnight  Able to move neck in all directions:  Yes  Platelet count: 207k  INR: 1.2        Past Medical History:   Diagnosis Date    Arthritis     Basal cell carcinoma     Cancer     prostate    Hyperlipidemia     Hypertension     on medication       Past Surgical History:   Procedure Laterality Date    APPENDECTOMY      CATARACT EXTRACTION Right 01/14/2019    Dr. Mendez (symfony lens)    CATARACT EXTRACTION W/  INTRAOCULAR LENS IMPLANT Right 01/14/2019    Procedure: EXTRACTION, CATARACT, WITH IOL INSERTION;  Surgeon: Mu Mendez MD;  Location: [facility] OR;  Service: Ophthalmology;  Laterality: Right;  Laser    CATARACT EXTRACTION W/  INTRAOCULAR LENS IMPLANT Left 01/28/2019    Procedure: EXTRACTION, CATARACT, WITH IOL INSERTION;  Surgeon: Mu Mendez MD;  Location: [facility] OR;  Service: Ophthalmology;   Laterality: Left;  Laser    EYE SURGERY      LUMBAR LAMINECTOMY  2004    PROSTATE SURGERY  2010       Review of patient's allergies indicates:  No Known Allergies    No current facility-administered medications on file prior to encounter.     Current Outpatient Medications on File Prior to Encounter   Medication Sig    gabapentin (NEURONTIN) 300 MG capsule Take 1 capsule (300 mg total) by mouth 3 (three) times daily.    meloxicam (MOBIC) 15 MG tablet 1 tablet once daily.    metoprolol succinate (TOPROL-XL) 50 MG 24 hr tablet Take 1 tablet (50 mg total) by mouth once daily.    rivaroxaban (XARELTO) 20 mg Tab Take 1 tablet (20 mg total) by mouth daily with dinner or evening meal.    simvastatin (ZOCOR) 20 MG tablet TAKE ONE TABLET BY MOUTH ONCE DAILY    tamsulosin (FLOMAX) 0.4 mg Cap TAKE ONE CAPSULE BY MOUTH ONCE DAILY AT BEDTIME    diclofenac sodium (VOLTAREN) 1 % Gel Apply 1 Tube topically 2 (two) times daily as needed.    gabapentin (NEURONTIN) 300 MG capsule 1 capsule once daily.    ibuprofen 200 mg Cap Take by mouth.    naproxen sodium (ANAPROX) 220 MG tablet Take 220 mg by mouth as needed.    sildenafil (VIAGRA) 100 MG tablet Take 1 tablet by mouth 1 hour prior to intercourse. Take on empty stomach     Family History       Problem Relation (Age of Onset)    Emphysema Mother    Heart failure Father          Tobacco Use    Smoking status: Never    Smokeless tobacco: Never   Substance and Sexual Activity    Alcohol use: Yes     Alcohol/week: 12.0 standard drinks     Types: 5 Shots of liquor, 7 Standard drinks or equivalent per week     Comment: 1 drink per night    Drug use: No    Sexual activity: Yes     Partners: Female     Review of Systems   Constitutional: Negative for diaphoresis, malaise/fatigue, weight gain and weight loss.   HENT:  Negative for nosebleeds.    Eyes:  Negative for vision loss in left eye, vision loss in right eye and visual disturbance.   Cardiovascular:  Negative for  chest pain, claudication, dyspnea on exertion, irregular heartbeat, leg swelling, near-syncope, orthopnea, palpitations, paroxysmal nocturnal dyspnea and syncope.   Respiratory:  Negative for cough, shortness of breath, sleep disturbances due to breathing, snoring and wheezing.    Hematologic/Lymphatic: Negative for bleeding problem. Does not bruise/bleed easily.   Skin:  Negative for poor wound healing and rash.   Musculoskeletal:  Negative for muscle cramps and myalgias.   Gastrointestinal:  Negative for bloating, abdominal pain, diarrhea, heartburn, melena, nausea and vomiting.   Genitourinary:  Negative for hematuria and nocturia.   Neurological:  Negative for brief paralysis, dizziness, headaches, light-headedness, numbness and weakness.   Psychiatric/Behavioral:  Negative for depression.    Allergic/Immunologic: Negative for hives.   Objective:     Vital Signs (Most Recent):  Temp: 97.8 °F (36.6 °C) (10/17/22 0729)  Pulse: (!) 111 (10/17/22 0729)  Resp: 18 (10/17/22 0729)  BP: 125/69 (10/17/22 0734)  SpO2: 96 % (10/17/22 0729)   Vital Signs (24h Range):  Temp:  [97.8 °F (36.6 °C)] 97.8 °F (36.6 °C)  Pulse:  [111] 111  Resp:  [18] 18  SpO2:  [96 %] 96 %  BP: (112-125)/(69-78) 125/69     Weight: 83.9 kg (185 lb)  Body mass index is 25.09 kg/m².    SpO2: 96 %  O2 Device (Oxygen Therapy): room air    No intake or output data in the 24 hours ending 10/17/22 0830    Lines/Drains/Airways       Airway  Duration                  Airway - Non-Surgical 01/14/19 1230 Nasal Cannula 1371 days              Peripheral Intravenous Line  Duration                  Peripheral IV - Single Lumen 10/17/22 0753 20 G Right Hand <1 day                    Physical Exam  Vitals and nursing note reviewed.   Constitutional:       Appearance: He is well-developed. He is not diaphoretic.   HENT:      Head: Normocephalic and atraumatic.   Eyes:      Conjunctiva/sclera: Conjunctivae normal.   Neck:      Vascular: No carotid bruit or JVD.    Cardiovascular:      Rate and Rhythm: Tachycardia present. Rhythm irregular.      Pulses: Normal pulses and intact distal pulses.      Heart sounds: Normal heart sounds. No murmur heard.    No friction rub. No gallop.   Pulmonary:      Effort: Pulmonary effort is normal.      Breath sounds: Normal breath sounds. No rales.   Chest:      Chest wall: No tenderness.   Abdominal:      General: There is no distension.      Palpations: Abdomen is soft. There is no mass.      Tenderness: There is no abdominal tenderness.   Skin:     General: Skin is warm and dry.      Coloration: Skin is not pale.   Neurological:      Mental Status: He is alert and oriented to person, place, and time.       Significant Labs: All pertinent lab results from the last 24 hours have been reviewed.    Assessment and Plan:     Persistent atrial fibrillation  Here today for RAINER/DCCV  -No absolute contraindications of esophageal stricture, tumor, perforation, laceration,or diverticulum and/or active GI bleed  -The risks, benefits & alternatives of the procedure were explained to the patient.   -The risks of transesophageal echo include but are not limited to:  Dental trauma, esophageal trauma/perforation, bleeding, laryngospasm/brochospasm, aspiration, sore throat/hoarseness, & dislodgement of the endotracheal tube/nasogastric tube (where applicable).    -The risks of ANES monitored sedation include hypotension, respiratory depression, arrhythmias, bronchospasm, & death.    -Informed consent was obtained. The patient is agreeable to proceed with the procedure and all questions and concerns addressed.    Case discussed with an attending in echocardiography lab.    Further recommendations per attending addendum          VTE Risk Mitigation (From admission, onward)    None          Thank you for your consult. I will sign off. Please contact us if you have any additional questions.    Danuta Peralta PA-C  Cardiology   [facility]  Cardiac Unit

## 2022-10-17 NOTE — ASSESSMENT & PLAN NOTE
Here today for RAINER/DCCV  -No absolute contraindications of esophageal stricture, tumor, perforation, laceration,or diverticulum and/or active GI bleed  -The risks, benefits & alternatives of the procedure were explained to the patient.   -The risks of transesophageal echo include but are not limited to:  Dental trauma, esophageal trauma/perforation, bleeding, laryngospasm/brochospasm, aspiration, sore throat/hoarseness, & dislodgement of the endotracheal tube/nasogastric tube (where applicable).    -The risks of ANES monitored sedation include hypotension, respiratory depression, arrhythmias, bronchospasm, & death.    -Informed consent was obtained. The patient is agreeable to proceed with the procedure and all questions and concerns addressed.    Case discussed with an attending in echocardiography lab.    Further recommendations per attending addendum

## 2022-10-17 NOTE — ANESTHESIA PREPROCEDURE EVALUATION
10/17/2022  Oliver Burk Jr. is a 82 y.o., male with atrial fibrillation here for RAINER and possible cardioversion.    Pre-operative evaluation for Procedure(s) (LRB):  CARDIOVERSION (N/A)  Transesophageal echo (RAINER) intra-procedure log documentation (N/A)        Patient Active Problem List   Diagnosis    Dermatochalasis - Both Eyes    Back pain    Radiculopathy of leg    Nuclear sclerosis, bilateral    Post-operative state    Nuclear sclerotic cataract of left eye    Persistent atrial fibrillation    HTN (hypertension)       Review of patient's allergies indicates:  No Known Allergies    No current facility-administered medications on file prior to encounter.     Current Outpatient Medications on File Prior to Encounter   Medication Sig Dispense Refill    diclofenac sodium (VOLTAREN) 1 % Gel Apply 1 Tube topically 2 (two) times daily as needed.      gabapentin (NEURONTIN) 300 MG capsule 1 capsule once daily.      gabapentin (NEURONTIN) 300 MG capsule Take 1 capsule (300 mg total) by mouth 3 (three) times daily. 90 capsule 11    ibuprofen 200 mg Cap Take by mouth.      meloxicam (MOBIC) 15 MG tablet 1 tablet once daily.  3    metoprolol succinate (TOPROL-XL) 50 MG 24 hr tablet Take 1 tablet (50 mg total) by mouth once daily. 90 tablet 3    naproxen sodium (ANAPROX) 220 MG tablet Take 220 mg by mouth as needed.      rivaroxaban (XARELTO) 20 mg Tab Take 1 tablet (20 mg total) by mouth daily with dinner or evening meal. 90 tablet 3    sildenafil (VIAGRA) 100 MG tablet Take 1 tablet by mouth 1 hour prior to intercourse. Take on empty stomach 10 tablet 11    simvastatin (ZOCOR) 20 MG tablet TAKE ONE TABLET BY MOUTH ONCE DAILY 30 tablet 12    tamsulosin (FLOMAX) 0.4 mg Cap TAKE ONE CAPSULE BY MOUTH ONCE DAILY AT BEDTIME 30 capsule 12       Past Surgical History:   Procedure Laterality Date     CATARACT EXTRACTION Right 01/14/2019    Dr. Mendez (symfony lens)    CATARACT EXTRACTION W/  INTRAOCULAR LENS IMPLANT Right 1/14/2019    Procedure: EXTRACTION, CATARACT, WITH IOL INSERTION;  Surgeon: Mu Mendez MD;  Location: [facility];  Service: Ophthalmology;  Laterality: Right;  Laser    CATARACT EXTRACTION W/  INTRAOCULAR LENS IMPLANT Left 1/28/2019    Procedure: EXTRACTION, CATARACT, WITH IOL INSERTION;  Surgeon: Mu Mendez MD;  Location: [facility];  Service: Ophthalmology;  Laterality: Left;  Laser    LUMBAR LAMINECTOMY  2004    PROSTATE SURGERY  2010       Social History     Socioeconomic History    Marital status:    Tobacco Use    Smoking status: Never    Smokeless tobacco: Never   Substance and Sexual Activity    Alcohol use: Yes     Alcohol/week: 12.0 standard drinks     Types: 5 Shots of liquor, 7 Standard drinks or equivalent per week     Comment: 1 drink per night    Drug use: No    Sexual activity: Yes     Partners: Female         CBC: No results for input(s): WBC, RBC, HGB, HCT, PLT, MCV, MCH, MCHC in the last 72 hours.    CMP: No results for input(s): NA, K, CL, CO2, BUN, CREATININE, GLU, MG, PHOS, CALCIUM, ALBUMIN, PROT, ALKPHOS, ALT, AST, BILITOT in the last 72 hours.    INR  No results for input(s): PT, INR, PROTIME, APTT in the last 72 hours.              2D Echo:  No results found for this or any previous visit.        Pre-op Assessment    I have reviewed the Patient Summary Reports.     I have reviewed the Nursing Notes.    I have reviewed the Medications.     Review of Systems  Anesthesia Hx:  No problems with previous Anesthesia    Hematology/Oncology:  Hematology Normal   Oncology Normal     EENT/Dental:EENT/Dental Normal   Cardiovascular:   Hypertension Dysrhythmias atrial fibrillation    Pulmonary:  Pulmonary Normal    Renal/:  Renal/ Normal     Hepatic/GI:  Hepatic/GI Normal    Musculoskeletal:  Musculoskeletal Normal    Neurological:   Neuromuscular Disease,     Endocrine:  Endocrine Normal    Dermatological:  Skin Normal    Psych:  Psychiatric Normal           Physical Exam  General: Well nourished    Airway:  Mallampati: II   Mouth Opening: Normal  TM Distance: Normal  Tongue: Normal  Neck ROM: Normal ROM    Dental:  Intact    Chest/Lungs:  Clear to auscultation, Normal Respiratory Rate    Heart:  Rate: Normal  Rhythm: Regular Rhythm  Sounds: Normal        Anesthesia Plan  Type of Anesthesia, risks & benefits discussed:    Anesthesia Type: Gen Natural Airway  Intra-op Monitoring Plan: Standard ASA Monitors  Post Op Pain Control Plan: multimodal analgesia  Induction:  IV  Informed Consent: Informed consent signed with the Patient and all parties understand the risks and agree with anesthesia plan.  All questions answered.   ASA Score: 3    Ready For Surgery From Anesthesia Perspective.     .

## 2022-10-17 NOTE — SUBJECTIVE & OBJECTIVE
Past Medical History:   Diagnosis Date    Arthritis     Basal cell carcinoma     Cancer     prostate    Hyperlipidemia     Hypertension     on medication       Past Surgical History:   Procedure Laterality Date    APPENDECTOMY      CATARACT EXTRACTION Right 01/14/2019    Dr. Mendez (symfony lens)    CATARACT EXTRACTION W/  INTRAOCULAR LENS IMPLANT Right 01/14/2019    Procedure: EXTRACTION, CATARACT, WITH IOL INSERTION;  Surgeon: Mu Mendez MD;  Location: [facility];  Service: Ophthalmology;  Laterality: Right;  Laser    CATARACT EXTRACTION W/  INTRAOCULAR LENS IMPLANT Left 01/28/2019    Procedure: EXTRACTION, CATARACT, WITH IOL INSERTION;  Surgeon: Mu Mendez MD;  Location: [facility];  Service: Ophthalmology;  Laterality: Left;  Laser    EYE SURGERY      LUMBAR LAMINECTOMY  2004    PROSTATE SURGERY  2010       Review of patient's allergies indicates:  No Known Allergies    No current facility-administered medications on file prior to encounter.     Current Outpatient Medications on File Prior to Encounter   Medication Sig    gabapentin (NEURONTIN) 300 MG capsule Take 1 capsule (300 mg total) by mouth 3 (three) times daily.    meloxicam (MOBIC) 15 MG tablet 1 tablet once daily.    metoprolol succinate (TOPROL-XL) 50 MG 24 hr tablet Take 1 tablet (50 mg total) by mouth once daily.    rivaroxaban (XARELTO) 20 mg Tab Take 1 tablet (20 mg total) by mouth daily with dinner or evening meal.    simvastatin (ZOCOR) 20 MG tablet TAKE ONE TABLET BY MOUTH ONCE DAILY    tamsulosin (FLOMAX) 0.4 mg Cap TAKE ONE CAPSULE BY MOUTH ONCE DAILY AT BEDTIME    diclofenac sodium (VOLTAREN) 1 % Gel Apply 1 Tube topically 2 (two) times daily as needed.    gabapentin (NEURONTIN) 300 MG capsule 1 capsule once daily.    ibuprofen 200 mg Cap Take by mouth.    naproxen sodium (ANAPROX) 220 MG tablet Take 220 mg by mouth as needed.    sildenafil (VIAGRA) 100 MG tablet Take 1 tablet by mouth 1 hour prior to intercourse. Take on empty stomach      Family History       Problem Relation (Age of Onset)    Emphysema Mother    Heart failure Father          Tobacco Use    Smoking status: Never    Smokeless tobacco: Never   Substance and Sexual Activity    Alcohol use: Yes     Alcohol/week: 12.0 standard drinks     Types: 5 Shots of liquor, 7 Standard drinks or equivalent per week     Comment: 1 drink per night    Drug use: No    Sexual activity: Yes     Partners: Female     Review of Systems   Constitutional: Negative for diaphoresis, malaise/fatigue, weight gain and weight loss.   HENT:  Negative for nosebleeds.    Eyes:  Negative for vision loss in left eye, vision loss in right eye and visual disturbance.   Cardiovascular:  Negative for chest pain, claudication, dyspnea on exertion, irregular heartbeat, leg swelling, near-syncope, orthopnea, palpitations, paroxysmal nocturnal dyspnea and syncope.   Respiratory:  Negative for cough, shortness of breath, sleep disturbances due to breathing, snoring and wheezing.    Hematologic/Lymphatic: Negative for bleeding problem. Does not bruise/bleed easily.   Skin:  Negative for poor wound healing and rash.   Musculoskeletal:  Negative for muscle cramps and myalgias.   Gastrointestinal:  Negative for bloating, abdominal pain, diarrhea, heartburn, melena, nausea and vomiting.   Genitourinary:  Negative for hematuria and nocturia.   Neurological:  Negative for brief paralysis, dizziness, headaches, light-headedness, numbness and weakness.   Psychiatric/Behavioral:  Negative for depression.    Allergic/Immunologic: Negative for hives.   Objective:     Vital Signs (Most Recent):  Temp: 97.8 °F (36.6 °C) (10/17/22 0729)  Pulse: (!) 111 (10/17/22 0729)  Resp: 18 (10/17/22 0729)  BP: 125/69 (10/17/22 0734)  SpO2: 96 % (10/17/22 0729)   Vital Signs (24h Range):  Temp:  [97.8 °F (36.6 °C)] 97.8 °F (36.6 °C)  Pulse:  [111] 111  Resp:  [18] 18  SpO2:  [96 %] 96 %  BP: (112-125)/(69-78) 125/69     Weight: 83.9 kg (185 lb)  Body  mass index is 25.09 kg/m².    SpO2: 96 %  O2 Device (Oxygen Therapy): room air    No intake or output data in the 24 hours ending 10/17/22 0830    Lines/Drains/Airways       Airway  Duration                  Airway - Non-Surgical 01/14/19 1230 Nasal Cannula 1371 days              Peripheral Intravenous Line  Duration                  Peripheral IV - Single Lumen 10/17/22 0753 20 G Right Hand <1 day                    Physical Exam  Vitals and nursing note reviewed.   Constitutional:       Appearance: He is well-developed. He is not diaphoretic.   HENT:      Head: Normocephalic and atraumatic.   Eyes:      Conjunctiva/sclera: Conjunctivae normal.   Neck:      Vascular: No carotid bruit or JVD.   Cardiovascular:      Rate and Rhythm: Tachycardia present. Rhythm irregular.      Pulses: Normal pulses and intact distal pulses.      Heart sounds: Normal heart sounds. No murmur heard.    No friction rub. No gallop.   Pulmonary:      Effort: Pulmonary effort is normal.      Breath sounds: Normal breath sounds. No rales.   Chest:      Chest wall: No tenderness.   Abdominal:      General: There is no distension.      Palpations: Abdomen is soft. There is no mass.      Tenderness: There is no abdominal tenderness.   Skin:     General: Skin is warm and dry.      Coloration: Skin is not pale.   Neurological:      Mental Status: He is alert and oriented to person, place, and time.       Significant Labs: All pertinent lab results from the last 24 hours have been reviewed.

## 2022-10-17 NOTE — INTERVAL H&P NOTE
Mr. Burk is a 82 year old male with new onset AF who presents today to [facility] for scheduled RAINER/DCCV with Dr. Barriga. Today he presents in AF with RVR. He denies any chest pain, palpitations, SOB, KILPATRICK, dizziness, light headedness, weakness, LE swelling, syncope, or near syncopal episodes. He denies any bleeding, infections, fevers, rashes, or surgeries in the past 30 days. VSS. He is currently taking torpol-xl 50 mg daily and xarelto 20 mg daily (last dose taken last night). He does note that he snores at night and awakens tired. He has been told by his wife that he has apneic episodes. Will refer to sleep medicine for evaluation for sleep apnea.      The patient has been examined and the H&P has been reviewed:     I concur with the findings and no changes have occurred since H&P was written.      Review of Systems   Constitution: Negative. Negative for fevers/chills, weakness and malaise/fatigue.   HENT: Negative.  Negative for ear pain and tinnitus.    Eyes: Negative for blurred vision.   Cardiovascular: Negative. Negative for chest pain, dyspnea on exertion, leg swelling, near-syncope, palpitations and syncope.   Respiratory: Negative. Negative for shortness of breath.    Endocrine: Negative.  Negative for polyuria.   Hematologic/Lymphatic: Negative for bruise/bleed easily. Negative for significant bleeding.  Skin: Negative.  Negative for rash.   Musculoskeletal: Negative.  Negative for joint pain and muscle weakness.   Gastrointestinal: Negative.  Negative for abdominal pain, hematemesis, and change in bowel habit.   Genitourinary: Negative for frequency or hematuria.   Neurological: Negative. Negative for dizziness.   Psychiatric/Behavioral: Negative. Negative for depression. The patient is not nervous/anxious.       Physical Exam   Constitutional: Oriented to person, place, and time. Appears well-developed and well-nourished.   HENT:   Head: Normocephalic and atraumatic.   Eyes: Conjunctivae, EOM and lids are  normal. No scleral icterus.   Neck: Normal range of motion.   Cardiovascular: Intact distal pulses. Irregular rhythm present with rapid ventricular rate.   Pulses:       Radial pulses are 2+ on the right side, and 2+ on the left side.   Pulmonary/Chest: Effort normal and breath sounds normal. No accessory muscle usage. No tachypnea. No respiratory distress. No wheezes.   Abdominal: Soft. Bowel sounds are normal. No distension. There is no tenderness.   Musculoskeletal: Normal range of motion. No edema. No focal numbness or weakness.  Neurological: Alert and oriented to person, place, and time. Normal muscle tone.   Skin: Skin is warm and dry. No rash noted.   Psychiatric: Normal mood and affect. Behavior is normal.   Nursing note and vitals reviewed.     Labs: Labs from 10/6/22 through today reviewed.     Significant Studies: ECG today reveals AF with RVR at 110 BPM.     Active Hospital Problems    Diagnosis  POA    Persistent atrial fibrillation [I48.19]  Yes      Resolved Hospital Problems   No resolved problems to display.     Plan:  - Planned for RAINER/DCCV  - Anesthesia for sedation.     Prior to procedure, extensive discussion with patient regarding risks and benefits of DCCV, and patient would like to proceed. Dr. Barriga at bedside to answer all questions.  The patient voices understanding and all questions have been answered. No further questions/concerns voiced at this time. Consents signed at clinic visit.    ISAIAS Segura-DAYRON  Cardiology Electrophysiology NP   [facility]    Attending: Mikel Barriga MD

## 2022-10-17 NOTE — PROGRESS NOTES
Removed piv to right hand. Reviewed discharge material with patient and spouse. Verbalized understanding and given copy of discharge paperwork along with ointment for home use.

## 2022-10-17 NOTE — DISCHARGE INSTRUCTIONS
Medications:  -Continue to take your home medications as listed on your medication list after you are discharged.    New Medications:  None     Diet  -You may resume oral intake after you are discharged, as long you have no swallowing difficulties.    Because you have received sedation for this procedure:  -Limit activity for the remainder of the day.  -Do not smoke for at least 6 hours and until you are fully awake and alert.  -Do not drink alcoholic beverage for 24 hours.  -Do not drive for 24 hours.  -Defer important decision making until the following day.     Go to the Emergency Department if you develop:   -Bleeding  -Weakness or numbness  -Visual, gait or speech disturbance  -New chest pain, palpitations, shortness of breath, rapid heart beat, or fainting  -Fever    Follow up:  -You will be sent home wearing a 14-day cardiac monitor.  -Referral to sleep medicine  -Dr. Mikel Barriga  in 1 month.     Any need to reschedule or cancel procedures, or any questions regarding your procedures should be addressed directly with the Arrhythmia Department Nurses at the following phone number: [phone].

## 2022-10-17 NOTE — HPI
Mr. Burk is an 82 y.o. M with a PMHx of HTN, HLD, and newly discovered asymptomatic AF with RVR. He was recently started on BB and Xarelto. He presents today for RAINER/DCCV and place Bardy monitor.     Anticoagulant/antiplatelets: Xarelto 20 mg daily   ECG: AF with RVR     Dysphagia or odynophagia:  No  Liver Disease, esophageal disease, or known varices:  No  Upper GI Bleeding: No  Snoring:  Yes  Sleep Apnea:  No  Prior neck surgery or radiation:  No  History of anesthetic difficulties:  No  Family history of anesthetic difficulties:  No  Last oral intake: yesterday before midnight  Able to move neck in all directions:  Yes  Platelet count: 207k  INR: 1.2

## 2022-10-17 NOTE — PROGRESS NOTES
Tolerated clear liquids. Able to ambulate and void without assistance. Wife at bedside. Bardy monitor being placed at this time along with instructions from device clinic employee.

## 2022-10-18 NOTE — ANESTHESIA POSTPROCEDURE EVALUATION
Anesthesia Post Evaluation    Patient: Oliver Burk Jr.    Procedure(s) Performed: Procedure(s) (LRB):  CARDIOVERSION (N/A)  Transesophageal echo (RAINER) intra-procedure log documentation (N/A)    Final Anesthesia Type: general      Patient location during evaluation: PACU  Patient participation: Yes- Able to Participate  Level of consciousness: awake and alert  Post-procedure vital signs: reviewed and stable  Pain management: adequate  Airway patency: patent    PONV status at discharge: No PONV  Anesthetic complications: no      Cardiovascular status: blood pressure returned to baseline  Respiratory status: unassisted  Hydration status: euvolemic  Follow-up not needed.          Vitals Value Taken Time   /77 10/17/22 1032   Temp 36.2 °C (97.2 °F) 10/17/22 0940   Pulse 85 10/17/22 1101   Resp 30 10/17/22 1101   SpO2 94 % 10/17/22 1045   Vitals shown include unvalidated device data.      No case tracking events are documented in the log.      Pain/Breonna Score: No data recorded

## 2022-10-21 ENCOUNTER — TELEPHONE (OUTPATIENT)
Dept: ELECTROPHYSIOLOGY | Facility: CLINIC | Age: 82
End: 2022-10-21
Payer: MEDICARE

## 2022-10-25 DIAGNOSIS — I48.91 ATRIAL FIBRILLATION BY ELECTROCARDIOGRAM: Primary | ICD-10-CM

## 2022-11-18 ENCOUNTER — TELEPHONE (OUTPATIENT)
Dept: PULMONOLOGY | Facility: CLINIC | Age: 82
End: 2022-11-18
Payer: MEDICARE

## 2022-11-18 RX ORDER — ALPRAZOLAM 0.25 MG/1
TABLET ORAL
Qty: 90 TABLET | Refills: 0 | Status: SHIPPED | OUTPATIENT
Start: 2022-11-18 | End: 2023-12-06 | Stop reason: SDUPTHER

## 2022-11-23 ENCOUNTER — TELEPHONE (OUTPATIENT)
Dept: ELECTROPHYSIOLOGY | Facility: CLINIC | Age: 82
End: 2022-11-23
Payer: MEDICARE

## 2022-11-23 NOTE — TELEPHONE ENCOUNTER
Spoke with pt to schedule bookout appointment. Pt was not in his office to look at his schedule. Pt will give us a call when he gets home

## 2022-12-08 DIAGNOSIS — R53.83 FATIGUE, UNSPECIFIED TYPE: Primary | ICD-10-CM

## 2022-12-08 DIAGNOSIS — G47.30 SLEEP APNEA IN ADULT: ICD-10-CM

## 2022-12-20 ENCOUNTER — OFFICE VISIT (OUTPATIENT)
Dept: SLEEP MEDICINE | Facility: CLINIC | Age: 82
End: 2022-12-20
Payer: MEDICARE

## 2022-12-20 VITALS
OXYGEN SATURATION: 97 % | DIASTOLIC BLOOD PRESSURE: 82 MMHG | HEART RATE: 83 BPM | BODY MASS INDEX: 25.44 KG/M2 | WEIGHT: 187.81 LBS | SYSTOLIC BLOOD PRESSURE: 138 MMHG | HEIGHT: 72 IN

## 2022-12-20 DIAGNOSIS — I48.0 PAROXYSMAL ATRIAL FIBRILLATION: ICD-10-CM

## 2022-12-20 DIAGNOSIS — R06.83 SNORING: Primary | ICD-10-CM

## 2022-12-20 DIAGNOSIS — G47.19 EXCESSIVE DAYTIME SLEEPINESS: ICD-10-CM

## 2022-12-20 PROCEDURE — 99999 PR PBB SHADOW E&M-EST. PATIENT-LVL III: CPT | Mod: PBBFAC,,, | Performed by: INTERNAL MEDICINE

## 2022-12-20 PROCEDURE — 99999 PR PBB SHADOW E&M-EST. PATIENT-LVL III: ICD-10-PCS | Mod: PBBFAC,,, | Performed by: INTERNAL MEDICINE

## 2022-12-20 PROCEDURE — 99213 OFFICE O/P EST LOW 20 MIN: CPT | Mod: PBBFAC | Performed by: INTERNAL MEDICINE

## 2022-12-20 PROCEDURE — 99214 PR OFFICE/OUTPT VISIT, EST, LEVL IV, 30-39 MIN: ICD-10-PCS | Mod: S$PBB,,, | Performed by: INTERNAL MEDICINE

## 2022-12-20 PROCEDURE — 99214 OFFICE O/P EST MOD 30 MIN: CPT | Mod: S$PBB,,, | Performed by: INTERNAL MEDICINE

## 2022-12-20 RX ORDER — NAPROXEN 500 MG/1
500 TABLET ORAL 2 TIMES DAILY PRN
COMMUNITY
Start: 2022-09-01 | End: 2023-02-23 | Stop reason: CLARIF

## 2022-12-20 RX ORDER — LISINOPRIL 10 MG/1
TABLET ORAL
COMMUNITY
Start: 2022-11-29 | End: 2022-12-20

## 2022-12-20 NOTE — PROGRESS NOTES
Subjective:   Patient ID: Oliver Burk Jr. is a 82 y.o. male    Chief Complaint: No chief complaint on file.      Referred by Dr. Richar Patterson    HPI  Oliver Burk Jr. is a 82 y.o. male who was referred by Dr. Patterson for a sleep evaluation for possible SDB. Relevant medical history includes atrial fibrillation, recently diagnosed and hypertension.    The patient reports witnessed snoring noticed by his wife. There is witnessed apneas. He has not awoken from a snore and has no gasping for air. When he wakes up from sleep, he  feels refreshed. There has not been any change in weight.    He wakes up well rested and has no diurnal symptoms.  He works out regularly, 6 days a week.    Sometimes ruminates at times. He does not take it regularly.    Symptoms of restless legs syndrome are not reported.      Prior sleep evaluation: none    Sleep medication taken: Xanax and Melatonin as needed.    Other sleep remedies: none    Patient does  work as a .    Sleep summary during the workdays per patient report:  Bedtime: 9-10:00 PM  Sleep Latency: a few min  # Awakenings: 2-4x  WASO (wakefulness after sleep onset) a few min  Risetime: 5:30 AM    Sleep summary during days off per patient report:  Bedtime: 9-10:00 PM  Sleep Latency: a few min  # Awakenings: 2-4x  WASO (wakefulness after sleep onset) a few min  Risetime: 5:30 AM    Bed partner is  present.    CONTRIBUTING and/or CONFOUNDING FACTORS:    Nocturnal pain:   n    Nocturia >2/night:   y  Heartburn/GI pain:   n  Environment:    n  Bed partner noise/movements : n      Patient provided ESS:    [place] Sleepiness Scale TOTAL   (validated sleepiness questionnaire with a higher score indicating greater sleepiness; range 0-24)  No flowsheet data found.              STOP BANG questionnaire:    Snoring present: y  Tiredness present:y  Obstruction (apneas/choking episodes): y  Pressure (HTN): y    BMI greater than 35 kg/m2: n  Age greater than 50 years old:  y  Neck circumference > than 17 inches if male or > than 16 inches if female : n  Gender being male: y    Total STOP BANG score = 6/8  Low risk ROMEO: 0-2, Intermediate risk ROMEO: 3-4, High risk ROMEO: 5+      Most Recent Vital Signs:    The patient's body mass index is 25.47 kg/m².    Wt Readings from Last 5 Encounters:   12/20/22 85.2 kg (187 lb 13.3 oz)   10/17/22 83.9 kg (185 lb)   10/17/22 83.9 kg (185 lb)   10/07/22 85.5 kg (188 lb 7.9 oz)   10/06/22 83.9 kg (185 lb)     BMI Readings from Last 5 Encounters:   12/20/22 25.47 kg/m²   10/17/22 25.09 kg/m²   10/17/22 25.09 kg/m²   10/07/22 25.56 kg/m²   10/06/22 25.09 kg/m²     Pulse Readings from Last 3 Encounters:   12/20/22 83   10/17/22 80   10/07/22 70         Current Outpatient Medications:     ALPRAZolam (XANAX) 0.25 MG tablet, Take one tablet hs for sleep, Disp: 90 tablet, Rfl: 0    diclofenac sodium (VOLTAREN) 1 % Gel, Apply 1 Tube topically 2 (two) times daily as needed., Disp: , Rfl:     gabapentin (NEURONTIN) 300 MG capsule, 1 capsule once daily., Disp: , Rfl:     gabapentin (NEURONTIN) 300 MG capsule, Take 1 capsule (300 mg total) by mouth 3 (three) times daily., Disp: 90 capsule, Rfl: 11    ibuprofen 200 mg Cap, Take by mouth., Disp: , Rfl:     lisinopriL 10 MG tablet, , Disp: , Rfl:     meloxicam (MOBIC) 15 MG tablet, 1 tablet once daily., Disp: , Rfl: 3    metoprolol succinate (TOPROL-XL) 50 MG 24 hr tablet, Take 1 tablet (50 mg total) by mouth once daily., Disp: 90 tablet, Rfl: 3    naproxen (NAPROSYN) 500 MG tablet, Take 500 mg by mouth 2 (two) times daily as needed., Disp: , Rfl:     naproxen sodium (ANAPROX) 220 MG tablet, Take 220 mg by mouth as needed., Disp: , Rfl:     rivaroxaban (XARELTO) 20 mg Tab, Take 1 tablet (20 mg total) by mouth daily with dinner or evening meal., Disp: 90 tablet, Rfl: 3    sildenafil (VIAGRA) 100 MG tablet, Take 1 tablet by mouth 1 hour prior to intercourse. Take on empty stomach, Disp: 10 tablet, Rfl: 11    simvastatin  (ZOCOR) 20 MG tablet, TAKE ONE TABLET BY MOUTH ONCE DAILY, Disp: 30 tablet, Rfl: 12    tamsulosin (FLOMAX) 0.4 mg Cap, TAKE ONE CAPSULE BY MOUTH ONCE DAILY AT BEDTIME, Disp: 30 capsule, Rfl: 12    Objective:   Vitals reviewed   Physical Exam  Constitutional:       Appearance: Normal appearance.   HENT:      Mouth/Throat:      Comments: Mallampati III  Cardiovascular:      Comments: Irregular irregular, 153 HR, repeat 83  Pulmonary:      Effort: Pulmonary effort is normal.      Breath sounds: Normal breath sounds.   Neurological:      General: No focal deficit present.      Mental Status: He is alert.       Assessment:     Problem List Items Addressed This Visit    None  Visit Diagnoses       Snoring    -  Primary    Relevant Orders    Polysomnogram (CPAP will be added if patient meets diagnostic criteria.)    Excessive daytime sleepiness        Relevant Orders    Polysomnogram (CPAP will be added if patient meets diagnostic criteria.)    Paroxysmal atrial fibrillation        Relevant Orders    Polysomnogram (CPAP will be added if patient meets diagnostic criteria.)              Plan:       1. Snoring  2. Excessive daytime sleepiness  3. Paroxysmal atrial fibrillation    Could likely have underlying sleep apnea given witnessed snoring and apneas. He does have new onset atrial fibrillation as well without clear etiology.     Patient has been informed about the pathophysiology and prognosis associated with ROMEO and CSA and has been advised to undergo a baseline Polysomnography (PSG) study for further evaluation of his sleep disorder breathing.    Patient understood and agreed to undergo for a PSG study.     All patient's questions and concerns regarding Sleep Disorder Breathing were addressed during this visit.    I will follow up the patient closely. Will follow-up patient with results of PSG

## 2022-12-27 ENCOUNTER — TELEPHONE (OUTPATIENT)
Dept: SLEEP MEDICINE | Facility: OTHER | Age: 82
End: 2022-12-27
Payer: MEDICARE

## 2022-12-30 ENCOUNTER — TELEPHONE (OUTPATIENT)
Dept: SLEEP MEDICINE | Facility: OTHER | Age: 82
End: 2022-12-30
Payer: MEDICARE

## 2023-01-04 ENCOUNTER — HOSPITAL ENCOUNTER (OUTPATIENT)
Dept: SLEEP MEDICINE | Facility: OTHER | Age: 83
Discharge: HOME | End: 2023-01-04
Attending: INTERNAL MEDICINE
Payer: MEDICARE

## 2023-01-04 ENCOUNTER — TELEPHONE (OUTPATIENT)
Dept: SLEEP MEDICINE | Facility: OTHER | Age: 83
End: 2023-01-04
Payer: MEDICARE

## 2023-01-04 DIAGNOSIS — I48.0 PAROXYSMAL ATRIAL FIBRILLATION: ICD-10-CM

## 2023-01-04 DIAGNOSIS — G47.19 EXCESSIVE DAYTIME SLEEPINESS: ICD-10-CM

## 2023-01-04 DIAGNOSIS — G47.33 OSA (OBSTRUCTIVE SLEEP APNEA): Primary | ICD-10-CM

## 2023-01-04 DIAGNOSIS — R06.83 SNORING: ICD-10-CM

## 2023-01-04 PROCEDURE — 95810 POLYSOM 6/> YRS 4/> PARAM: CPT

## 2023-01-05 PROBLEM — R06.83 SNORING: Status: ACTIVE | Noted: 2023-01-05

## 2023-01-05 NOTE — PROGRESS NOTES
A baseline study was performed on [facility]. The following was explained to the pt prior to start of the sleep study: 1.) Bed time/wake time;2) the reasons tech will enter the room during the night; 3.) The possibility of CPAP trial same night. Pt was asked call out for assistance if she needs to get out of the bed for the restroom. An after-visit summary was handed to the pt in the morning that explains what is expected such as the result timeframe.

## 2023-01-11 PROBLEM — I48.0 PAROXYSMAL ATRIAL FIBRILLATION: Status: ACTIVE | Noted: 2023-01-11

## 2023-01-11 PROBLEM — G47.33 OSA (OBSTRUCTIVE SLEEP APNEA): Status: ACTIVE | Noted: 2023-01-11

## 2023-01-11 PROCEDURE — 95810 PR POLYSOMNOGRAPHY, 4 OR MORE: ICD-10-PCS | Mod: 26,,, | Performed by: INTERNAL MEDICINE

## 2023-01-11 PROCEDURE — 95810 POLYSOM 6/> YRS 4/> PARAM: CPT | Mod: 26,,, | Performed by: INTERNAL MEDICINE

## 2023-01-20 ENCOUNTER — OFFICE VISIT (OUTPATIENT)
Dept: ELECTROPHYSIOLOGY | Facility: CLINIC | Age: 83
End: 2023-01-20
Payer: MEDICARE

## 2023-01-20 ENCOUNTER — HOSPITAL ENCOUNTER (OUTPATIENT)
Dept: CARDIOLOGY | Facility: CLINIC | Age: 83
Discharge: HOME | End: 2023-01-20
Payer: MEDICARE

## 2023-01-20 VITALS
HEIGHT: 72 IN | HEART RATE: 114 BPM | BODY MASS INDEX: 25.95 KG/M2 | WEIGHT: 191.56 LBS | DIASTOLIC BLOOD PRESSURE: 80 MMHG | SYSTOLIC BLOOD PRESSURE: 125 MMHG

## 2023-01-20 DIAGNOSIS — I48.91 ATRIAL FIBRILLATION BY ELECTROCARDIOGRAM: ICD-10-CM

## 2023-01-20 DIAGNOSIS — I48.19 PERSISTENT ATRIAL FIBRILLATION: Primary | ICD-10-CM

## 2023-01-20 DIAGNOSIS — I10 HYPERTENSION, UNSPECIFIED TYPE: ICD-10-CM

## 2023-01-20 DIAGNOSIS — G47.33 OSA (OBSTRUCTIVE SLEEP APNEA): ICD-10-CM

## 2023-01-20 PROCEDURE — 99999 PR PBB SHADOW E&M-EST. PATIENT-LVL III: ICD-10-PCS | Mod: PBBFAC,,, | Performed by: INTERNAL MEDICINE

## 2023-01-20 PROCEDURE — 99214 OFFICE O/P EST MOD 30 MIN: CPT | Mod: S$PBB,,, | Performed by: INTERNAL MEDICINE

## 2023-01-20 PROCEDURE — 99213 OFFICE O/P EST LOW 20 MIN: CPT | Mod: PBBFAC | Performed by: INTERNAL MEDICINE

## 2023-01-20 PROCEDURE — 93005 ELECTROCARDIOGRAM TRACING: CPT | Mod: PBBFAC | Performed by: INTERNAL MEDICINE

## 2023-01-20 PROCEDURE — 93010 RHYTHM STRIP: ICD-10-PCS | Mod: S$PBB,,, | Performed by: INTERNAL MEDICINE

## 2023-01-20 PROCEDURE — 99214 PR OFFICE/OUTPT VISIT, EST, LEVL IV, 30-39 MIN: ICD-10-PCS | Mod: S$PBB,,, | Performed by: INTERNAL MEDICINE

## 2023-01-20 PROCEDURE — 93010 ELECTROCARDIOGRAM REPORT: CPT | Mod: S$PBB,,, | Performed by: INTERNAL MEDICINE

## 2023-01-20 PROCEDURE — 99999 PR PBB SHADOW E&M-EST. PATIENT-LVL III: CPT | Mod: PBBFAC,,, | Performed by: INTERNAL MEDICINE

## 2023-01-20 RX ORDER — METOPROLOL SUCCINATE 100 MG/1
100 TABLET, EXTENDED RELEASE ORAL DAILY
Qty: 90 TABLET | Refills: 3 | Status: SHIPPED | OUTPATIENT
Start: 2023-01-20 | End: 2023-07-24

## 2023-01-20 NOTE — PROGRESS NOTES
Subjective:    Patient ID:  Oliver Burk Jr. is a 82 y.o. male who presents for evaluation of AF    HPI  82 y.o. M  referred by his friend Dr QUINTIN B[place]  HTN on meds  HL on meds  BPH  spinal stenosis c/b bilat foot drop    Newly discovered asymptomatic AF with RVR.  Works out without a problem.    7/2020 ECG: SR  TSH wnl    We did RAINER/DCCV 10/7/22. He noticed no difference between AF and NSR. Per [employer] monitor, NSR lasted about 2 days.   He's still working out 5-6x/week without issues.     My interpretation of today's ECG is AF with     Review of Systems   Constitutional: Negative. Negative for malaise/fatigue.   HENT: Negative.  Negative for ear pain and tinnitus.    Eyes:  Negative for blurred vision.   Cardiovascular: Negative.  Negative for chest pain, dyspnea on exertion, near-syncope, palpitations and syncope.   Respiratory: Negative.  Negative for shortness of breath.    Endocrine: Negative.  Negative for polyuria.   Hematologic/Lymphatic: Does not bruise/bleed easily.   Skin: Negative.  Negative for rash.   Musculoskeletal: Negative.  Negative for joint pain and muscle weakness.   Gastrointestinal: Negative.  Negative for abdominal pain and change in bowel habit.   Genitourinary:  Negative for frequency.   Neurological: Negative.  Negative for dizziness and weakness. Focal weakness: bilateral foot drop.  Psychiatric/Behavioral: Negative.  Negative for depression. The patient is not nervous/anxious.    Allergic/Immunologic: Negative for environmental allergies.      Objective:    Physical Exam  Vitals and nursing note reviewed.   Constitutional:       Appearance: He is well-developed.   HENT:      Head: Normocephalic and atraumatic.   Eyes:      General: Lids are normal. No scleral icterus.     Conjunctiva/sclera: Conjunctivae normal.   Neck:      Thyroid: No thyromegaly.      Vascular: No JVD.      Trachea: No tracheal deviation.   Cardiovascular:      Rate and Rhythm: Tachycardia  present. Rhythm irregular.      Pulses:           Radial pulses are 2+ on the right side and 2+ on the left side.   Pulmonary:      Effort: Pulmonary effort is normal. No tachypnea, accessory muscle usage or respiratory distress.      Breath sounds: Normal breath sounds. No wheezing.   Abdominal:      General: There is no distension.   Musculoskeletal:         General: Normal range of motion.      Cervical back: Normal range of motion.   Skin:     General: Skin is warm and dry.   Neurological:      Mental Status: He is alert and oriented to person, place, and time.      Motor: No abnormal muscle tone.      Deep Tendon Reflexes: Reflexes are normal and symmetric.   Psychiatric:         Behavior: Behavior normal.         Assessment:       1. Persistent atrial fibrillation    2. Hypertension, unspecified type    3. ROMEO (obstructive sleep apnea)         Plan:       No change in sx between NSR and AF.   Will adopt a rate-control strategy.    Increase BB for rate control.  Xarelto for primary CVA prophylaxis.

## 2023-01-24 ENCOUNTER — TELEPHONE (OUTPATIENT)
Dept: SLEEP MEDICINE | Facility: CLINIC | Age: 83
End: 2023-01-24
Payer: MEDICARE

## 2023-01-24 ENCOUNTER — TELEPHONE (OUTPATIENT)
Dept: ELECTROPHYSIOLOGY | Facility: CLINIC | Age: 83
End: 2023-01-24
Payer: MEDICARE

## 2023-01-24 DIAGNOSIS — G47.33 OSA (OBSTRUCTIVE SLEEP APNEA): Primary | ICD-10-CM

## 2023-02-02 ENCOUNTER — OFFICE VISIT (OUTPATIENT)
Dept: SURGERY | Facility: CLINIC | Age: 83
End: 2023-02-02
Payer: MEDICARE

## 2023-02-02 ENCOUNTER — TELEPHONE (OUTPATIENT)
Dept: PULMONOLOGY | Facility: CLINIC | Age: 83
End: 2023-02-02
Payer: MEDICARE

## 2023-02-02 VITALS
HEART RATE: 76 BPM | DIASTOLIC BLOOD PRESSURE: 88 MMHG | HEIGHT: 72 IN | WEIGHT: 188.81 LBS | BODY MASS INDEX: 25.57 KG/M2 | SYSTOLIC BLOOD PRESSURE: 126 MMHG

## 2023-02-02 DIAGNOSIS — K40.00 NON-RECURRENT BILATERAL INGUINAL HERNIA WITH OBSTRUCTION WITHOUT GANGRENE: Primary | ICD-10-CM

## 2023-02-02 DIAGNOSIS — K40.00 NON-RECURRENT BILATERAL INGUINAL HERNIA WITH OBSTRUCTION WITHOUT GANGRENE: ICD-10-CM

## 2023-02-02 PROCEDURE — 99999 PR PBB SHADOW E&M-EST. PATIENT-LVL III: ICD-10-PCS | Mod: PBBFAC,,, | Performed by: SURGERY

## 2023-02-02 PROCEDURE — 99203 PR OFFICE/OUTPT VISIT, NEW, LEVL III, 30-44 MIN: ICD-10-PCS | Mod: S$PBB,,, | Performed by: SURGERY

## 2023-02-02 PROCEDURE — 99213 OFFICE O/P EST LOW 20 MIN: CPT | Mod: PBBFAC | Performed by: SURGERY

## 2023-02-02 PROCEDURE — 99203 OFFICE O/P NEW LOW 30 MIN: CPT | Mod: S$PBB,,, | Performed by: SURGERY

## 2023-02-02 PROCEDURE — 99999 PR PBB SHADOW E&M-EST. PATIENT-LVL III: CPT | Mod: PBBFAC,,, | Performed by: SURGERY

## 2023-02-02 NOTE — PROGRESS NOTES
I have seen the patient, reviewed the Resident's history and physical, assessment and plan. I have personally interviewed and examined the patient at bedside and: agree with the findings.     Symptomatic lih for open repair with mesh.  Outpatient, pcp clearance.

## 2023-02-02 NOTE — H&P (VIEW-ONLY)
History & Physical    SUBJECTIVE:     History of Present Illness:  Patient is a 82 y.o. male presents with inguinal hernia that he's had for about 40 years . He denies pain, nausea, vomiting associated with the hernia. He works out regularly (3-4 tmes)  a week0 and he notices that the hernia gets bigger, when he does situps or uses the machines. Denies difficult urinating, constipation. Has not yet tried a hernia belt. When he does get occassional pain he rates on a scale of 3/10. Pain can occur everydau or be intermittent depending on what he is doing that day, Walking a lot vs, sitting. The hernia is not causing him acute pain, but he does have to reduce it multiple times a day, which he finds annoying. He would like to have it repaired        No chief complaint on file.      Review of patient's allergies indicates:  No Known Allergies    Current Outpatient Medications   Medication Sig Dispense Refill    ALPRAZolam (XANAX) 0.25 MG tablet Take one tablet hs for sleep 90 tablet 0    diclofenac sodium (VOLTAREN) 1 % Gel Apply 1 Tube topically 2 (two) times daily as needed.      gabapentin (NEURONTIN) 300 MG capsule 1 capsule once daily.      ibuprofen 200 mg Cap Take by mouth. PRN      meloxicam (MOBIC) 15 MG tablet 1 tablet once daily.  3    metoprolol succinate (TOPROL-XL) 100 MG 24 hr tablet Take 1 tablet (100 mg total) by mouth once daily. 90 tablet 3    naproxen (NAPROSYN) 500 MG tablet Take 500 mg by mouth 2 (two) times daily as needed.      naproxen sodium (ANAPROX) 220 MG tablet Take 220 mg by mouth as needed.      rivaroxaban (XARELTO) 20 mg Tab Take 1 tablet (20 mg total) by mouth daily with dinner or evening meal. 90 tablet 3    sildenafil (VIAGRA) 100 MG tablet Take 1 tablet by mouth 1 hour prior to intercourse. Take on empty stomach 10 tablet 11    simvastatin (ZOCOR) 20 MG tablet TAKE ONE TABLET BY MOUTH ONCE DAILY 30 tablet 12    tamsulosin (FLOMAX) 0.4 mg Cap TAKE ONE CAPSULE BY MOUTH ONCE DAILY AT  BEDTIME 90 capsule 3     No current facility-administered medications for this visit.       Past Medical History:   Diagnosis Date    Arthritis     Atrial fibrillation     Basal cell carcinoma     Cancer     prostate    Hyperlipidemia     Hypertension     on medication     Past Surgical History:   Procedure Laterality Date    APPENDECTOMY      CATARACT EXTRACTION Right 01/14/2019    Dr. Mendez (symfony lens)    CATARACT EXTRACTION W/  INTRAOCULAR LENS IMPLANT Right 01/14/2019    Procedure: EXTRACTION, CATARACT, WITH IOL INSERTION;  Surgeon: Mu Mendez MD;  Location: [facility] OR;  Service: Ophthalmology;  Laterality: Right;  Laser    CATARACT EXTRACTION W/  INTRAOCULAR LENS IMPLANT Left 01/28/2019    Procedure: EXTRACTION, CATARACT, WITH IOL INSERTION;  Surgeon: Mu Mendez MD;  Location: [facility] OR;  Service: Ophthalmology;  Laterality: Left;  Laser    EYE SURGERY      LUMBAR LAMINECTOMY  2004    PROSTATE SURGERY  2010    TREATMENT OF CARDIAC ARRHYTHMIA N/A 10/17/2022    Procedure: CARDIOVERSION;  Surgeon: ABDULLAHI Spangler MD;  Location: [facility] EP LAB;  Service: Cardiology;  Laterality: N/A;  afib, RAINER, DCCV, anes, DM, 3 Prep     Family History   Problem Relation Age of Onset    Emphysema Mother     Heart failure Father     Glaucoma Neg Hx     Blindness Neg Hx     Cataracts Neg Hx     Cancer Neg Hx     Diabetes Neg Hx      Social History     Tobacco Use    Smoking status: Never    Smokeless tobacco: Never   Substance Use Topics    Alcohol use: Yes     Alcohol/week: 12.0 standard drinks     Types: 5 Shots of liquor, 7 Standard drinks or equivalent per week     Comment: 1 drink per night    Drug use: No        Review of Systems:  Review of Systems    OBJECTIVE:     Vital Signs (Most Recent)  Pulse: 76 (02/02/23 1012)  BP: 126/88 (02/02/23 1012)  6' (1.829 m)  85.6 kg (188 lb 13.2 oz)     Physical Exam:  Physical Exam    Laboratory  Relevant labs reviewed    Diagnostic Results:  Relevant imaging  reviewed    ASSESSMENT/PLAN:     Oliver Burk Jr. Is a 82 y.o. male with large reducible left inguinal hernia on exam. He would like to have it repaired as it caused him discomfort and needs to reduce it multiple times a day.    PLAN:         - Schedule for open inguinal hernia repair   - Consent obtained in clinic  - Risks and benefits explained to patient, verbalizes understanding  - All questions answered. Patient understand no heavy lifting for 6 weeks after surgery. Instructed to call clinic if any other questions arise.

## 2023-02-02 NOTE — PROGRESS NOTES
History & Physical    SUBJECTIVE:     History of Present Illness:  Patient is a 82 y.o. male presents with inguinal hernia that he's had for about 40 years . He denies pain, nausea, vomiting associated with the hernia. He works out regularly (3-4 tmes)  a week0 and he notices that the hernia gets bigger, when he does situps or uses the machines. Denies difficult urinating, constipation. Has not yet tried a hernia belt. When he does get occassional pain he rates on a scale of 3/10. Pain can occur everydau or be intermittent depending on what he is doing that day, Walking a lot vs, sitting. The hernia is not causing him acute pain, but he does have to reduce it multiple times a day, which he finds annoying. He would like to have it repaired        No chief complaint on file.      Review of patient's allergies indicates:  No Known Allergies    Current Outpatient Medications   Medication Sig Dispense Refill    ALPRAZolam (XANAX) 0.25 MG tablet Take one tablet hs for sleep 90 tablet 0    diclofenac sodium (VOLTAREN) 1 % Gel Apply 1 Tube topically 2 (two) times daily as needed.      gabapentin (NEURONTIN) 300 MG capsule 1 capsule once daily.      ibuprofen 200 mg Cap Take by mouth. PRN      meloxicam (MOBIC) 15 MG tablet 1 tablet once daily.  3    metoprolol succinate (TOPROL-XL) 100 MG 24 hr tablet Take 1 tablet (100 mg total) by mouth once daily. 90 tablet 3    naproxen (NAPROSYN) 500 MG tablet Take 500 mg by mouth 2 (two) times daily as needed.      naproxen sodium (ANAPROX) 220 MG tablet Take 220 mg by mouth as needed.      rivaroxaban (XARELTO) 20 mg Tab Take 1 tablet (20 mg total) by mouth daily with dinner or evening meal. 90 tablet 3    sildenafil (VIAGRA) 100 MG tablet Take 1 tablet by mouth 1 hour prior to intercourse. Take on empty stomach 10 tablet 11    simvastatin (ZOCOR) 20 MG tablet TAKE ONE TABLET BY MOUTH ONCE DAILY 30 tablet 12    tamsulosin (FLOMAX) 0.4 mg Cap TAKE ONE CAPSULE BY MOUTH ONCE DAILY AT  BEDTIME 90 capsule 3     No current facility-administered medications for this visit.       Past Medical History:   Diagnosis Date    Arthritis     Atrial fibrillation     Basal cell carcinoma     Cancer     prostate    Hyperlipidemia     Hypertension     on medication     Past Surgical History:   Procedure Laterality Date    APPENDECTOMY      CATARACT EXTRACTION Right 01/14/2019    Dr. Mendez (symfony lens)    CATARACT EXTRACTION W/  INTRAOCULAR LENS IMPLANT Right 01/14/2019    Procedure: EXTRACTION, CATARACT, WITH IOL INSERTION;  Surgeon: Mu Mendez MD;  Location: [facility] OR;  Service: Ophthalmology;  Laterality: Right;  Laser    CATARACT EXTRACTION W/  INTRAOCULAR LENS IMPLANT Left 01/28/2019    Procedure: EXTRACTION, CATARACT, WITH IOL INSERTION;  Surgeon: Mu Mendez MD;  Location: [facility] OR;  Service: Ophthalmology;  Laterality: Left;  Laser    EYE SURGERY      LUMBAR LAMINECTOMY  2004    PROSTATE SURGERY  2010    TREATMENT OF CARDIAC ARRHYTHMIA N/A 10/17/2022    Procedure: CARDIOVERSION;  Surgeon: ABDULLAHI Spangler MD;  Location: [facility] EP LAB;  Service: Cardiology;  Laterality: N/A;  afib, RAINER, DCCV, anes, DM, 3 Prep     Family History   Problem Relation Age of Onset    Emphysema Mother     Heart failure Father     Glaucoma Neg Hx     Blindness Neg Hx     Cataracts Neg Hx     Cancer Neg Hx     Diabetes Neg Hx      Social History     Tobacco Use    Smoking status: Never    Smokeless tobacco: Never   Substance Use Topics    Alcohol use: Yes     Alcohol/week: 12.0 standard drinks     Types: 5 Shots of liquor, 7 Standard drinks or equivalent per week     Comment: 1 drink per night    Drug use: No        Review of Systems:  Review of Systems    OBJECTIVE:     Vital Signs (Most Recent)  Pulse: 76 (02/02/23 1012)  BP: 126/88 (02/02/23 1012)  6' (1.829 m)  85.6 kg (188 lb 13.2 oz)     Physical Exam:  Physical Exam    Laboratory  Relevant labs reviewed    Diagnostic Results:  Relevant imaging  reviewed    ASSESSMENT/PLAN:     Oliver Burk Jr. Is a 82 y.o. male with large reducible left inguinal hernia on exam. He would like to have it repaired as it caused him discomfort and needs to reduce it multiple times a day.    PLAN:         - Schedule for open inguinal hernia repair   - Consent obtained in clinic  - Risks and benefits explained to patient, verbalizes understanding  - All questions answered. Patient understand no heavy lifting for 6 weeks after surgery. Instructed to call clinic if any other questions arise.

## 2023-02-07 ENCOUNTER — TELEPHONE (OUTPATIENT)
Dept: SURGERY | Facility: CLINIC | Age: 83
End: 2023-02-07
Payer: MEDICARE

## 2023-02-07 ENCOUNTER — TELEPHONE (OUTPATIENT)
Dept: PULMONOLOGY | Facility: CLINIC | Age: 83
End: 2023-02-07
Payer: MEDICARE

## 2023-02-07 DIAGNOSIS — K40.00 NON-RECURRENT BILATERAL INGUINAL HERNIA WITH OBSTRUCTION WITHOUT GANGRENE: Primary | ICD-10-CM

## 2023-02-07 NOTE — TELEPHONE ENCOUNTER
----- Message from Lamberto Bedoya MA sent at 2/7/2023 11:06 AM CST -----  Regarding: SURGICAL CLEARANCE  Good morning , Dr. Stewart is going to be repairing an Inguinal hernia on 2/24/23 on Mr. Burk. He's asking for surgical clearance prior to procedure. Thanks in advance for any help.

## 2023-02-07 NOTE — TELEPHONE ENCOUNTER
"Spoke to patient about his PCP , he states that Dr. Patterson has been his "Personal" , doctor for over 40 years and he'll be the one to clear him for surgery. Message sent 2/7/23 to his staff.   "

## 2023-02-14 ENCOUNTER — LAB VISIT (OUTPATIENT)
Dept: LAB | Facility: HOSPITAL | Age: 83
End: 2023-02-14
Attending: INTERNAL MEDICINE
Payer: MEDICARE

## 2023-02-14 DIAGNOSIS — I48.91 ATRIAL FIBRILLATION BY ELECTROCARDIOGRAM: ICD-10-CM

## 2023-02-14 DIAGNOSIS — K40.00 NON-RECURRENT BILATERAL INGUINAL HERNIA WITH OBSTRUCTION WITHOUT GANGRENE: ICD-10-CM

## 2023-02-14 LAB
ANION GAP SERPL CALC-SCNC: 9 MMOL/L (ref 8–16)
BASOPHILS # BLD AUTO: 0.07 K/UL (ref 0–0.2)
BASOPHILS NFR BLD: 0.9 % (ref 0–1.9)
BUN SERPL-MCNC: 12 MG/DL (ref 8–23)
CALCIUM SERPL-MCNC: 10.5 MG/DL (ref 8.7–10.5)
CHLORIDE SERPL-SCNC: 106 MMOL/L (ref 95–110)
CO2 SERPL-SCNC: 24 MMOL/L (ref 23–29)
CREAT SERPL-MCNC: 0.9 MG/DL (ref 0.5–1.4)
DIFFERENTIAL METHOD: ABNORMAL
EOSINOPHIL # BLD AUTO: 0.2 K/UL (ref 0–0.5)
EOSINOPHIL NFR BLD: 2.8 % (ref 0–8)
ERYTHROCYTE [DISTWIDTH] IN BLOOD BY AUTOMATED COUNT: 13.8 % (ref 11.5–14.5)
EST. GFR  (NO RACE VARIABLE): >60 ML/MIN/1.73 M^2
GLUCOSE SERPL-MCNC: 100 MG/DL (ref 70–110)
HCT VFR BLD AUTO: 47.2 % (ref 40–54)
HGB BLD-MCNC: 14.4 G/DL (ref 14–18)
IMM GRANULOCYTES # BLD AUTO: 0.03 K/UL (ref 0–0.04)
IMM GRANULOCYTES NFR BLD AUTO: 0.4 % (ref 0–0.5)
LYMPHOCYTES # BLD AUTO: 2.2 K/UL (ref 1–4.8)
LYMPHOCYTES NFR BLD: 29.8 % (ref 18–48)
MCH RBC QN AUTO: 30.7 PG (ref 27–31)
MCHC RBC AUTO-ENTMCNC: 30.5 G/DL (ref 32–36)
MCV RBC AUTO: 101 FL (ref 82–98)
MONOCYTES # BLD AUTO: 0.9 K/UL (ref 0.3–1)
MONOCYTES NFR BLD: 12.3 % (ref 4–15)
NEUTROPHILS # BLD AUTO: 4 K/UL (ref 1.8–7.7)
NEUTROPHILS NFR BLD: 53.8 % (ref 38–73)
NRBC BLD-RTO: 0 /100 WBC
PLATELET # BLD AUTO: 166 K/UL (ref 150–450)
PMV BLD AUTO: 11.1 FL (ref 9.2–12.9)
POTASSIUM SERPL-SCNC: 4.8 MMOL/L (ref 3.5–5.1)
RBC # BLD AUTO: 4.69 M/UL (ref 4.6–6.2)
SODIUM SERPL-SCNC: 139 MMOL/L (ref 136–145)
TSH SERPL DL<=0.005 MIU/L-ACNC: 1.27 UIU/ML (ref 0.4–4)
WBC # BLD AUTO: 7.41 K/UL (ref 3.9–12.7)

## 2023-02-14 PROCEDURE — 85025 COMPLETE CBC W/AUTO DIFF WBC: CPT | Performed by: SURGERY

## 2023-02-14 PROCEDURE — 36415 COLL VENOUS BLD VENIPUNCTURE: CPT | Performed by: INTERNAL MEDICINE

## 2023-02-14 PROCEDURE — 84443 ASSAY THYROID STIM HORMONE: CPT | Performed by: INTERNAL MEDICINE

## 2023-02-14 PROCEDURE — 80048 BASIC METABOLIC PNL TOTAL CA: CPT | Performed by: SURGERY

## 2023-02-23 ENCOUNTER — TELEPHONE (OUTPATIENT)
Dept: SURGERY | Facility: CLINIC | Age: 83
End: 2023-02-23
Payer: MEDICARE

## 2023-02-23 ENCOUNTER — ANESTHESIA EVENT (OUTPATIENT)
Dept: SURGERY | Facility: HOSPITAL | Age: 83
End: 2023-02-23
Payer: MEDICARE

## 2023-02-23 NOTE — PRE-PROCEDURE INSTRUCTIONS
PreOp Instructions given:   - Verbal medication information (what to hold and what to take)   - NPO guidelines 2400  - Arrival place directions given; time to be given the day before procedure by the   Surgeon's Office -0930 DOSC  - Bathing with antibacterial soap   - Don't wear any jewelry or bring any valuables AM of surgery   - No makeup or moisturizer to face   - No perfume/cologne, powder, lotions or aftershave   Pt. verbalized understanding.   Pt denies any h/o Anesthesia/Sedation complications or side effects.

## 2023-02-24 ENCOUNTER — HOSPITAL ENCOUNTER (OUTPATIENT)
Facility: HOSPITAL | Age: 83
Discharge: HOME | End: 2023-02-24
Attending: SURGERY | Admitting: SURGERY
Payer: MEDICARE

## 2023-02-24 ENCOUNTER — ANESTHESIA (OUTPATIENT)
Dept: SURGERY | Facility: HOSPITAL | Age: 83
End: 2023-02-24
Payer: MEDICARE

## 2023-02-24 VITALS
OXYGEN SATURATION: 100 % | HEIGHT: 72 IN | WEIGHT: 185 LBS | HEART RATE: 98 BPM | DIASTOLIC BLOOD PRESSURE: 58 MMHG | SYSTOLIC BLOOD PRESSURE: 121 MMHG | BODY MASS INDEX: 25.06 KG/M2 | TEMPERATURE: 98 F | RESPIRATION RATE: 15 BRPM

## 2023-02-24 DIAGNOSIS — K40.90 INGUINAL HERNIA UNILATERAL, NON-RECURRENT: ICD-10-CM

## 2023-02-24 PROCEDURE — 64999 L QL SS: ICD-10-PCS | Mod: ,,, | Performed by: ANESTHESIOLOGY

## 2023-02-24 PROCEDURE — D9220A PRA ANESTHESIA: Mod: ANES,,, | Performed by: ANESTHESIOLOGY

## 2023-02-24 PROCEDURE — 64999 UNLISTED PX NERVOUS SYSTEM: CPT | Mod: ,,, | Performed by: ANESTHESIOLOGY

## 2023-02-24 PROCEDURE — 63600175 PHARM REV CODE 636 W HCPCS: Performed by: STUDENT IN AN ORGANIZED HEALTH CARE EDUCATION/TRAINING PROGRAM

## 2023-02-24 PROCEDURE — 37000008 HC ANESTHESIA 1ST 15 MINUTES: Performed by: SURGERY

## 2023-02-24 PROCEDURE — 94761 N-INVAS EAR/PLS OXIMETRY MLT: CPT

## 2023-02-24 PROCEDURE — D9220A PRA ANESTHESIA: ICD-10-PCS | Mod: ANES,,, | Performed by: ANESTHESIOLOGY

## 2023-02-24 PROCEDURE — 25000003 PHARM REV CODE 250: Performed by: SURGERY

## 2023-02-24 PROCEDURE — 25000003 PHARM REV CODE 250: Performed by: STUDENT IN AN ORGANIZED HEALTH CARE EDUCATION/TRAINING PROGRAM

## 2023-02-24 PROCEDURE — 25000003 PHARM REV CODE 250: Performed by: ANESTHESIOLOGY

## 2023-02-24 PROCEDURE — 64450 NJX AA&/STRD OTHER PN/BRANCH: CPT | Performed by: STUDENT IN AN ORGANIZED HEALTH CARE EDUCATION/TRAINING PROGRAM

## 2023-02-24 PROCEDURE — 49505 PR REPAIR ING HERNIA,5+Y/O,REDUCIBL: ICD-10-PCS | Mod: LT,,, | Performed by: SURGERY

## 2023-02-24 PROCEDURE — 37000009 HC ANESTHESIA EA ADD 15 MINS: Performed by: SURGERY

## 2023-02-24 PROCEDURE — 71000033 HC RECOVERY, INTIAL HOUR: Performed by: SURGERY

## 2023-02-24 PROCEDURE — 36000706: Performed by: SURGERY

## 2023-02-24 PROCEDURE — 49505 PRP I/HERN INIT REDUC >5 YR: CPT | Mod: LT,,, | Performed by: SURGERY

## 2023-02-24 PROCEDURE — D9220A PRA ANESTHESIA: Mod: CRNA,,, | Performed by: STUDENT IN AN ORGANIZED HEALTH CARE EDUCATION/TRAINING PROGRAM

## 2023-02-24 PROCEDURE — 36000707: Performed by: SURGERY

## 2023-02-24 PROCEDURE — 71000015 HC POSTOP RECOV 1ST HR: Performed by: SURGERY

## 2023-02-24 PROCEDURE — D9220A PRA ANESTHESIA: ICD-10-PCS | Mod: CRNA,,, | Performed by: STUDENT IN AN ORGANIZED HEALTH CARE EDUCATION/TRAINING PROGRAM

## 2023-02-24 PROCEDURE — C1781 MESH (IMPLANTABLE): HCPCS | Performed by: SURGERY

## 2023-02-24 DEVICE — MESH HERNIA GROIN KIT 2.4X5.4: Type: IMPLANTABLE DEVICE | Site: GROIN | Status: FUNCTIONAL

## 2023-02-24 RX ORDER — MIDAZOLAM HYDROCHLORIDE 1 MG/ML
.5-4 INJECTION INTRAMUSCULAR; INTRAVENOUS
Status: DISCONTINUED | OUTPATIENT
Start: 2023-02-24 | End: 2023-02-24 | Stop reason: HOSPADM

## 2023-02-24 RX ORDER — BUPIVACAINE HYDROCHLORIDE 5 MG/ML
INJECTION, SOLUTION EPIDURAL; INTRACAUDAL
Status: DISCONTINUED | OUTPATIENT
Start: 2023-02-24 | End: 2023-02-24 | Stop reason: HOSPADM

## 2023-02-24 RX ORDER — ONDANSETRON 2 MG/ML
4 INJECTION INTRAMUSCULAR; INTRAVENOUS ONCE AS NEEDED
Status: DISCONTINUED | OUTPATIENT
Start: 2023-02-24 | End: 2023-02-24 | Stop reason: HOSPADM

## 2023-02-24 RX ORDER — HYDROMORPHONE HYDROCHLORIDE 1 MG/ML
0.2 INJECTION, SOLUTION INTRAMUSCULAR; INTRAVENOUS; SUBCUTANEOUS EVERY 5 MIN PRN
Status: DISCONTINUED | OUTPATIENT
Start: 2023-02-24 | End: 2023-02-24 | Stop reason: HOSPADM

## 2023-02-24 RX ORDER — DEXAMETHASONE SODIUM PHOSPHATE 4 MG/ML
INJECTION, SOLUTION INTRA-ARTICULAR; INTRALESIONAL; INTRAMUSCULAR; INTRAVENOUS; SOFT TISSUE
Status: DISCONTINUED | OUTPATIENT
Start: 2023-02-24 | End: 2023-02-24

## 2023-02-24 RX ORDER — BUPIVACAINE HYDROCHLORIDE 7.5 MG/ML
INJECTION, SOLUTION EPIDURAL; RETROBULBAR
Status: COMPLETED | OUTPATIENT
Start: 2023-02-24 | End: 2023-02-24

## 2023-02-24 RX ORDER — LIDOCAINE HYDROCHLORIDE 20 MG/ML
INJECTION, SOLUTION EPIDURAL; INFILTRATION; INTRACAUDAL; PERINEURAL
Status: DISCONTINUED | OUTPATIENT
Start: 2023-02-24 | End: 2023-02-24

## 2023-02-24 RX ORDER — PROPOFOL 10 MG/ML
VIAL (ML) INTRAVENOUS
Status: DISCONTINUED | OUTPATIENT
Start: 2023-02-24 | End: 2023-02-24

## 2023-02-24 RX ORDER — OXYCODONE HYDROCHLORIDE 5 MG/1
5 TABLET ORAL EVERY 6 HOURS PRN
Qty: 30 TABLET | Refills: 0 | Status: SHIPPED | OUTPATIENT
Start: 2023-02-24 | End: 2023-03-07

## 2023-02-24 RX ORDER — SODIUM CHLORIDE 9 MG/ML
INJECTION, SOLUTION INTRAVENOUS CONTINUOUS
Status: DISCONTINUED | OUTPATIENT
Start: 2023-02-24 | End: 2023-02-24 | Stop reason: HOSPADM

## 2023-02-24 RX ORDER — FENTANYL CITRATE 50 UG/ML
25-200 INJECTION, SOLUTION INTRAMUSCULAR; INTRAVENOUS
Status: DISCONTINUED | OUTPATIENT
Start: 2023-02-24 | End: 2023-02-24 | Stop reason: HOSPADM

## 2023-02-24 RX ORDER — EPHEDRINE SULFATE 50 MG/ML
INJECTION, SOLUTION INTRAVENOUS
Status: DISCONTINUED | OUTPATIENT
Start: 2023-02-24 | End: 2023-02-24

## 2023-02-24 RX ORDER — CEFAZOLIN SODIUM 1 G/3ML
INJECTION, POWDER, FOR SOLUTION INTRAMUSCULAR; INTRAVENOUS
Status: DISCONTINUED | OUTPATIENT
Start: 2023-02-24 | End: 2023-02-24

## 2023-02-24 RX ORDER — PHENYLEPHRINE HCL IN 0.9% NACL 1 MG/10 ML
SYRINGE (ML) INTRAVENOUS
Status: DISCONTINUED | OUTPATIENT
Start: 2023-02-24 | End: 2023-02-24

## 2023-02-24 RX ORDER — DROPERIDOL 2.5 MG/ML
0.62 INJECTION, SOLUTION INTRAMUSCULAR; INTRAVENOUS ONCE AS NEEDED
Status: DISCONTINUED | OUTPATIENT
Start: 2023-02-24 | End: 2023-02-24 | Stop reason: HOSPADM

## 2023-02-24 RX ORDER — ONDANSETRON 2 MG/ML
INJECTION INTRAMUSCULAR; INTRAVENOUS
Status: DISCONTINUED | OUTPATIENT
Start: 2023-02-24 | End: 2023-02-24

## 2023-02-24 RX ORDER — ROCURONIUM BROMIDE 10 MG/ML
INJECTION, SOLUTION INTRAVENOUS
Status: DISCONTINUED | OUTPATIENT
Start: 2023-02-24 | End: 2023-02-24

## 2023-02-24 RX ADMIN — Medication 100 MCG: at 10:02

## 2023-02-24 RX ADMIN — ROCURONIUM BROMIDE 50 MG: 10 INJECTION, SOLUTION INTRAVENOUS at 09:02

## 2023-02-24 RX ADMIN — Medication 200 MCG: at 10:02

## 2023-02-24 RX ADMIN — DEXAMETHASONE SODIUM PHOSPHATE 4 MG: 4 INJECTION INTRA-ARTICULAR; INTRALESIONAL; INTRAMUSCULAR; INTRAVENOUS; SOFT TISSUE at 10:02

## 2023-02-24 RX ADMIN — SODIUM CHLORIDE: 9 INJECTION, SOLUTION INTRAVENOUS at 09:02

## 2023-02-24 RX ADMIN — BUPIVACAINE HYDROCHLORIDE 15 ML: 7.5 INJECTION, SOLUTION EPIDURAL; RETROBULBAR at 09:02

## 2023-02-24 RX ADMIN — EPHEDRINE SULFATE 5 MG: 50 INJECTION INTRAVENOUS at 10:02

## 2023-02-24 RX ADMIN — SUGAMMADEX 200 MG: 100 INJECTION, SOLUTION INTRAVENOUS at 10:02

## 2023-02-24 RX ADMIN — LIDOCAINE HYDROCHLORIDE 100 MG: 20 INJECTION, SOLUTION EPIDURAL; INFILTRATION; INTRACAUDAL; PERINEURAL at 09:02

## 2023-02-24 RX ADMIN — ONDANSETRON 4 MG: 2 INJECTION INTRAMUSCULAR; INTRAVENOUS at 10:02

## 2023-02-24 RX ADMIN — FENTANYL CITRATE 100 MCG: 50 INJECTION INTRAMUSCULAR; INTRAVENOUS at 09:02

## 2023-02-24 RX ADMIN — CEFAZOLIN 2 G: 2 INJECTION, POWDER, FOR SOLUTION INTRAMUSCULAR; INTRAVENOUS at 09:02

## 2023-02-24 RX ADMIN — PROPOFOL 110 MG: 10 INJECTION, EMULSION INTRAVENOUS at 09:02

## 2023-02-24 RX ADMIN — Medication 100 MCG: at 09:02

## 2023-02-24 NOTE — BRIEF OP NOTE
Operative Note       Surgery Date: 2/24/2023     Surgeon(s) and Role:     * Richar Stewart MD - Primary     * Richar Mueller MD - Resident - Assisting    Pre-op Diagnosis:  Non-recurrent bilateral inguinal hernia with obstruction without gangrene [K40.00]    Post-op Diagnosis:  Non-recurrent bilateral inguinal hernia with obstruction without gangrene [K40.00]    Procedure(s) (LRB):  REPAIR, HERNIA, INGUINAL, WITHOUT HISTORY OF PRIOR REPAIR, AGE 5  YEARS OR OLDER (WITH MESH) (Left)    Anesthesia: General    Procedure in Detail/Findings:  Tiny lipoma of cord, moderate to large indirect hernia.    Estimated Blood Loss: Minimal           Specimens (From admission, onward)      None          Implants:   Implant Name Type Inv. Item Serial No.  Lot No. LRB No. Used Action   Large Pre-shaped 13.7x5.9     BARD ACCESS [id number] Left 1 Implanted              Disposition: PACU - hemodynamically stable.           Condition: Good    Attestation:  I was present and scrubbed for the entire procedure.

## 2023-02-24 NOTE — TRANSFER OF CARE
Anesthesia Transfer of Care Note    Patient: Oliver Burk Jr.    Procedure(s) Performed: Procedure(s) (LRB):  REPAIR, HERNIA, INGUINAL, WITHOUT HISTORY OF PRIOR REPAIR, AGE 5  YEARS OR OLDER (WITH MESH) (Left)    Patient location: PACU    Anesthesia Type: general    Transport from OR: Transported from OR on 6-10 L/min O2 by face mask with adequate spontaneous ventilation    Post pain: adequate analgesia    Post assessment: no apparent anesthetic complications    Post vital signs: stable    Level of consciousness: awake, alert and oriented    Nausea/Vomiting: no nausea/vomiting    Complications: none    Transfer of care protocol was followed      Last vitals:   Visit Vitals  /72 (BP Location: Left arm, Patient Position: Lying)   Pulse 87   Temp 36.6 °C (97.9 °F) (Temporal)   Resp 15   Ht 6' (1.829 m)   Wt 83.9 kg (185 lb)   SpO2 100%   BMI 25.09 kg/m²

## 2023-02-24 NOTE — ANESTHESIA PROCEDURE NOTES
Intubation    Date/Time: 2/24/2023 9:53 AM  Performed by: Eliana Gonzáles CRNA  Authorized by: Raleigh Hsu MD     Intubation:     Induction:  Intravenous    Intubated:  Postinduction    Mask Ventilation:  Easy mask    Attempts:  1    Attempted By:  CRNA    Method of Intubation:  Video laryngoscopy    Blade:  Polanco 3    Laryngeal View Grade: Grade I - full view of cords      Difficult Airway Encountered?: No      Complications:  None    Airway Device:  Oral endotracheal tube    Airway Device Size:  7.5    Style/Cuff Inflation:  Cuffed    Inflation Amount (mL):  7    Tube secured:  23    Secured at:  The lips    Placement Verified By:  Capnometry    Complicating Factors:  None    Findings Post-Intubation:  BS equal bilateral and atraumatic/condition of teeth unchanged

## 2023-02-24 NOTE — INTERVAL H&P NOTE
The patient has been examined and the H&P has been reviewed:    I concur with the findings and no changes have occurred since H&P was written.    Anesthesia/Surgery risks, benefits and alternative options discussed and understood by patient/family.          There are no hospital problems to display for this patient.    
done

## 2023-02-24 NOTE — BRIEF OP NOTE
[facility] - Surgery ([facility])  Brief Operative Note    Surgery Date: 2/24/2023     Surgeon(s) and Role:     * Richar Stewart MD - Primary     * Richar Mueller MD - Resident - Assisting        Pre-op Diagnosis:  Non-recurrent bilateral inguinal hernia with obstruction without gangrene [K40.00]    Post-op Diagnosis:  Post-Op Diagnosis Codes:     * Non-recurrent bilateral inguinal hernia with obstruction without gangrene [K40.00]    Procedure(s) (LRB):  REPAIR, HERNIA, INGUINAL, WITHOUT HISTORY OF PRIOR REPAIR, AGE 5  YEARS OR OLDER (WITH MESH) (Left)    Anesthesia: General    Operative Findings: Large left sided direct inguinal hernia. Small cord lipoma excised. Defect repaired with mesh. No apparent complication.     Estimated Blood Loss: 5mL         Specimens:   Specimen (24h ago, onward)      None              Discharge Note    OUTCOME: Patient tolerated treatment/procedure well without complication and is now ready for discharge.    DISPOSITION: Home or Self Care    FINAL DIAGNOSIS:  Inguinal hernia unilateral, non-recurrent    FOLLOWUP: In clinic    DISCHARGE INSTRUCTIONS:    Discharge Procedure Orders   Diet Adult Regular     No driving until:   Order Comments: No Driving while taking narcotic pain medication     Notify your health care provider if you experience any of the following:  temperature >100.4     Notify your health care provider if you experience any of the following:  persistent nausea and vomiting or diarrhea     Notify your health care provider if you experience any of the following:  severe uncontrolled pain     Notify your health care provider if you experience any of the following:  redness, tenderness, or signs of infection (pain, swelling, redness, odor or green/yellow discharge around incision site)     Remove dressing in 24 hours     Lifting restrictions   Order Comments: No lifting over 10 pounds for 6 weeks     Activity as tolerated

## 2023-02-24 NOTE — ANESTHESIA PREPROCEDURE EVALUATION
02/24/2023  Oliver Burk Jr. is a 82 y.o., male.      Pre-op Assessment          Review of Systems  Anesthesia Hx:  No problems with previous Anesthesia    Cardiovascular:   Hypertension Denies CAD.     Functional Capacity Can you climb two flights of stairs? ==> Yes    Pulmonary:   Denies Asthma.  Denies Sleep Apnea.    Renal/:   Denies Chronic Renal Disease.     Hepatic/GI:   Denies PUD. Denies GERD. Denies Liver Disease.    Neurological:   Denies CVA. Denies Seizures.    Endocrine:   Denies Diabetes. Denies Hypothyroidism.        Physical Exam  General: Alert    Airway:  Mallampati: I   Mouth Opening: Normal  TM Distance: Normal  Tongue: Normal  Neck ROM: Normal ROM    Dental:  Intact        Anesthesia Plan  Type of Anesthesia, risks & benefits discussed:    Anesthesia Type: Gen ETT  Intra-op Monitoring Plan: Standard ASA Monitors  Post Op Pain Control Plan: multimodal analgesia and IV/PO Opioids PRN  Induction:  IV  Airway Plan: Direct  Informed Consent: Informed consent signed with the Patient and all parties understand the risks and agree with anesthesia plan.  All questions answered.   ASA Score: 2    Ready For Surgery From Anesthesia Perspective.     .

## 2023-02-24 NOTE — PATIENT INSTRUCTIONS
Post-op instructions:    - OK to remove island dressing 24 hours after surgery  - OK for regular diet  - No driving while taking narcotic medication  - No lifting more than 10 pounds for 4 weeks  - OK to shower with warm soap and water. No soaking in any water  - Clinic visit to be scheduled in two weeks

## 2023-02-24 NOTE — OP NOTE
[facility] - Surgery ([facility])  Surgery Department  Operative Note    SUMMARY     Date of Procedure: 2/24/2023     Procedure: Procedure(s) (LRB):  REPAIR, HERNIA, INGUINAL, WITHOUT HISTORY OF PRIOR REPAIR, AGE 5  YEARS OR OLDER (WITH MESH) (Left)     Surgeon(s) and Role:     * Richar Stewart MD - Primary     * Richar Mueller MD - Resident - Assisting        Pre-Operative Diagnosis: Non-recurrent bilateral inguinal hernia with obstruction without gangrene [K40.00]    Post-Operative Diagnosis: Post-Op Diagnosis Codes:     * Non-recurrent bilateral inguinal hernia with obstruction without gangrene [K40.00]    Anesthesia: General    Operative Findings (including complications, if any): Large left sided direct inguinal hernia. Small cord lipoma excised. Defect repaired with mesh. No apparent complication.    Description of Technical Procedures:   Patient was identified in preoperative holding and brought back to the operating room. The patient was placed supine on the operating table and padded appropriately. He was intubated and general anesthesia was induced by the anesthesia team. His groin was shaved with electric clippers and prepped and draped in the standard sterile surgical fashion. A timeout was performed and all team members present agreed this was the correct procedure on the correct side of the correct patient. We also confirmed administration of appropriate preoperative antibiotics.     We marked out an appropriate transverse incision overlying the left inguinal canal. We then injected local anesthetic and a 7 cm skin incision was made in the left groin. Subcutaneous tissue was divided with Bovie electrocautery. This dissection was carried down through Erica's fascia down to the aponeurosis of the external oblique. The aponeurosis of the external oblique was incised in line with its fibers, first with a scalpel and then Metzenbaum scissors, and this incision was extended to the external spermatic  ring. Two hemostats were used to retract the two divided edges of the external oblique. The ilioinguinal nerve was identified and spared. The inguinal canal contents were bluntly encircled, first digitally and then with a Penrose drain. The spermatic cord and it's contents were then inspected revealing a small cord lipoma which was  from the cord contents using Bovie electrocautery. We proceeded to identify a moderately sized direct inguinal hernia with a disrupted inguinal floor. This contained a moderate amount of fatty tissue, but no identifiable bowel or other intraabdominal contents. This was easily reduced back into the peritoneal cavity and a sponge stick was introduced through the defect to help clear the operative field and maintain reduction of the hernia contents. Appropriate borders of the inguinal canal were identified and it was decided to repair the defect with a piece of polypropylene mesh. The mesh was tacked to the pubis symphysis using (2) 0 Prolene sutures. These sutures were then ran along both the inferior and superior border of the mesh. Inferiorly, the mesh was anchored to the shelving edge of the inguinal ligament. Superiorly, the mesh was anchored to the conjoint tendon. The tails of the mesh were brought together around the cord structures creating a new internal ring that just admitted the tip of a finger and secured using (2) interrupted 0-Prolene sutures. The tails of the mesh were then trimmed and laid flat towards the ASIS. The mesh was inspected and noted to lie in appropriate position without any redundancy or tension. The aponeurosis of the external oblique was closed with a running 2-0 Vicryl suture. Erica's fascia was closed with a running 3-0 Vicryl suture and the skin was closed with a running subcuticular 4-0 Monocryl suture. Dermabond was then applied over the incision/ and an island dressing placed on top. The patient was woken from anesthesia and extubated  successfully. The patient was then transported to the PACU in stable condition. All sponge, instrument and needle counts were correct at the end of the case.     Estimated Blood Loss (EBL): 5mL           Implants:   Implant Name Type Inv. Item Serial No.  Lot No. LRB No. Used Action   Large Pre-shaped [id number]     BARD ACCESS [id number] Left 1 Implanted       Specimens:   Specimen (24h ago, onward)      None                    Condition: Good    Disposition: PACU - hemodynamically stable.

## 2023-02-25 NOTE — PROGRESS NOTES
"Phone call: He says he "feels like a million".  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  He is urinating ok.  "

## 2023-02-27 NOTE — ANESTHESIA POSTPROCEDURE EVALUATION
Anesthesia Post Evaluation    Patient: Oliver Burk JrIsaías    Procedure(s) Performed: Procedure(s) (LRB):  REPAIR, HERNIA, INGUINAL, WITHOUT HISTORY OF PRIOR REPAIR, AGE 5  YEARS OR OLDER (WITH MESH) (Left)    Final Anesthesia Type: general      Patient location during evaluation: PACU  Patient participation: Yes- Able to Participate  Level of consciousness: awake  Post-procedure vital signs: reviewed and stable  Pain management: adequate  Airway patency: patent    PONV status at discharge: No PONV  Anesthetic complications: no      Cardiovascular status: blood pressure returned to baseline  Respiratory status: unassisted  Hydration status: euvolemic  Follow-up not needed.          Vitals Value Taken Time   /58 02/24/23 1132   Temp 36.8 °C (98.2 °F) 02/24/23 1130   Pulse 102 02/24/23 1138   Resp 17 02/24/23 1133   SpO2 96 % 02/24/23 1138   Vitals shown include unvalidated device data.      Event Time   Out of Recovery 11:05:00         Pain/Breonna Score: No data recorded

## 2023-03-07 ENCOUNTER — OFFICE VISIT (OUTPATIENT)
Dept: SURGERY | Facility: CLINIC | Age: 83
End: 2023-03-07
Payer: MEDICARE

## 2023-03-07 VITALS
HEIGHT: 72 IN | SYSTOLIC BLOOD PRESSURE: 141 MMHG | BODY MASS INDEX: 25.41 KG/M2 | WEIGHT: 187.63 LBS | DIASTOLIC BLOOD PRESSURE: 67 MMHG | HEART RATE: 76 BPM

## 2023-03-07 DIAGNOSIS — Z09 POSTOP CHECK: Primary | ICD-10-CM

## 2023-03-07 PROBLEM — K40.90 INGUINAL HERNIA UNILATERAL, NON-RECURRENT: Status: RESOLVED | Noted: 2023-02-24 | Resolved: 2023-03-07

## 2023-03-07 PROCEDURE — 99024 PR POST-OP FOLLOW-UP VISIT: ICD-10-PCS | Mod: POP,,, | Performed by: SURGERY

## 2023-03-07 PROCEDURE — 99024 POSTOP FOLLOW-UP VISIT: CPT | Mod: POP,,, | Performed by: SURGERY

## 2023-03-07 PROCEDURE — 99999 PR PBB SHADOW E&M-EST. PATIENT-LVL III: CPT | Mod: PBBFAC,,, | Performed by: SURGERY

## 2023-03-07 PROCEDURE — 99213 OFFICE O/P EST LOW 20 MIN: CPT | Mod: PBBFAC | Performed by: SURGERY

## 2023-03-07 PROCEDURE — 99999 PR PBB SHADOW E&M-EST. PATIENT-LVL III: ICD-10-PCS | Mod: PBBFAC,,, | Performed by: SURGERY

## 2023-03-07 NOTE — PROGRESS NOTES
I have seen the patient, reviewed the Student's history and physical, assessment and plan. I have personally interviewed and examined the patient at bedside and: agree with the findings.     S/p open lih with mesh 2/24/23.  He only complains that he is not allowed to exercise.  Wounds clear with moderate incisional swelling.  Light duty until April 7 and rtc prn.

## 2023-03-07 NOTE — LETTER
March 7, 2023        Richar Patterson MD  [address]  [facility] [address]             [facility]  [address]  [facility] [address]  Phone: [phone]   Patient: Oliver Burk Jr.   MR Number: [id number]   YOB: 1940   Date of Visit: 3/7/2023       Dear Dr. Patterson:    Thank you for referring Oliver Burk to me for evaluation. Attached you will find relevant portions of my assessment and plan of care.    If you have questions, please do not hesitate to call me. I look forward to following Oliver Burk along with you.    Sincerely,      Richar Stewart MD            CC  No Recipients    Enclosure

## 2023-03-07 NOTE — PROGRESS NOTES
Patient ID: Oliver Burk      Chief Complaint: Inguinal hernia s/p repair on 2/ HPI:   -was not given oxycodone  -but no pain at incision site  -no erythema at incision site - healing nicely  -appropriately swollen superiorly to incision site  -baseline urinary frequency, no dysuria  -BM okay, no n/v    Review of Systems       Objective:      Physical Exam   Physical Exam       Vitals:   Vitals:    03/07/23 1141   BP: (!) 141/67   Pulse: 76   Weight: 85.1 kg (187 lb 9.8 oz)   Height: 6' (1.829 m)          Current Outpatient Medications:     ALPRAZolam (XANAX) 0.25 MG tablet, Take one tablet hs for sleep (Patient taking differently: Take 0.25 mg by mouth nightly as needed. Take one tablet hs for sleep), Disp: 90 tablet, Rfl: 0    diclofenac sodium (VOLTAREN) 1 % Gel, Apply 1 Tube topically 2 (two) times daily as needed., Disp: , Rfl:     gabapentin (NEURONTIN) 300 MG capsule, Take 300 mg by mouth every evening., Disp: , Rfl:     meloxicam (MOBIC) 15 MG tablet, Take 1 tablet by mouth every evening., Disp: , Rfl: 3    metoprolol succinate (TOPROL-XL) 100 MG 24 hr tablet, Take 1 tablet (100 mg total) by mouth once daily. (Patient taking differently: Take 100 mg by mouth every evening.), Disp: 90 tablet, Rfl: 3    rivaroxaban (XARELTO) 20 mg Tab, Take 1 tablet (20 mg total) by mouth daily with dinner or evening meal., Disp: 90 tablet, Rfl: 3    simvastatin (ZOCOR) 20 MG tablet, TAKE ONE TABLET BY MOUTH ONCE DAILY (Patient taking differently: Take 20 mg by mouth every evening.), Disp: 30 tablet, Rfl: 12    tamsulosin (FLOMAX) 0.4 mg Cap, TAKE ONE CAPSULE BY MOUTH ONCE DAILY AT BEDTIME, Disp: 90 capsule, Rfl: 3   Assessment:       Patient Active Problem List    Diagnosis Date Noted    Inguinal hernia unilateral, non-recurrent 02/24/2023    ROMEO (obstructive sleep apnea) 01/11/2023    Paroxysmal atrial fibrillation 01/11/2023    Snoring 01/05/2023    Persistent atrial fibrillation 10/07/2022    HTN (hypertension)  10/07/2022    Post-operative state 01/28/2019    Nuclear sclerotic cataract of left eye 01/28/2019    Nuclear sclerosis, bilateral 01/14/2019    Radiculopathy of leg 06/29/2016    Back pain 02/10/2016    Dermatochalasis - Both Eyes 12/06/2013          Plan:       Oliver Burk Jr.  was seen today for follow-up and may need lab work.    Diagnoses and all orders for this visit:    There are no diagnoses linked to this encounter.

## 2023-03-22 ENCOUNTER — TELEPHONE (OUTPATIENT)
Dept: PULMONOLOGY | Facility: CLINIC | Age: 83
End: 2023-03-22
Payer: MEDICARE

## 2023-03-22 RX ORDER — AZITHROMYCIN 250 MG/1
TABLET, FILM COATED ORAL
Qty: 6 TABLET | Refills: 0 | Status: SHIPPED | OUTPATIENT
Start: 2023-03-22 | End: 2023-03-27

## 2023-03-22 RX ORDER — METHYLPREDNISOLONE 4 MG/1
TABLET ORAL
Qty: 21 EACH | Refills: 0 | Status: SHIPPED | OUTPATIENT
Start: 2023-03-22 | End: 2023-04-12

## 2023-05-03 DIAGNOSIS — M54.9 DORSALGIA, UNSPECIFIED: Primary | ICD-10-CM

## 2023-05-03 DIAGNOSIS — M54.50 LOW BACK PAIN, UNSPECIFIED BACK PAIN LATERALITY, UNSPECIFIED CHRONICITY, UNSPECIFIED WHETHER SCIATICA PRESENT: ICD-10-CM

## 2023-05-04 ENCOUNTER — TELEPHONE (OUTPATIENT)
Dept: PULMONOLOGY | Facility: CLINIC | Age: 83
End: 2023-05-04
Payer: MEDICARE

## 2023-05-04 NOTE — TELEPHONE ENCOUNTER
Dr Patterson ordered a lumbar spine MRI for Mr Burk and he wants a Open MRI which [facility] does not offer. I called [facility] at [phone] and they do have a Open scan at their [place] location [address] [place] [address]. I faxed the order and insurance information to [phone]. I called Mr uBrk  and gave him the phone number to call to schedule. He is familiar with the location and will schedule his exam. Ashley Whitaker LPN

## 2023-05-25 ENCOUNTER — TELEPHONE (OUTPATIENT)
Dept: NEUROSURGERY | Facility: CLINIC | Age: 83
End: 2023-05-25
Payer: MEDICARE

## 2023-05-25 NOTE — TELEPHONE ENCOUNTER
Called pt. Advised to contact [facility] and req MRI be sent over LI.    Sent email to Loyd at [facility] as a heads up.    ----- Message from Kayce Cox sent at 5/25/2023  8:10 AM CDT -----  Contact: @[phone]  Pt called in regards to speaking to someone in regards to some MRI and making a appointment  .....Please call and adv @[phone]

## 2023-05-30 ENCOUNTER — TELEPHONE (OUTPATIENT)
Dept: NEUROSURGERY | Facility: CLINIC | Age: 83
End: 2023-05-30
Payer: MEDICARE

## 2023-06-01 ENCOUNTER — TELEPHONE (OUTPATIENT)
Dept: PULMONOLOGY | Facility: CLINIC | Age: 83
End: 2023-06-01
Payer: MEDICARE

## 2023-06-01 NOTE — TELEPHONE ENCOUNTER
----- Message from Julianne Herron sent at 6/1/2023  8:41 AM CDT -----  Regarding: Pt Advice  Contact: [phone] w [facility]  Lula cho [facility] calling to check status of office notes for march 24 - may 30 request was  faxed over may 30. Please call refrence no. [id number]

## 2023-06-01 NOTE — TELEPHONE ENCOUNTER
I was asked to fax clinic notes on Mr Burk from Dr Pritchett. I found two and faxed it to Lula at [facility] at [phone]. Ashley Whitaker LPN

## 2023-06-06 ENCOUNTER — TELEPHONE (OUTPATIENT)
Dept: NEUROSURGERY | Facility: CLINIC | Age: 83
End: 2023-06-06
Payer: MEDICARE

## 2023-06-07 ENCOUNTER — OFFICE VISIT (OUTPATIENT)
Dept: NEUROSURGERY | Facility: CLINIC | Age: 83
End: 2023-06-07
Payer: MEDICARE

## 2023-06-07 VITALS
DIASTOLIC BLOOD PRESSURE: 97 MMHG | SYSTOLIC BLOOD PRESSURE: 150 MMHG | HEIGHT: 72 IN | OXYGEN SATURATION: 97 % | WEIGHT: 190.25 LBS | HEART RATE: 125 BPM | BODY MASS INDEX: 25.77 KG/M2

## 2023-06-07 DIAGNOSIS — M54.9 BACK PAIN, UNSPECIFIED BACK LOCATION, UNSPECIFIED BACK PAIN LATERALITY, UNSPECIFIED CHRONICITY: Primary | ICD-10-CM

## 2023-06-07 DIAGNOSIS — M54.16 LUMBAR RADICULOPATHY: ICD-10-CM

## 2023-06-07 PROCEDURE — 99204 PR OFFICE/OUTPT VISIT, NEW, LEVL IV, 45-59 MIN: ICD-10-PCS | Mod: S$PBB,,, | Performed by: NEUROLOGICAL SURGERY

## 2023-06-07 PROCEDURE — 99204 OFFICE O/P NEW MOD 45 MIN: CPT | Mod: S$PBB,,, | Performed by: NEUROLOGICAL SURGERY

## 2023-06-07 PROCEDURE — 99999 PR PBB SHADOW E&M-EST. PATIENT-LVL IV: CPT | Mod: PBBFAC,,, | Performed by: NEUROLOGICAL SURGERY

## 2023-06-07 PROCEDURE — 99214 OFFICE O/P EST MOD 30 MIN: CPT | Mod: PBBFAC | Performed by: NEUROLOGICAL SURGERY

## 2023-06-07 PROCEDURE — 99999 PR PBB SHADOW E&M-EST. PATIENT-LVL IV: ICD-10-PCS | Mod: PBBFAC,,, | Performed by: NEUROLOGICAL SURGERY

## 2023-06-07 NOTE — PROGRESS NOTES
Subjective:   I, Rina Jim, attest that this documentation has been prepared under the direction and in the presence of Scott Menjivar MD.     Patient ID: Oliver Burk Jr. is a 82 y.o. male     Chief Complaint: Knee Pain      HPI  Mr. Oliver Burk Jr. is a 82 y.o. gentleman With PMHx HTN, HLD, and A-fib, who was referred to me by DR. Patterson, Pulmonary, who presents today to establish care. This is a pt who was recently diagnosed with Atrial Fibrillation. He has since been placed on Xarelto and Metoprolol. At the time of his diagnosis, the pt reported acute gait dysfunction. He has also noted an associated pain to the bilateral knees. Described as an aching sensation. He has been attempting to massage his knee caps for relief. His pain does not radiate, although he does note intermittent numbness to his R foot/toes. He states this is chronic and unchanged. He has since been seen by Pain Management. He reports physical limitations 2/2 pain.    Review of Systems   Constitutional:  Negative for activity change, appetite change, fatigue, fever and unexpected weight change.   HENT:  Negative for facial swelling.    Eyes: Negative.    Respiratory: Negative.     Cardiovascular: Negative.    Gastrointestinal:  Negative for diarrhea, nausea and vomiting.   Endocrine: Negative.    Genitourinary: Negative.    Musculoskeletal:  Positive for arthralgias and gait problem. Negative for back pain, joint swelling, myalgias and neck pain.   Neurological:  Negative for dizziness, seizures, weakness and headaches.   Psychiatric/Behavioral: Negative.        Past Medical History:   Diagnosis Date    Arthritis     Atrial fibrillation     Basal cell carcinoma     Cancer     prostate    Hyperlipidemia     Hypertension     on medication       Objective:      Vitals:    06/07/23 1007   BP: (!) 150/97   Pulse: (!) 125      Physical Exam  Constitutional:       General: He is not in acute distress.     Appearance: Normal appearance.    HENT:      Head: Normocephalic and atraumatic.   Pulmonary:      Effort: Pulmonary effort is normal.   Musculoskeletal:      Cervical back: Neck supple.   Neurological:      Mental Status: He is alert and oriented to person, place, and time.      GCS: GCS eye subscore is 4. GCS verbal subscore is 5. GCS motor subscore is 6.      Cranial Nerves: No cranial nerve deficit.      Motor: Weakness present.        IMAGING:  MRI Previous (5/25/23)  Degenerative disc disease present. R>L foramen opening at L5/S1. Degenerative scoliosis.  Postoperative changes of prior decompression without complications seen.    I have personally reviewed the images with the pt.      I, Dr. Scott Menjivar, personally performed the services described in this documentation. All medical record entries made by the scribe, Rina Jim, were at my direction and in my presence.  I have reviewed the chart and agree that the record reflects my personal performance and is accurate and complete. Scott Menjivar MD. 06/07/2023    Assessment:       Scoliosis.  DDD Lumbar spine.      Plan:   I have personally reviewed the MRI previous with the pt which shows degenerative disc disease. R>L foramen opening at L5/S1. Canal opening at L3/4 and L4/5. Postoperative changes of prior decompression without complications seen.    Surgical intervention is not warranted at this time. I will refer the pt to [facility].    I will schedule the patient for 3 month follow up with MRI lumbar spine.

## 2023-06-12 ENCOUNTER — TELEPHONE (OUTPATIENT)
Dept: NEUROSURGERY | Facility: CLINIC | Age: 83
End: 2023-06-12
Payer: MEDICARE

## 2023-06-12 NOTE — TELEPHONE ENCOUNTER
Returned pt's call  Told him I called & L/M to contact patient re: referral. He was fine with that.    ----- Message from Becky Strong sent at 6/12/2023  2:33 PM CDT -----  Regarding: Advise:[facility] Inquiry  Contact: @[phone]  Pt called in and is requesting a call back. Pt stated the doctor recommended the [facility] program but pt has not received any word back. Please call to further discuss. Thank you

## 2023-06-13 ENCOUNTER — TELEPHONE (OUTPATIENT)
Dept: PULMONOLOGY | Facility: CLINIC | Age: 83
End: 2023-06-13
Payer: MEDICARE

## 2023-06-13 NOTE — TELEPHONE ENCOUNTER
----- Message from Audelia Ac MA sent at 6/13/2023  1:03 PM CDT -----  Regarding: FW: request for clinical Notes  Contact: [phone]    ----- Message -----  From: Alexander Garcia  Sent: 6/13/2023  12:39 PM CDT  To: Shreyas Melton Staff  Subject: request for clinical Notes                       Dwain cho [facility] calling regarding Patient Advice (message) for call to f/U about clinical notes form 03/24 to 05/30 that was suppose to be fax over asking if they can be fax  over again [phone] call back # [phone] Ref#[id number]

## 2023-06-13 NOTE — TELEPHONE ENCOUNTER
[facility] is asking for clinic notes on Mr Burk from Dr Pritchett. Mr Burk has not seen Dr Pritchett except for his initial visit. I will ask Dr Pritchett where he can fit  Mr Burk in and call him. Ashley Whitaker LPN

## 2023-06-20 ENCOUNTER — TELEPHONE (OUTPATIENT)
Dept: PULMONOLOGY | Facility: CLINIC | Age: 83
End: 2023-06-20
Payer: MEDICARE

## 2023-06-20 DIAGNOSIS — M54.10 RADICULOPATHY OF LEG: ICD-10-CM

## 2023-06-20 RX ORDER — GABAPENTIN 300 MG/1
300 CAPSULE ORAL 3 TIMES DAILY
Qty: 270 CAPSULE | Refills: 3 | Status: SHIPPED | OUTPATIENT
Start: 2023-06-20 | End: 2023-08-08

## 2023-07-07 ENCOUNTER — TELEPHONE (OUTPATIENT)
Dept: PULMONOLOGY | Facility: CLINIC | Age: 83
End: 2023-07-07
Payer: MEDICARE

## 2023-07-07 NOTE — TELEPHONE ENCOUNTER
----- Message from Audelia Ac MA sent at 7/6/2023  1:32 PM CDT -----    ----- Message -----  From: Lloyd Washington  Sent: 7/6/2023   1:04 PM CDT  To: [facility] Staff    Name of Who is Calling:DELMAR GUZMAN JR. [[id number]]                What is the request in detail: Pt is asking for a call,pt didn't leave any details.Please call back to further assist.                 Can the clinic reply by [facility]: No                What Number to Call Back if not in [facility]:[phone]

## 2023-07-07 NOTE — TELEPHONE ENCOUNTER
Mr Burk called and is asking if he can have a Virtual Visit with Dr Pritchett instead of coming to clinic. His insurance requires a visit to pay for his cpap machine. I told Mr Burk I will ask Dr Pritchett if this is possible and call him next week and he agreed. Ashley Phillips

## 2023-07-16 ENCOUNTER — TELEPHONE (OUTPATIENT)
Dept: PULMONOLOGY | Facility: CLINIC | Age: 83
End: 2023-07-16
Payer: MEDICARE

## 2023-07-16 RX ORDER — METHYLPREDNISOLONE 4 MG/1
TABLET ORAL
Qty: 21 EACH | Refills: 0 | Status: SHIPPED | OUTPATIENT
Start: 2023-07-16 | End: 2023-07-24

## 2023-07-24 ENCOUNTER — CLINICAL SUPPORT (OUTPATIENT)
Dept: INTERNAL MEDICINE | Facility: CLINIC | Age: 83
End: 2023-07-24
Payer: MEDICARE

## 2023-07-24 ENCOUNTER — OFFICE VISIT (OUTPATIENT)
Dept: INTERNAL MEDICINE | Facility: CLINIC | Age: 83
End: 2023-07-24
Payer: MEDICARE

## 2023-07-24 ENCOUNTER — HOSPITAL ENCOUNTER (OUTPATIENT)
Dept: CARDIOLOGY | Facility: CLINIC | Age: 83
Discharge: HOME | End: 2023-07-24
Payer: MEDICARE

## 2023-07-24 DIAGNOSIS — R82.90 NONSPECIFIC FINDING ON EXAMINATION OF URINE: ICD-10-CM

## 2023-07-24 DIAGNOSIS — R79.9 ABNORMAL BLOOD CHEMISTRY: ICD-10-CM

## 2023-07-24 DIAGNOSIS — R53.0 NEOPLASTIC MALIGNANT RELATED FATIGUE: ICD-10-CM

## 2023-07-24 DIAGNOSIS — J01.90 ACUTE BACTERIAL SINUSITIS: ICD-10-CM

## 2023-07-24 DIAGNOSIS — E53.8 VITAMIN B 12 DEFICIENCY: ICD-10-CM

## 2023-07-24 DIAGNOSIS — N40.1 BENIGN PROSTATIC HYPERPLASIA WITH LOWER URINARY TRACT SYMPTOMS, SYMPTOM DETAILS UNSPECIFIED: ICD-10-CM

## 2023-07-24 DIAGNOSIS — E78.5 HYPERLIPIDEMIA, UNSPECIFIED HYPERLIPIDEMIA TYPE: Primary | ICD-10-CM

## 2023-07-24 DIAGNOSIS — J30.1 NON-SEASONAL ALLERGIC RHINITIS DUE TO POLLEN: ICD-10-CM

## 2023-07-24 DIAGNOSIS — Z85.46 HISTORY OF PROSTATE CANCER: ICD-10-CM

## 2023-07-24 DIAGNOSIS — E78.5 DYSLIPIDEMIA, GOAL LDL BELOW 100: ICD-10-CM

## 2023-07-24 DIAGNOSIS — I48.0 PAROXYSMAL ATRIAL FIBRILLATION: ICD-10-CM

## 2023-07-24 DIAGNOSIS — R53.83 FATIGUE, UNSPECIFIED TYPE: ICD-10-CM

## 2023-07-24 DIAGNOSIS — E55.9 VITAMIN D DEFICIENCY, UNSPECIFIED: ICD-10-CM

## 2023-07-24 DIAGNOSIS — I48.0 PAROXYSMAL ATRIAL FIBRILLATION: Primary | ICD-10-CM

## 2023-07-24 DIAGNOSIS — R73.09 IMPAIRED GLUCOSE TOLERANCE TEST: ICD-10-CM

## 2023-07-24 DIAGNOSIS — B96.89 ACUTE BACTERIAL SINUSITIS: ICD-10-CM

## 2023-07-24 DIAGNOSIS — G47.33 OSA (OBSTRUCTIVE SLEEP APNEA): Chronic | ICD-10-CM

## 2023-07-24 DIAGNOSIS — R73.09 ELEVATED HEMOGLOBIN A1C: ICD-10-CM

## 2023-07-24 DIAGNOSIS — Z12.5 SPECIAL SCREENING FOR MALIGNANT NEOPLASM OF PROSTATE: ICD-10-CM

## 2023-07-24 DIAGNOSIS — E53.8 FOLATE DEFICIENCY: ICD-10-CM

## 2023-07-24 LAB
25(OH)D3+25(OH)D2 SERPL-MCNC: 30 NG/ML (ref 30–96)
ALBUMIN SERPL BCP-MCNC: 3.8 G/DL (ref 3.5–5.2)
ALP SERPL-CCNC: 83 U/L (ref 55–135)
ALT SERPL W/O P-5'-P-CCNC: 15 U/L (ref 10–44)
ANION GAP SERPL CALC-SCNC: 7 MMOL/L (ref 8–16)
AST SERPL-CCNC: 15 U/L (ref 10–40)
BILIRUB SERPL-MCNC: 1.4 MG/DL (ref 0.1–1)
BILIRUB UR QL STRIP: NEGATIVE
BUN SERPL-MCNC: 11 MG/DL (ref 8–23)
CALCIUM SERPL-MCNC: 10.1 MG/DL (ref 8.7–10.5)
CHLORIDE SERPL-SCNC: 103 MMOL/L (ref 95–110)
CHOLEST SERPL-MCNC: 116 MG/DL (ref 120–199)
CHOLEST/HDLC SERPL: 2.4 {RATIO} (ref 2–5)
CLARITY UR REFRACT.AUTO: CLEAR
CO2 SERPL-SCNC: 26 MMOL/L (ref 23–29)
COLOR UR AUTO: YELLOW
COMPLEXED PSA SERPL-MCNC: <0.01 NG/ML (ref 0–4)
CREAT SERPL-MCNC: 0.8 MG/DL (ref 0.5–1.4)
ERYTHROCYTE [DISTWIDTH] IN BLOOD BY AUTOMATED COUNT: 12.7 % (ref 11.5–14.5)
EST. GFR  (NO RACE VARIABLE): >60 ML/MIN/1.73 M^2
ESTIMATED AVG GLUCOSE: 123 MG/DL (ref 68–131)
FOLATE SERPL-MCNC: 6.7 NG/ML (ref 4–24)
GLUCOSE SERPL-MCNC: 101 MG/DL (ref 70–110)
GLUCOSE UR QL STRIP: NEGATIVE
HBA1C MFR BLD: 5.9 % (ref 4–5.6)
HCT VFR BLD AUTO: 47.5 % (ref 40–54)
HDLC SERPL-MCNC: 49 MG/DL (ref 40–75)
HDLC SERPL: 42.2 % (ref 20–50)
HGB BLD-MCNC: 15.6 G/DL (ref 14–18)
HGB UR QL STRIP: NEGATIVE
HYALINE CASTS UR QL AUTO: 1 /LPF
KETONES UR QL STRIP: ABNORMAL
LDLC SERPL CALC-MCNC: 57.2 MG/DL (ref 63–159)
LEUKOCYTE ESTERASE UR QL STRIP: NEGATIVE
MCH RBC QN AUTO: 32.8 PG (ref 27–31)
MCHC RBC AUTO-ENTMCNC: 32.8 G/DL (ref 32–36)
MCV RBC AUTO: 100 FL (ref 82–98)
MICROSCOPIC COMMENT: NORMAL
NITRITE UR QL STRIP: NEGATIVE
NONHDLC SERPL-MCNC: 67 MG/DL
PH UR STRIP: 6 [PH] (ref 5–8)
PLATELET # BLD AUTO: 198 K/UL (ref 150–450)
PMV BLD AUTO: 11.4 FL (ref 9.2–12.9)
POTASSIUM SERPL-SCNC: 4.4 MMOL/L (ref 3.5–5.1)
PROT SERPL-MCNC: 7.1 G/DL (ref 6–8.4)
PROT UR QL STRIP: NEGATIVE
RBC # BLD AUTO: 4.75 M/UL (ref 4.6–6.2)
RBC #/AREA URNS AUTO: 1 /HPF (ref 0–4)
SODIUM SERPL-SCNC: 136 MMOL/L (ref 136–145)
SP GR UR STRIP: 1.01 (ref 1–1.03)
TRIGL SERPL-MCNC: 49 MG/DL (ref 30–150)
TSH SERPL DL<=0.005 MIU/L-ACNC: 1.26 UIU/ML (ref 0.4–4)
URN SPEC COLLECT METH UR: ABNORMAL
VIT B12 SERPL-MCNC: 567 PG/ML (ref 210–950)
WBC # BLD AUTO: 9.21 K/UL (ref 3.9–12.7)
WBC #/AREA URNS AUTO: 0 /HPF (ref 0–5)

## 2023-07-24 PROCEDURE — 90670 PCV13 VACCINE IM: CPT | Mod: PBBFAC

## 2023-07-24 PROCEDURE — 99999 PR PBB SHADOW E&M-EST. PATIENT-LVL II: ICD-10-PCS | Mod: PBBFAC,,, | Performed by: INTERNAL MEDICINE

## 2023-07-24 PROCEDURE — 84153 ASSAY OF PSA TOTAL: CPT | Performed by: INTERNAL MEDICINE

## 2023-07-24 PROCEDURE — 93010 ELECTROCARDIOGRAM REPORT: CPT | Mod: S$PBB,,, | Performed by: INTERNAL MEDICINE

## 2023-07-24 PROCEDURE — 82306 VITAMIN D 25 HYDROXY: CPT | Performed by: INTERNAL MEDICINE

## 2023-07-24 PROCEDURE — 80053 COMPREHEN METABOLIC PANEL: CPT | Performed by: INTERNAL MEDICINE

## 2023-07-24 PROCEDURE — 80061 LIPID PANEL: CPT | Performed by: INTERNAL MEDICINE

## 2023-07-24 PROCEDURE — 82746 ASSAY OF FOLIC ACID SERUM: CPT | Performed by: INTERNAL MEDICINE

## 2023-07-24 PROCEDURE — 99999PBSHW PNEUMOCOCCAL CONJUGATE VACCINE 13-VALENT LESS THAN 5YO & GREATER THAN: ICD-10-PCS | Mod: PBBFAC,,,

## 2023-07-24 PROCEDURE — 81001 URINALYSIS AUTO W/SCOPE: CPT | Performed by: INTERNAL MEDICINE

## 2023-07-24 PROCEDURE — 85027 COMPLETE CBC AUTOMATED: CPT | Performed by: INTERNAL MEDICINE

## 2023-07-24 PROCEDURE — G0009 ADMIN PNEUMOCOCCAL VACCINE: HCPCS | Mod: PBBFAC

## 2023-07-24 PROCEDURE — 84443 ASSAY THYROID STIM HORMONE: CPT | Performed by: INTERNAL MEDICINE

## 2023-07-24 PROCEDURE — 99999PBSHW PNEUMOCOCCAL CONJUGATE VACCINE 13-VALENT LESS THAN 5YO & GREATER THAN: Mod: PBBFAC,,,

## 2023-07-24 PROCEDURE — 99212 OFFICE O/P EST SF 10 MIN: CPT | Mod: PBBFAC,25 | Performed by: INTERNAL MEDICINE

## 2023-07-24 PROCEDURE — 99999 PR PBB SHADOW E&M-EST. PATIENT-LVL II: CPT | Mod: PBBFAC,,, | Performed by: INTERNAL MEDICINE

## 2023-07-24 PROCEDURE — 83921 ORGANIC ACID SINGLE QUANT: CPT | Performed by: INTERNAL MEDICINE

## 2023-07-24 PROCEDURE — 83036 HEMOGLOBIN GLYCOSYLATED A1C: CPT | Performed by: INTERNAL MEDICINE

## 2023-07-24 PROCEDURE — 99214 OFFICE O/P EST MOD 30 MIN: CPT | Mod: S$PBB,,, | Performed by: INTERNAL MEDICINE

## 2023-07-24 PROCEDURE — 82607 VITAMIN B-12: CPT | Performed by: INTERNAL MEDICINE

## 2023-07-24 PROCEDURE — 99214 PR OFFICE/OUTPT VISIT, EST, LEVL IV, 30-39 MIN: ICD-10-PCS | Mod: S$PBB,,, | Performed by: INTERNAL MEDICINE

## 2023-07-24 PROCEDURE — 93010 EKG 12-LEAD: ICD-10-PCS | Mod: S$PBB,,, | Performed by: INTERNAL MEDICINE

## 2023-07-24 PROCEDURE — 93005 ELECTROCARDIOGRAM TRACING: CPT | Mod: PBBFAC | Performed by: INTERNAL MEDICINE

## 2023-07-24 RX ORDER — SIMVASTATIN 20 MG/1
20 TABLET, FILM COATED ORAL NIGHTLY
Qty: 90 TABLET | Refills: 2 | Status: SHIPPED | OUTPATIENT
Start: 2023-07-24 | End: 2023-12-06 | Stop reason: SDUPTHER

## 2023-07-24 RX ORDER — AZELASTINE 1 MG/ML
1 SPRAY, METERED NASAL 2 TIMES DAILY
Qty: 30 ML | Refills: 2 | Status: SHIPPED | OUTPATIENT
Start: 2023-07-24 | End: 2024-07-23

## 2023-07-24 RX ORDER — AMOXICILLIN AND CLAVULANATE POTASSIUM 875; 125 MG/1; MG/1
1 TABLET, FILM COATED ORAL 2 TIMES DAILY
Qty: 14 TABLET | Refills: 0 | Status: SHIPPED | OUTPATIENT
Start: 2023-07-24 | End: 2023-08-14

## 2023-07-24 RX ORDER — METOPROLOL SUCCINATE 100 MG/1
100 TABLET, EXTENDED RELEASE ORAL DAILY
Qty: 90 TABLET | Refills: 2 | Status: SHIPPED | OUTPATIENT
Start: 2023-07-24 | End: 2023-08-08

## 2023-07-24 RX ORDER — METOPROLOL SUCCINATE 100 MG/1
100 TABLET, EXTENDED RELEASE ORAL DAILY
COMMUNITY
End: 2023-07-24 | Stop reason: SDUPTHER

## 2023-07-25 VITALS
DIASTOLIC BLOOD PRESSURE: 84 MMHG | HEART RATE: 100 BPM | BODY MASS INDEX: 25.24 KG/M2 | WEIGHT: 186.06 LBS | TEMPERATURE: 99 F | SYSTOLIC BLOOD PRESSURE: 128 MMHG | RESPIRATION RATE: 14 BRPM | OXYGEN SATURATION: 98 %

## 2023-07-25 RX ORDER — ASCORBIC ACID 250 MG
250 TABLET ORAL DAILY
COMMUNITY

## 2023-07-25 RX ORDER — UBIDECARENONE 30 MG
30 CAPSULE ORAL 3 TIMES DAILY
COMMUNITY

## 2023-07-25 RX ORDER — CHOLECALCIFEROL (VITAMIN D3) 25 MCG
1000 TABLET ORAL DAILY
COMMUNITY

## 2023-07-25 NOTE — PROGRESS NOTES
Subjective:       Patient ID: Oliver Burk Jr. is a 83 y.o. male who presents today for:    Chief Complaint:   Chief Complaint   Patient presents with    Establish Care    [facility]       HPI:  Very pleasant 83-year-old nonsmoking gentleman who is here to establish with [facility].  He has a past medical history most significant for paroxysmal atrial fibrillation we she was fairly asymptomatic when he was diagnosed on annual exam back in October of last year.  He is on anticoagulation and metoprolol and had DCCV last November.  Echocardiogram showed a normal ejection fraction with bilateral atrial enlargement.  Although there are no electrocardiogram rhythm strips except for in January when he had a sleep study he seems to be mostly in atrial fibrillation.  The patient states that does appreciate mild shortness of breath that is short-lived especially when exercising and doing the road machine.  He also has a history as alluded of new onset mild obstructive sleep apnea and the study was prompted by his wife for excessive snoring.  He also has allergic symptoms and did have a head cold/URI a few weeks ago and presently has symptoms of an acute bacterial sinusitis.  He has mild mixed dyslipidemia and is on a statin for many years and back in 2010 he had low-grade prostate cancer and he is status post radiation palate to the prostate with successful treatment.  He also has had 2 lumbar surgeries 1 in 2004 and again in 2019 and has no complaints of back pain or radicular symptoms.  His right knee occasionally becomes weak.  The patient does exercise every day and alternates between doing squats, stretching, rowing, biking and weights.    Last colonoscopy I do not have on record for health maintenance and colon cancer screening.      Review of Systems   Constitutional:  Negative for chills, fever and weight loss.   HENT:  Negative for sore throat.         Postnasal drip for over 10 days with head  cold/URI 2 weeks ago   Eyes:  Negative for blurred vision and double vision.   Respiratory:  Positive for cough and sputum production. Negative for shortness of breath.         Orange yellow sputum production no symptoms in the lower chest   Cardiovascular:  Negative for chest pain and palpitations.   Gastrointestinal:  Negative for constipation, diarrhea, nausea and vomiting.   Genitourinary:  Negative for dysuria and hematuria.   Musculoskeletal:  Negative for joint pain and myalgias.        Describes a intermittent weakness of the right knee but no pain in his legs.   Skin:  Negative for itching and rash.   Neurological:  Negative for sensory change, focal weakness and headaches.   Endo/Heme/Allergies:  Does not bruise/bleed easily.   Psychiatric/Behavioral:  Negative for depression and suicidal ideas.       Medications:  Outpatient Encounter Medications as of 7/24/2023   Medication Sig Note Dispense Refill    ALPRAZolam (XANAX) 0.25 MG tablet Take one tablet hs for sleep 2/23/2023: May take if needed. 90 tablet 0    gabapentin (NEURONTIN) 300 MG capsule Take 1 capsule (300 mg total) by mouth 3 (three) times daily.  270 capsule 3    tamsulosin (FLOMAX) 0.4 mg Cap TAKE ONE CAPSULE BY MOUTH ONCE DAILY AT BEDTIME 2/23/2023: Take as prescribed the PM prior to Surgery.  90 capsule 3    [DISCONTINUED] metoprolol succinate (TOPROL-XL) 100 MG 24 hr tablet Take 100 mg by mouth once daily.       [DISCONTINUED] rivaroxaban (XARELTO) 20 mg Tab Take 1 tablet (20 mg total) by mouth daily with dinner or evening meal. 2/23/2023: Last dose taken Monday 2/20/23 at 2000. 90 tablet 3    [DISCONTINUED] simvastatin (ZOCOR) 20 MG tablet TAKE ONE TABLET BY MOUTH ONCE DAILY (Patient taking differently: Take 20 mg by mouth every evening.) 2/23/2023: Take as prescribed the PM prior to Surgery.  30 tablet 12    amoxicillin-clavulanate 875-125mg (AUGMENTIN) 875-125 mg per tablet Take 1 tablet by mouth 2 (two) times daily.  14 tablet 0     azelastine (ASTELIN) 137 mcg (0.1 %) nasal spray 1 spray (137 mcg total) by Nasal route 2 (two) times daily.  30 mL 2    diclofenac sodium (VOLTAREN) 1 % Gel Apply 1 Tube topically 2 (two) times daily as needed. 2/23/2023: Continue to hold until after Surgery.         metoprolol succinate (TOPROL-XL) 100 MG 24 hr tablet Take 1 tablet (100 mg total) by mouth once daily.  90 tablet 2    rivaroxaban (XARELTO) 20 mg Tab Take 1 tablet (20 mg total) by mouth daily with dinner or evening meal.  90 tablet 2    simvastatin (ZOCOR) 20 MG tablet Take 1 tablet (20 mg total) by mouth every evening.  90 tablet 2    [DISCONTINUED] meloxicam (MOBIC) 15 MG tablet Take 1 tablet by mouth every evening. 2/23/2023: Hold pm before surgery   3    [DISCONTINUED] methylPREDNISolone (MEDROL DOSEPACK) 4 mg tablet Take one line all at once daily  21 each 0    [DISCONTINUED] metoprolol succinate (TOPROL-XL) 100 MG 24 hr tablet Take 1 tablet (100 mg total) by mouth once daily. (Patient taking differently: Take 100 mg by mouth every evening.) 2/23/2023: Take as prescribed the PM prior to Surgery.  90 tablet 3     No facility-administered encounter medications on file as of 7/24/2023.       Allergies:  Review of patient's allergies indicates:  No Known Allergies    Health Maintenance:  Immunization History   Administered Date(s) Administered    COVID-19, MRNA, LN-S, PF (Pfizer) (Gray Cap) 07/27/2022    COVID-19, MRNA, LN-S, PF (Pfizer) (Purple Cap) 01/09/2021, 01/30/2021, 08/15/2021    Hepatitis A, Adult 12/09/2008    IPV 12/09/2008    Influenza (FLUAD) - Quadrivalent - Adjuvanted - PF *Preferred* (65+) 11/28/2021, 11/19/2022    Influenza - High Dose - PF (65 years and older) 10/12/2016    Influenza - Quadrivalent - High Dose - PF (65 years and older) 11/15/2020    Pneumococcal Conjugate - 13 Valent 07/24/2023    Typhoid - ViCPs 12/09/2008      Health Maintenance   Topic Date Due    TETANUS VACCINE  Never done    Lipid Panel  07/24/2028           Objective:      Vital Signs  Temp: 99 °F (37.2 °C)  Pulse: 100  Resp: 14  SpO2: 98 %  BP: 128/84  Height and Weight  Weight: 84.4 kg (186 lb 1.1 oz)]      Physical Exam  Constitutional:       General: He is not in acute distress.     Appearance: Normal appearance.   HENT:      Head: Normocephalic and atraumatic.      Salivary Glands: Right salivary gland is diffusely enlarged. Right salivary gland is not tender. Left salivary gland is diffusely enlarged. Left salivary gland is not tender.      Comments: Erythema and cobblestoning no drip visualized on exam  Eyes:      Conjunctiva/sclera: Conjunctivae normal.   Cardiovascular:      Rate and Rhythm: Normal rate. Rhythm irregularly irregular.      Heart sounds: Normal heart sounds.   Pulmonary:      Effort: Pulmonary effort is normal.      Breath sounds: Normal breath sounds.   Abdominal:      Palpations: Abdomen is soft.   Musculoskeletal:         General: Normal range of motion.      Cervical back: Normal range of motion and neck supple.      Right lower leg: No edema.      Left lower leg: No edema.   Skin:     General: Skin is warm and dry.   Neurological:      General: No focal deficit present.      Mental Status: He is alert.   Psychiatric:         Mood and Affect: Mood normal.         Behavior: Behavior normal.         Thought Content: Thought content normal.         Judgment: Judgment normal.      Lab Results   Component Value Date    WBC 9.21 07/24/2023    HGB 15.6 07/24/2023    HCT 47.5 07/24/2023     07/24/2023    CHOL 116 (L) 07/24/2023    TRIG 49 07/24/2023    HDL 49 07/24/2023    ALT 15 07/24/2023    AST 15 07/24/2023     07/24/2023    K 4.4 07/24/2023     07/24/2023    CREATININE 0.8 07/24/2023    BUN 11 07/24/2023    CO2 26 07/24/2023    TSH 1.260 07/24/2023    PSA <0.01 07/24/2023    INR 1.2 10/17/2022    HGBA1C 5.9 (H) 07/24/2023     Assessment/plan:     Oliver Burk JrIsaías is a 83 y.o.male here to establish with [facility]  and for an annual checkup    Paroxysmal atrial fibrillation  -     SCHEDULED EKG 12-LEAD (to Muse); Future  -     metoprolol succinate (TOPROL-XL) 100 MG 24 hr tablet; Take 1 tablet (100 mg total) by mouth once daily.  Dispense: 90 tablet; Refill: 2  -     rivaroxaban (XARELTO) 20 mg Tab; Take 1 tablet (20 mg total) by mouth daily with dinner or evening meal.  Dispense: 90 tablet; Refill: 2    Dyslipidemia, goal LDL below 100  -     simvastatin (ZOCOR) 20 MG tablet; Take 1 tablet (20 mg total) by mouth every evening.  Dispense: 90 tablet; Refill: 2    Elevated hemoglobin A1c- 5.9 percent    This is new in his only desk small above normal.  Watch concentrated sugars which you already doing and will recheck in 6 months    Non-seasonal allergic rhinitis due to pollen  Comments:  hold nightly benedryl as not good for atrial fib or BPH and nocturia symptoms   Orders:  -     azelastine (ASTELIN) 137 mcg (0.1 %) nasal spray; 1 spray (137 mcg total) by Nasal route 2 (two) times daily.  Dispense: 30 mL; Refill: 2    Acute bacterial sinusitis   Can use Astelin NS as well and over-the-counter Mucinex  -     amoxicillin-clavulanate 875-125mg (AUGMENTIN) 875-125 mg per tablet; Take 1 tablet by mouth 2 (two) times daily.  Dispense: 14 tablet; Refill: 0    History of prostate cancer-  diagnosed 2010 low [place] score; status post radiation seeds  Comments:  Noted PSA still very low    Benign prostatic hyperplasia with lower urinary tract symptoms, symptom details unspecified  Comments:  2 times nocturia but gets back to sleep  Continue with Flomax for now    ROMEO (obstructive sleep apnea)- mild    Continue with wearing CPAP machine at night    Other orders  -     (In Office Administered) Pneumococcal Conjugate Vaccine (13 Valent) (IM)    Mild macrocytosis   Methylmalonic acid pending    Future Appointments   Date Time Provider Department Center   8/9/2023  8:00 AM Evin Mario PT [facility]   9/13/2023  1:00 PM Scott  REYNOLD Menjivar MD [facility]       Maria Isabel Osborne MD  [facility]

## 2023-07-27 LAB — METHYLMALONATE SERPL-SCNC: 0.19 UMOL/L

## 2023-07-28 RX ORDER — ESZOPICLONE 1 MG/1
1 TABLET, FILM COATED ORAL NIGHTLY
Qty: 30 TABLET | Refills: 1 | Status: SHIPPED | OUTPATIENT
Start: 2023-07-28 | End: 2023-08-03

## 2023-08-03 RX ORDER — ZOLPIDEM TARTRATE 5 MG/1
5 TABLET ORAL NIGHTLY PRN
Qty: 30 TABLET | Refills: 2 | Status: SHIPPED | OUTPATIENT
Start: 2023-08-03 | End: 2024-02-01

## 2023-08-08 ENCOUNTER — TELEPHONE (OUTPATIENT)
Dept: INTERNAL MEDICINE | Facility: CLINIC | Age: 83
End: 2023-08-08
Payer: MEDICARE

## 2023-08-08 DIAGNOSIS — I48.20 CHRONIC ATRIAL FIBRILLATION: Primary | ICD-10-CM

## 2023-08-08 RX ORDER — METOPROLOL SUCCINATE 50 MG/1
50 TABLET, EXTENDED RELEASE ORAL 2 TIMES DAILY
Qty: 60 TABLET | Refills: 1 | Status: SHIPPED | OUTPATIENT
Start: 2023-08-08 | End: 2023-10-08

## 2023-08-08 RX ORDER — GABAPENTIN 100 MG/1
100 CAPSULE ORAL 3 TIMES DAILY
Qty: 90 CAPSULE | Refills: 1 | Status: SHIPPED | OUTPATIENT
Start: 2023-08-08 | End: 2023-08-14

## 2023-08-08 NOTE — TELEPHONE ENCOUNTER
No care due was identified.  Health [facility] Embedded Care Due Messages. Reference number: [id number].   8/08/2023 4:01:08 PM CDT

## 2023-08-08 NOTE — TELEPHONE ENCOUNTER
[facility] pt needs holter monitor placed. Order in     If it is in 2 weeks let us get him in with me that day too

## 2023-08-09 ENCOUNTER — CLINICAL SUPPORT (OUTPATIENT)
Dept: REHABILITATION | Facility: OTHER | Age: 83
End: 2023-08-09
Attending: NEUROLOGICAL SURGERY
Payer: MEDICARE

## 2023-08-09 DIAGNOSIS — M54.9 BACK PAIN, UNSPECIFIED BACK LOCATION, UNSPECIFIED BACK PAIN LATERALITY, UNSPECIFIED CHRONICITY: ICD-10-CM

## 2023-08-09 DIAGNOSIS — M54.16 LUMBAR RADICULOPATHY: ICD-10-CM

## 2023-08-09 DIAGNOSIS — R68.89 DECREASED ACTIVITY TOLERANCE: ICD-10-CM

## 2023-08-09 DIAGNOSIS — R29.898 DECREASED STRENGTH OF TRUNK AND BACK: ICD-10-CM

## 2023-08-09 PROCEDURE — 97112 NEUROMUSCULAR REEDUCATION: CPT

## 2023-08-09 PROCEDURE — 97530 THERAPEUTIC ACTIVITIES: CPT

## 2023-08-09 PROCEDURE — 97162 PT EVAL MOD COMPLEX 30 MIN: CPT

## 2023-08-09 RX ORDER — METOPROLOL SUCCINATE 50 MG/1
50 TABLET, EXTENDED RELEASE ORAL 2 TIMES DAILY
Qty: 180 TABLET | OUTPATIENT
Start: 2023-08-09

## 2023-08-09 NOTE — TELEPHONE ENCOUNTER
Refill Decision Note  Quick DC. Request already responded to by other means (e.g. phone or fax)    Oliver Burk  is requesting a refill authorization.  Brief Assessment and Rationale for Refill:  Quick Discontinue     Medication Therapy Plan:       Medication Reconciliation Completed: No   Comments:           Note composed:2:57 PM 08/09/2023

## 2023-08-09 NOTE — PLAN OF CARE
[facility]  Physical Therapy Lumbar Evaluation      Name: Oliver Burk Jr.  Clinic Number: [id number]    Therapy Diagnosis:   Encounter Diagnoses   Name Primary?    Back pain, unspecified back location, unspecified back pain laterality, unspecified chronicity     Lumbar radiculopathy      Physician: Scott Menjivar MD    Physician Orders: PT Eval and Treat  Medical Diagnosis from Referral:   1. Back pain, unspecified back location, unspecified back pain laterality, unspecified chronicity    2. Lumbar radiculopathy      Evaluation Date: 8/9/2023  Authorization Period Expiration: 06/06/2024   Plan of Care Expiration: 11/9/2023  Reassessment Due: 9/9/2023  Visit # / Visits authorized: 1/1    Time In: 8:05  Time Out: 9:10  Total Billable Time: 65 minutes  INSURANCE and OUTCOMES: Fee for Service with FOTO Outcomes 1/3    Precautions: standard, HTN, and arthritis, A-Fib, Hx CA    Pattern of pain determined: 1PEP    Subjective     Date of onset/History of current condition: Oliver reports No back pain, spinal stenosis, back sx in 2004 spinal stenosis, and another in 2019 for a clean up. He has L knee discomfort that is on its own cycle and every once in a while it will give out. He will stand up and pull the knee up to the buttocks and that helps, Walking distances is difficult. When he goes to stand all the weight is on the heels. He has been having the discomfort in the knee for about 5-6 years. Can walk about a couple blocks before he feels like he needs to change positions. Has never had any radicular sx.     Medical History:   Past Medical History:   Diagnosis Date    Arthritis     Atrial fibrillation     Basal cell carcinoma     Cancer     prostate    Hyperlipidemia     Hypertension     on medication       Surgical History:   Oliver Burk Jr.  has a past surgical history that includes Lumbar laminectomy (2004); Prostate surgery (2010); Cataract extraction (Right,  01/14/2019); Cataract extraction w/  intraocular lens implant (Right, 01/14/2019); Cataract extraction w/  intraocular lens implant (Left, 01/28/2019); Eye surgery; Appendectomy; Treatment of cardiac arrhythmia (N/A, 10/17/2022); and repair, hernia, inguinal, without history of prior repair, age 5 years or older (Left, 02/24/2023).    Medications:   Oliver has a current medication list which includes the following prescription(s): alprazolam, amoxicillin-clavulanate 875-125mg, ascorbic acid (vitamin c), azelastine, co-enzyme q-10, diclofenac sodium, gabapentin, lactobac no.41/bifidobact no.7, metoprolol succinate, rivaroxaban, simvastatin, tamsulosin, vitamin d, and zolpidem.    Allergies:   Review of patient's allergies indicates:  No Known Allergies     Imaging: MRI Previous (5/25/23)  Degenerative disc disease present. R>L foramen opening at L5/S1. Degenerative scoliosis.  Postoperative changes of prior decompression without complications seen.    Prior Therapy: Did therapies after each surgery for  about 2-3 months  Prior Treatment: 2 spinal stenosis, hernia repair  Social History:  lives with their spouse  Occupation:   Leisure: Read, cards with wife, dog      Prior Level of Function: I with ADL  Current Level of Function: Requires support for LE dressing  DME owned/used: None  Gym Membership: Yes, usually goes about 5x a week    Pain:  Current 0/10, worst /10, best 0/10   Location:   L knee, lateral, but when it gets tight he can feel the while thing  Description: Starts with tightness, feels like it is going to give out  Aggravating Factors: walking, can no longer play golf  Easing Factors:  Hitting the knee, stretches  Disturbed Sleep: none    Pattern of pain questions:  1.  Where is your pain the worst? LE  2.  Is your pain constant or intermittent? intermittent  3.  Does bending forward make your typical pain worse? no  4.  Since the start of your back pain, has there been a change in your bowel or  bladder? no  5.  What can't you do now that you use to be able to do? Walking long distance    Pts goals: He able to walk longer without pain and be able to stand on toes.     Red Flag Screening:   Cough/Sneeze Strain: (--)  Bladder/Bowel: (--)  Falls: (--)  Night pain: (--)  Unexplained weight loss: (--)  General health: good    Objective      Postural examination/scapula alignment: Rounded shoulder, L shoulder elevated  Joint integrity: Hard end feel  Skin integrity:WNL   Edema: None  Correction of posture: better with lumbar roll  Sitting: fair  Standing: usteady standing, unable to put weight on the toes    GAIT:  Assistive Device used: none  Level of Assistance: independent  Patient displays the following gait deviations: unsteady gait, wide ZION    MOVEMENT LOSS - Lumbar   Norms ROM Loss Initial   Flexion Fingers touch toes, sacral angle >/= 70 deg, uniform spinal curvature, posterior weight shift  NT   Extension ASIS surpasses toes, spine of scapulae surpasses heels, uniform spinal curve moderate loss   Side glide Right  NT   Side glide Left  NT   Rotation Right PT observes contralateral shoulder NT   Rotation Left PT observes contralateral shoulder NT     Lower Extremity Strength  Right LE  Left LE    Hip flexion: 3+/5 Hip flexion: 3+/5   Hip External Rotation 4+/5 Hip External Rotation 4+/5   Hip Internal rotation   4+/5 Hip Internal rotation 3+/5   Ankle dorsiflexion: 3/5 Ankle dorsiflexion: 2/5       Special Tests:   Test Name  Test Result   Neural Tension Test (--)   Crossed Straight Leg Raise (--)     NEUROLOGICAL SCREENING:     Sensory deficits:  Intact to light touch    REPEATED TEST MOVEMENTS:    Baseline symptoms:  Repeated Flexion in Standing NT   Repeated Extension in Standing no effect   Repeated Flexion in lying NT   Repeated Extension in lying  better       Baseline Isometric Testing on Med X equipment: Testing administered by PT    Date of testin2023  ROM 0-48 deg   Max Peak Torque 180     Min Peak Torque 54    Flex/Ext Ratio 3.33:1   % below normative data 26     Intake/Outcome for FOTO Lumbar Surveys    Therapist reviewed FOTO scores for Oliver Burk Jr. on 8/9/2023  FOTO documents entered into [employer] - see Media section.    Intake Score: 22%   Visit 5 Score:   Visit 10 Score:   Discharge Score:   Goal Score: 10%       Treatment     Treatment Time In: 8:45  Treatment Time Out: 9:10  Total Treatment time separate from Evaluation: 25 minutes        Oliver received neuromuscular education to engage spinal musculature correctly for motor control and engagement of musculature for 15 minutes including the MedX exercise component and practice and standard testing. MedX dynamic exercise and baseline isometric test performed with instructions to guide the patient safely through the testing procedure. Patient instructed to perform isometric test correctly and safely while building to an optimal force with a pain-free effort. Patient also instructed that he should feel support/pressure from MedX restraints but no pain/discomfort. Patient demonstrated appropriate understanding of information.       [facility] 8/9/2023   Visit Number 1   VAS Pain Rating 0   Extension in Lying 20   Extension in Standing 10   Lumbar Extension Seat Pad 1   Femur Restraint 6   Top Dead Center 24   Counterweight 185   Lumbar Flexion 48   Lumbar Extension 0   Lumbar Peak Torque 180   Min Torque 54   Test Percent Below Normative Data 26          Therapeutic Education/Activity provided for 10 minutes:   - Patient was given an [facility] Visit 1 handout which discusses the following:  - what to expect in therapy  - an overview of the program, including health coaching and wellness  - importance of spinal hygiene, proper posture, lifting mechanics, sleep quality, and nutrition/hydration   - Ryan roll trialed, recommended, and purchase information was provided.  - Patient received a handout regarding anticipated  muscular soreness following the isometric test and strategies for management were reviewed with patient including stretching, using ice and scheduled rest.   - Patient received verbal education on the following:   - [facility] program   - purpose of the isometric test  - safe progression of lumbar strengthening, wellness approach, and systemic strengthening.   - safe usage of MedX machine and testing protocols.        Written Home Exercises Provided: yes.    HEP AS FOLLOWS:  Extension in Lying 8x10  Get lumbar roll    Exercises were reviewed and Oliver was able to demonstrate them prior to the end of the session.  Oliver demonstrated good  understanding of the education provided.     See EMR under Patient Instructions for exercises provided 8/9/2023.    Assessment     Oliver is a 83 y.o. male referred to [facility] with a medical diagnosis of Back pain, unspecified back location, unspecified back pain laterality, unspecified chronicity, Lumbar radiculopathy . Pt presents with Primary complaint of intermittent knee pain, inability to put weight on the whole foot, and difficulty walking long distances. He denies back pain and had a very active lifestyle playing sports and in the  jumping out of planes. With standing posture he has unsteady standing tolerance that requires him to flex the knees and only put weight through the heels. Did not complete standing ROM due to unsteady standing tolerance. Seated Ankle DF weakness bilaterally L>R that was better with lumbar roll. Decreased hip flexor strength in supine and hook lying ankle DF at baseline, increased strength baselines after EIL and EIL with patient overpressure as well as better standing tolerance with reports of whole foot weight bearing. Attempted EIS however had decrease in seated strength baseline. Completed lumbar medx strength tested with most limitation in 0 deg extension and 26% below age averages. Pt given EIL at home x10 throughout  the day.    Pain Pattern: 1 PEP       Pt prognosis is Excellent.     Pt will benefit from skilled outpatient Physical Therapy to address the deficits stated above and in the chart below, to provide pt/family education, and to maximize pt's level of independence. Based on the above history and physical examination an active physical therapy program is recommended.      Plan of care discussed with patient: Yes  Pt's spiritual, cultural and educational needs considered and patient is agreeable to the plan of care and goals as stated below:     Anticipated Barriers for therapy: unsteady gait    PT Evaluation Completed? Yes    Medical necessity is demonstrated by the following problem list:    History  Co-morbidities and personal factors that may impact the plan of care [] LOW: no personal factors / co-morbidities  [] MODERATE: 1-2 personal factors / co-morbidities  [x] HIGH: 3+ personal factors / co-morbidities    Moderate / High Support Documentation:   Co-morbidities affecting plan of care: Hypertension, Hyperlipidemia, Cancer, Atrial fibrillation, Arthritis    Personal Factors:   lifestyle     Examination  Body Structures and Functions, activity limitations and participation restrictions that may impact the plan of care [x] LOW: addressing 1-2 elements  [] MODERATE: 3+ elements  [] HIGH: 4+ elements (please support below)    Moderate / High Support Documentation: walking     Clinical Presentation [] LOW: stable  [x] MODERATE: Evolving  [] HIGH: Unstable     Decision Making/ Complexity Score: moderate         GOALS: Pt is in agreement with the following goals.    Short term goals:  6 weeks or 10 visits   - Pt will demonstrate increased lumbar ROM by at least 3 degrees from the initial ROM value with improvements noted in functional ROM and ability to perform ADLs. Appropriate and Ongoing  - Pt will demonstrate increased MedX average isometric strength value by 15% from initial test resulting in improved ability to  perform bending, lifting, and carrying activities safely, confidently. Appropriate and Ongoing  - Pt will report better balance in static standing . Appropriate and Ongoing  - Pt able to perform HEP correctly with minimal cueing or supervision from therapist to encourage independent management of symptoms. Appropriate and Ongoing    Long term goals: 10 weeks or 20 visits   - Pt will demonstrate increased lumbar ROM by at least 6 degrees from initial ROM value, resulting in improved ability to perform functional forward bending while standing and sitting. Appropriate and Ongoing  - Pt will demonstrate increased MedX average isometric strength value by 30% from initial test resulting in improved ability to perform bending, lifting, and carrying activities safely and confidently. Appropriate and Ongoing  - Pt to demonstrate ability to independently control and reduce their pain through posture positioning and mechanical movements throughout a typical day. Appropriate and Ongoing  - Pt will demonstrate reduced pain and improved functional outcomes as reported on the Oswestry Disability Index by reaching a score of 10 or less in order to demonstrate subjective improvement in pt's condition.   Appropriate and Ongoing  - Pt will demonstrate independence with the HEP at discharge. Appropriate and Ongoing  - Pt able to walk up to 30 min without increase in back or leg pain/discomfort (patient goal) Appropriate and Ongoing    Plan     Outpatient physical therapy 2x week for 10 weeks or 20 visits to include the following:   - Patient education  - Therapeutic exercise  - Manual therapy  - Performance testing   - Neuromuscular Re-education  - Therapeutic activity   - Modalities    Pt may be seen by PTA as part of the rehabilitation team.     Therapist: Evin Mario, PT  8/9/2023

## 2023-08-10 ENCOUNTER — CLINICAL SUPPORT (OUTPATIENT)
Dept: CARDIOLOGY | Facility: HOSPITAL | Age: 83
End: 2023-08-10
Attending: INTERNAL MEDICINE
Payer: MEDICARE

## 2023-08-10 DIAGNOSIS — I48.20 CHRONIC ATRIAL FIBRILLATION: ICD-10-CM

## 2023-08-10 PROCEDURE — 93226 XTRNL ECG REC<48 HR SCAN A/R: CPT

## 2023-08-10 PROCEDURE — 93227 XTRNL ECG REC<48 HR R&I: CPT | Mod: ,,, | Performed by: INTERNAL MEDICINE

## 2023-08-10 PROCEDURE — 93227 HOLTER MONITOR - 48 HOUR (CUPID ONLY): ICD-10-PCS | Mod: ,,, | Performed by: INTERNAL MEDICINE

## 2023-08-14 ENCOUNTER — TELEPHONE (OUTPATIENT)
Dept: ELECTROPHYSIOLOGY | Facility: CLINIC | Age: 83
End: 2023-08-14
Payer: MEDICARE

## 2023-08-14 ENCOUNTER — OFFICE VISIT (OUTPATIENT)
Dept: INTERNAL MEDICINE | Facility: CLINIC | Age: 83
End: 2023-08-14
Payer: MEDICARE

## 2023-08-14 VITALS
OXYGEN SATURATION: 95 % | DIASTOLIC BLOOD PRESSURE: 56 MMHG | SYSTOLIC BLOOD PRESSURE: 116 MMHG | BODY MASS INDEX: 24.97 KG/M2 | WEIGHT: 184.06 LBS

## 2023-08-14 DIAGNOSIS — R53.83 FATIGUE, UNSPECIFIED TYPE: ICD-10-CM

## 2023-08-14 DIAGNOSIS — R73.09 ELEVATED HEMOGLOBIN A1C: ICD-10-CM

## 2023-08-14 DIAGNOSIS — Z85.46 HISTORY OF PROSTATE CANCER: ICD-10-CM

## 2023-08-14 DIAGNOSIS — E78.5 DYSLIPIDEMIA, GOAL LDL BELOW 100: ICD-10-CM

## 2023-08-14 DIAGNOSIS — F51.01 PRIMARY INSOMNIA: ICD-10-CM

## 2023-08-14 DIAGNOSIS — I48.19 PERSISTENT ATRIAL FIBRILLATION: ICD-10-CM

## 2023-08-14 DIAGNOSIS — N40.1 BENIGN PROSTATIC HYPERPLASIA WITH LOWER URINARY TRACT SYMPTOMS, SYMPTOM DETAILS UNSPECIFIED: ICD-10-CM

## 2023-08-14 DIAGNOSIS — G47.33 OSA (OBSTRUCTIVE SLEEP APNEA): Primary | ICD-10-CM

## 2023-08-14 PROCEDURE — 99214 PR OFFICE/OUTPT VISIT, EST, LEVL IV, 30-39 MIN: ICD-10-PCS | Mod: S$PBB,,, | Performed by: INTERNAL MEDICINE

## 2023-08-14 PROCEDURE — 99214 OFFICE O/P EST MOD 30 MIN: CPT | Mod: S$PBB,,, | Performed by: INTERNAL MEDICINE

## 2023-08-14 PROCEDURE — 99999 PR PBB SHADOW E&M-EST. PATIENT-LVL III: CPT | Mod: PBBFAC,,, | Performed by: INTERNAL MEDICINE

## 2023-08-14 PROCEDURE — 99999 PR PBB SHADOW E&M-EST. PATIENT-LVL III: ICD-10-PCS | Mod: PBBFAC,,, | Performed by: INTERNAL MEDICINE

## 2023-08-14 PROCEDURE — 99213 OFFICE O/P EST LOW 20 MIN: CPT | Mod: PBBFAC | Performed by: INTERNAL MEDICINE

## 2023-08-14 NOTE — TELEPHONE ENCOUNTER
----- Message from Shante Lewis MA sent at 8/14/2023 12:24 PM CDT -----  The phone team transferred him to me but it looks like [phone] might be it according to the log. I'm not sure he said to just have you or Dr. Barriga call because another doctor changed the dosage of something.  ----- Message -----  From: Lilliana Ling RN  Sent: 8/14/2023  12:10 PM CDT  To: Shante Lewis MA    Do you have a call back number for Dr Patterson? Do you know which medication is it about?  Lilliana Newberry   ----- Message -----  From: Shante Lewis MA  Sent: 8/14/2023  11:57 AM CDT  To: Mikel Barriga MD; VARINDER Barrios Dr. called on behalf of there pt requesting that the pt be called back due to a medication concern.

## 2023-08-14 NOTE — TELEPHONE ENCOUNTER
Spoke with Dr Patterson (friend of patient), he wanted our input on the patients medication change made by his new [facility] doctor, he reports pt has been having some fatigue and his metoprolol was changed from 100mg daily to 50mg BID, asked if we could call to discuss with the pt, spoke with pt, reports he will try the 50mg BID dosing starting tomorrow, informed pt that is ok to try that way and if has no improvement in his fatigue to notify us

## 2023-08-15 ENCOUNTER — TELEPHONE (OUTPATIENT)
Dept: PULMONOLOGY | Facility: CLINIC | Age: 83
End: 2023-08-15
Payer: MEDICARE

## 2023-08-15 ENCOUNTER — PATIENT MESSAGE (OUTPATIENT)
Dept: INTERNAL MEDICINE | Facility: CLINIC | Age: 83
End: 2023-08-15
Payer: MEDICARE

## 2023-08-15 NOTE — PROGRESS NOTES
Subjective:       Patient ID: Oliver Burk Jr. is a 83 y.o. male who presents today for:    Chief Complaint:   Chief Complaint   Patient presents with    Fatigue    Atrial Fibrillation       HPI:  Very pleasant 83-year-old nonsmoking gentleman who is working full-time as a  and is here for excessive daytime fatigue going on for the past couple of months.  He is accompanied by his wife who admits that he is getting inadequate sleep which is multifactorial due to waking up twice a day night to have to urinate but more importantly is not able to get to sleep well.  He is taking low-dose alprazolam at 0.25 mg and adding some melatonin to that but still wakes up intermittently and has a difficult time getting back to sleep.  Most importantly as of October of this past year he went into atrial fibrillation.  He was fairly asymptomatic.  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  The patient however wakes up tired and has to return home about 4:00 p.m..  He questions whether this is due to his age or due to the new diagnosis of mild obstructive sleep apnea for which he is had 2 machines now and has been struggling with keeping the machines on at night.  Zolpidem was prescribed at 5 mg with the intention of having Lunesta but it was not covered by his medical insurance.  The patient has used zolpidem twice in the past couple of weeks.  He is uncertain if this is helping.  He did get a summer cold and this maybe when the fatigue started.  He tested negative for COVID at that time.    Review of Systems   Constitutional:  Positive for malaise/fatigue. Negative for chills, fever and weight loss.   HENT:  Negative for sore throat.    Eyes:  Negative for blurred vision and double vision.   Respiratory:  Negative for cough and shortness of breath.    Cardiovascular:  Negative for chest pain and palpitations.   Gastrointestinal:  Negative for  constipation, diarrhea, nausea and vomiting.   Genitourinary:  Negative for dysuria and hematuria.   Musculoskeletal:  Negative for joint pain and myalgias.   Skin:  Negative for itching and rash.   Neurological:  Negative for sensory change, focal weakness and headaches.   Endo/Heme/Allergies:  Does not bruise/bleed easily.   Psychiatric/Behavioral:  Negative for depression and suicidal ideas.         Medications:  Outpatient Encounter Medications as of 8/14/2023   Medication Sig Note Dispense Refill    metoprolol succinate (TOPROL-XL) 50 MG 24 hr tablet Take 1 tablet (50 mg total) by mouth 2 (two) times daily.  60 tablet 1    rivaroxaban (XARELTO) 20 mg Tab Take 1 tablet (20 mg total) by mouth daily with dinner or evening meal.  90 tablet 2    simvastatin (ZOCOR) 20 MG tablet Take 1 tablet (20 mg total) by mouth every evening.  90 tablet 2    tamsulosin (FLOMAX) 0.4 mg Cap TAKE ONE CAPSULE BY MOUTH ONCE DAILY AT BEDTIME 2/23/2023: Take as prescribed the PM prior to Surgery.  90 capsule 3    [DISCONTINUED] gabapentin (NEURONTIN) 100 MG capsule Take 1 capsule (100 mg total) by mouth 3 (three) times daily.  90 capsule 1    ALPRAZolam (XANAX) 0.25 MG tablet Take one tablet hs for sleep 2/23/2023: May take if needed. 90 tablet 0    ascorbic acid, vitamin C, (VITAMIN C) 250 MG tablet Take 250 mg by mouth once daily.       azelastine (ASTELIN) 137 mcg (0.1 %) nasal spray 1 spray (137 mcg total) by Nasal route 2 (two) times daily.  30 mL 2    co-enzyme Q-10 30 mg capsule Take 30 mg by mouth 3 (three) times daily.       diclofenac sodium (VOLTAREN) 1 % Gel Apply 1 Tube topically 2 (two) times daily as needed. 2/23/2023: Continue to hold until after Surgery.         Lactobac no.41/Bifidobact no.7 (PROBIOTIC-10 ORAL) Take by mouth.       vitamin D (VITAMIN D3) 1000 units Tab Take 1,000 Units by mouth once daily.       zolpidem (AMBIEN) 5 MG Tab Take 1 tablet (5 mg total) by mouth nightly as needed (Insomnia).  30 tablet 2     [DISCONTINUED] amoxicillin-clavulanate 875-125mg (AUGMENTIN) 875-125 mg per tablet Take 1 tablet by mouth 2 (two) times daily.  14 tablet 0     No facility-administered encounter medications on file as of 8/14/2023.       Allergies:  Review of patient's allergies indicates:  No Known Allergies    Health Maintenance:  Immunization History   Administered Date(s) Administered    COVID-19, MRNA, LN-S, PF (Pfizer) (Gray Cap) 07/27/2022    COVID-19, MRNA, LN-S, PF (Pfizer) (Purple Cap) 01/09/2021, 01/30/2021, 08/15/2021    Hepatitis A, Adult 12/09/2008    IPV 12/09/2008    Influenza (FLUAD) - Quadrivalent - Adjuvanted - PF *Preferred* (65+) 11/28/2021, 11/19/2022    Influenza - High Dose - PF (65 years and older) 10/12/2016    Influenza - Quadrivalent - High Dose - PF (65 years and older) 11/15/2020    Pneumococcal Conjugate - 13 Valent 07/24/2023    Typhoid - ViCPs 12/09/2008      Health Maintenance   Topic Date Due    TETANUS VACCINE  Never done    Shingles Vaccine (1 of 2) Never done    Lipid Panel  07/24/2028          Objective:      Vital Signs  SpO2: 95 %  BP: (!) 116/56  Height and Weight  Weight: 83.5 kg (184 lb 1.4 oz)]    Physical Exam  Constitutional:       General: He is not in acute distress.     Appearance: Normal appearance.   HENT:      Head: Normocephalic and atraumatic.   Eyes:      Conjunctiva/sclera: Conjunctivae normal.   Cardiovascular:      Rate and Rhythm: Normal rate and regular rhythm.      Heart sounds: Normal heart sounds.   Pulmonary:      Effort: Pulmonary effort is normal.      Breath sounds: Normal breath sounds.   Abdominal:      Palpations: Abdomen is soft.   Musculoskeletal:         General: Normal range of motion.      Cervical back: Normal range of motion and neck supple.   Skin:     General: Skin is warm and dry.   Neurological:      General: No focal deficit present.      Mental Status: He is alert.   Psychiatric:         Mood and Affect: Mood normal.         Behavior: Behavior  normal.         Thought Content: Thought content normal.         Judgment: Judgment normal.        Lab Results   Component Value Date    WBC 9.21 07/24/2023    HGB 15.6 07/24/2023    HCT 47.5 07/24/2023     07/24/2023    CHOL 116 (L) 07/24/2023    TRIG 49 07/24/2023    HDL 49 07/24/2023    ALT 15 07/24/2023    AST 15 07/24/2023     07/24/2023    K 4.4 07/24/2023     07/24/2023    CREATININE 0.8 07/24/2023    BUN 11 07/24/2023    CO2 26 07/24/2023    TSH 1.260 07/24/2023    PSA <0.01 07/24/2023    INR 1.2 10/17/2022    HGBA1C 5.9 (H) 07/24/2023     Assessment/plan:     Oliver Burk Jr. is a 83 y.o.male with:    Daytime fatigue   New as of the past couple of months.  This is multifactorial due to obstructive sleep apnea and having to deal with the CPAP mask at night, nocturia with 2 times having to get up in the evening.  But most importantly likely chronic atrial fibrillation.  Patient was cardioverted last October and did go into normal sinus rhythm but it is unknown when he went back into atrial fibrillation.  Echocardiogram showed dilated atrium bilaterally but not quantified.  Patient did just turned in a Holter monitor for 2 days these results are pending.  He is on a blood thinner.      Persistent atrial fibrillation-as per above  -     Echo; Future-followed by appointment with your electrophysiologist Linus Church   Separate Toprol from 100 mg daily to 50 mg twice a day to see if this helps.  Continue Xarelto.  Await results from Holter monitor and repeat echocardiogram as well as a follow-up with electrophysiology Cardiology.    ROMEO (obstructive sleep apnea)- mild    Need to work with the sleep apnea staff to be able to tolerate CPAP  .  Primary insomnia   Would hold alprazolam and take zolpidem 5 mg to start to get you asleep and if you wake up during the middle of the night take another half.  Sleep maintenance is important and if your still getting up twice a night to urinate, I will  prescribe a low-dose urine urgency medication to help you not urinate at night.  Sleep hygiene/quality is very important to avoid daytime fatigue.    Benign prostatic hyperplasia with lower urinary tract symptoms- with nocturuia/  History of prostate cancer-  diagnosed 2010 low Gerardo score; status post radiation seeds   Has seen Urology in the past now this monitoring PSA.  Continue Flomax    Elevated hemoglobin A1c   Patient avoiding concentrated sugars and will recheck in November.    Dyslipidemia, goal LDL below 100   Continue with simvastatin 20 mg daily.            Future Appointments   Date Time Provider Department Center   8/16/2023 10:30 AM Petr Simons, PT [facility]   9/6/2023 10:30 AM CARDIOLOGY, TESTS [facility]   9/8/2023 11:00 AM Mikel Barriga MD [facility]   9/11/2023  2:00 PM Ascencion Solis, PT [facility]   9/13/2023  1:00 PM Scott Menjivar MD [facility]   9/15/2023  7:00 AM Evin Mario, PT [facility]   9/19/2023  7:00 AM Evin Mario, PT [facility]   9/21/2023  7:00 AM Evin Mario, PT [facility]   9/22/2023  7:00 AM Evin Mario, PT [facility]   9/26/2023  7:00 AM Evin Mario, PT [facility]   9/29/2023  7:00 AM Petr Simons, PT [facility]       Maria Isabel Osborne MD  [facility]

## 2023-08-15 NOTE — TELEPHONE ENCOUNTER
----- Message from Savannah Alarcon sent at 8/15/2023  9:17 AM CDT -----  Regarding: patient call back  Type: Patient Call Back    Who called: Self     What is the request in detail: Please call to schedule apt for patient. He said that he is leaving next Tuesday to go [place].    Can the clinic reply by JENNIFERNER? No     Would the patient rather a call back or a response via [facility]? Call     Best call back number: .[phone]

## 2023-08-16 ENCOUNTER — CLINICAL SUPPORT (OUTPATIENT)
Dept: REHABILITATION | Facility: OTHER | Age: 83
End: 2023-08-16
Payer: MEDICARE

## 2023-08-16 DIAGNOSIS — R68.89 DECREASED ACTIVITY TOLERANCE: Primary | ICD-10-CM

## 2023-08-16 DIAGNOSIS — R29.898 DECREASED STRENGTH OF TRUNK AND BACK: ICD-10-CM

## 2023-08-16 LAB
OHS CV EVENT MONITOR DAY: 0
OHS CV HOLTER LENGTH DECIMAL HOURS: 48
OHS CV HOLTER LENGTH HOURS: 48
OHS CV HOLTER LENGTH MINUTES: 0

## 2023-08-16 PROCEDURE — 97110 THERAPEUTIC EXERCISES: CPT

## 2023-08-16 PROCEDURE — 97112 NEUROMUSCULAR REEDUCATION: CPT

## 2023-08-16 RX ORDER — ALPRAZOLAM 0.25 MG/1
TABLET ORAL
Qty: 90 TABLET | Refills: 0 | Status: CANCELLED | OUTPATIENT
Start: 2023-08-16

## 2023-08-16 NOTE — PROGRESS NOTES
[facility]  Physical Therapy Treatment Note     Name: Oliver Burk Jr.  Clinic Number: [id number]    Therapy Diagnosis:   Encounter Diagnoses   Name Primary?    Decreased activity tolerance Yes    Decreased strength of trunk and back      Physician: Scott Menjivar MD    Visit Date: 2023    Physician Orders: PT Eval and Treat  Medical Diagnosis from Referral:   1. Back pain, unspecified back location, unspecified back pain laterality, unspecified chronicity    2. Lumbar radiculopathy       Evaluation Date: 2023  Authorization Period Expiration: 2024   Plan of Care Expiration: 2023  Reassessment Due: 2023  Visit # / Visits authorized:     PTA Visit #: 0/5     Time In: 10:35 am  Time Out: 11:25 am  Total Billable Time: 45 minutes  INSURANCE and OUTCOMES: Fee for Service with FOTO Outcomes 1/3    Precautions: standard, HTN, and arthritis, A-Fib, Hx CA    Pattern of pain determined: 1 PEP    Subjective     Oliver Burk Jr. reports he has been doing his prone extensions multiple times a day. Still without back pain, has not noticed much change in his walking and mobility.     Patient reports tolerating previous visit well  Patient reports their pain to be 0/10 on a 0-10 scale with 0 being no pain and 10 being the worst pain imaginable.  Pain Location: R knee     Occupation:   Leisure: Read, cards with wife, dog     Pt goals: Be able to walk longer without pain and be able to stand on toes.     Objective      Baseline Isometric Testing on Med X equipment: Testing administered by PT     Date of testin2023  ROM 0-48 deg   Max Peak Torque 180    Min Peak Torque 54    Flex/Ext Ratio 3.33:1   % below normative data 26     Outcomes:  Intake Score: 22%   Visit 5 Score:   Visit 10 Score:   Discharge Score:   Goal Score: 10%    Treatment     Oliver received the treatments listed below:      Oliver received neuromuscular education for 10  minutes via participation on the [employer] Machine. Therapist assisted patient in isolating and engaging spinal stabilization musculature in order to improve functional ability and postural control. Patient performed exercise with therapist guidance in order to accurately use pacer function, avoid valsalva, and optimally exert effort within a safe and effective range via the Nika Exertion Rating Scale. Patient instructed to perform at a midrange of exertion and to complete 15-20 repetitions within appropriate split time, with proper technique, and while maintaining safety.     [facility] - Short 8/16/2023   Visit Number 2   VAS Pain Rating 0   Time 5   Extension in Lying 40   Extension in Standing -   Lumbar Extension - Seat Pad -   Femur Restraint -   Top Dead Center -   Counterweight -   Lumbar Flexion -   Lumbar Extension -   Lumbar Peak Torque -   Lumbar Weight 70   Repetitions 15   Rating of Perceived Exertion 4         Oliver participated in neuromuscular re-education activities to improve balance, coordination, proprioception, motor control and/or posture for - minutes. The following activities were included:       Oliver participated in therapeutic exercises to develop strength, endurance, ROM, flexibility, posture, and core stabilization for 30 minutes including:    Prone press ups 2x10  With OP 2x10    Peripheral muscle strengthening which included one set of 15-20 repetitions at a slow and controlled 10-13 second per rep pace focused on strengthening supporting musculature in order to improve body mechanics and functional mobility. Patient and therapist focused on proper form during treatment to ensure optimal strengthening of each targeted muscle group.  Machines utilized include torso rotation, leg extension, leg curl, chest press, upright row. Tricep extension, bicep curl, leg press, and hip abduction added visit 3    Oliver participated in dynamic functional therapeutic activities to  improve functional performance and simulate household and community activities for   minutes. The following activities were included:    Oliver received manual therapy techniques for 5  minutes. The following activities were included:    Lumbar CPA's grade III/IV x5'    Pt given cold pack for 5 minutes to low back in z-lie.    Patient Education and Home Exercises     Home exercises include:  Prone press ups  Cardio program (V5): -  Lifting education (V11): -  Posture/Lumbar roll: acquired  Fridge Magnet Discharge handout (date given): -  Equipment at home/gym membership: yes    Education provided:   - PT role and POC  - HEP      Written Home Exercises Provided: Patient instructed to cont prior HEP.  Exercises were reviewed and Oliver was able to demonstrate them prior to the end of the session.  Oliver demonstrated good  understanding of the education provided.     See EMR under Patient Instructions for exercises provided prior visit.    Assessment     Pt presents to second healthy back visit reporting unchanged symptoms, was able to demo HEP with Min VC for form. Pt was able to start neuro reeducation training, strengthening, and endurance training on the medx at 70 ft/lbs according to the initial visit isometric test. Pt was able to complete 15 reps, with 4/10 RPE. Pt was also able to complete half of the peripheral strengthening exercises without increased discomfort and will complete the complete circuit next visit as tolerated.    Patient is making good progress towards established goals.  Pt will continue to benefit from skilled outpatient physical therapy to address the deficits stated in the impairment chart, provide pt/family education and to maximize pt's level of independence in the home and community environment.     Anticipated Barriers for therapy: unsteady gait  Pt's spiritual, cultural and educational needs considered and pt agreeable to plan of care and goals as stated below:     GOALS: Pt is in  agreement with the following goals.     Short term goals:  6 weeks or 10 visits   - Pt will demonstrate increased lumbar ROM by at least 3 degrees from the initial ROM value with improvements noted in functional ROM and ability to perform ADLs. Appropriate and Ongoing  - Pt will demonstrate increased MedX average isometric strength value by 15% from initial test resulting in improved ability to perform bending, lifting, and carrying activities safely, confidently. Appropriate and Ongoing  - Pt will report better balance in static standing . Appropriate and Ongoing  - Pt able to perform HEP correctly with minimal cueing or supervision from therapist to encourage independent management of symptoms. Appropriate and Ongoing     Long term goals: 10 weeks or 20 visits   - Pt will demonstrate increased lumbar ROM by at least 6 degrees from initial ROM value, resulting in improved ability to perform functional forward bending while standing and sitting. Appropriate and Ongoing  - Pt will demonstrate increased MedX average isometric strength value by 30% from initial test resulting in improved ability to perform bending, lifting, and carrying activities safely and confidently. Appropriate and Ongoing  - Pt to demonstrate ability to independently control and reduce their pain through posture positioning and mechanical movements throughout a typical day. Appropriate and Ongoing  - Pt will demonstrate reduced pain and improved functional outcomes as reported on the Oswestry Disability Index by reaching a score of 10 or less in order to demonstrate subjective improvement in pt's condition.   Appropriate and Ongoing  - Pt will demonstrate independence with the HEP at discharge. Appropriate and Ongoing  - Pt able to walk up to 30 min without increase in back or leg pain/discomfort (patient goal) Appropriate and Ongoing    Plan     Continue with established Plan of Care towards established PT goals.     Therapist: Petr Simons,  PT  8/16/2023

## 2023-08-17 RX ORDER — OSELTAMIVIR PHOSPHATE 75 MG/1
75 CAPSULE ORAL 2 TIMES DAILY
Qty: 10 CAPSULE | Refills: 0 | Status: SHIPPED | OUTPATIENT
Start: 2023-08-17 | End: 2023-08-22

## 2023-09-05 ENCOUNTER — TELEPHONE (OUTPATIENT)
Dept: NEUROSURGERY | Facility: CLINIC | Age: 83
End: 2023-09-05
Payer: MEDICARE

## 2023-09-05 DIAGNOSIS — M54.9 BACK PAIN, UNSPECIFIED BACK LOCATION, UNSPECIFIED BACK PAIN LATERALITY, UNSPECIFIED CHRONICITY: Primary | ICD-10-CM

## 2023-09-05 DIAGNOSIS — M54.16 LUMBAR RADICULOPATHY: ICD-10-CM

## 2023-09-05 NOTE — TELEPHONE ENCOUNTER
----- Message from Moriah Lewis sent at 9/5/2023 10:51 AM CDT -----  Regarding: Return Call  Contact: Pt @ [phone]  Pt is calling to get nurse to call back to discuss upcoming appt. Asking for a call back

## 2023-09-06 ENCOUNTER — HOSPITAL ENCOUNTER (OUTPATIENT)
Dept: CARDIOLOGY | Facility: OTHER | Age: 83
Discharge: HOME | End: 2023-09-06
Attending: INTERNAL MEDICINE
Payer: MEDICARE

## 2023-09-06 VITALS
HEIGHT: 72 IN | BODY MASS INDEX: 24.92 KG/M2 | WEIGHT: 184 LBS | SYSTOLIC BLOOD PRESSURE: 116 MMHG | DIASTOLIC BLOOD PRESSURE: 56 MMHG

## 2023-09-06 DIAGNOSIS — I48.19 PERSISTENT ATRIAL FIBRILLATION: ICD-10-CM

## 2023-09-06 LAB
AORTIC ROOT ANNULUS: 2.53 CM
ASCENDING AORTA: 2.77 CM
AV INDEX (PROSTH): 1.3
AV MEAN GRADIENT: 2 MMHG
AV PEAK GRADIENT: 3 MMHG
AV VALVE AREA BY VELOCITY RATIO: 3.37 CM²
AV VALVE AREA: 3.49 CM²
AV VELOCITY RATIO: 1.25
BSA FOR ECHO PROCEDURE: 2.06 M2
CV ECHO LV RWT: 0.66 CM
DOP CALC AO PEAK VEL: 0.87 M/S
DOP CALC AO VTI: 15.3 CM
DOP CALC LVOT AREA: 2.7 CM2
DOP CALC LVOT DIAMETER: 1.85 CM
DOP CALC LVOT PEAK VEL: 1.09 M/S
DOP CALC LVOT STROKE VOLUME: 53.46 CM3
DOP CALCLVOT PEAK VEL VTI: 19.9 CM
ECHO LV POSTERIOR WALL: 1.32 CM (ref 0.6–1.1)
EJECTION FRACTION: 35 %
FRACTIONAL SHORTENING: 29 % (ref 28–44)
INTERVENTRICULAR SEPTUM: 1.32 CM (ref 0.6–1.1)
IVC DIAMETER: 2.23 CM
IVRT: 45.67 MSEC
LA MAJOR: 6.5 CM
LA MINOR: 6.23 CM
LA WIDTH: 4.2 CM
LEFT ATRIUM AREA SYSTOLIC (APICAL 2 CHAMBER): 25.9 CM2
LEFT ATRIUM AREA SYSTOLIC (APICAL 4 CHAMBER): 30.5 CM2
LEFT ATRIUM SIZE: 3.58 CM
LEFT ATRIUM VOLUME INDEX MOD: 43.6 ML/M2
LEFT ATRIUM VOLUME INDEX: 39.5 ML/M2
LEFT ATRIUM VOLUME MOD: 89.8 CM3
LEFT ATRIUM VOLUME: 81.31 CM3
LEFT INTERNAL DIMENSION IN SYSTOLE: 2.87 CM (ref 2.1–4)
LEFT VENTRICLE DIASTOLIC VOLUME INDEX: 34.55 ML/M2
LEFT VENTRICLE DIASTOLIC VOLUME: 71.18 ML
LEFT VENTRICLE MASS INDEX: 94 G/M2
LEFT VENTRICLE SYSTOLIC VOLUME INDEX: 15.3 ML/M2
LEFT VENTRICLE SYSTOLIC VOLUME: 31.5 ML
LEFT VENTRICULAR INTERNAL DIMENSION IN DIASTOLE: 4.03 CM (ref 3.5–6)
LEFT VENTRICULAR MASS: 193.03 G
LVOT MG: 2.3 MMHG
LVOT MV: 0.7 CM/S
PISA TR MAX VEL: 2.16 M/S
PV MV: 0.45 M/S
PV PEAK GRADIENT: 2 MMHG
PV PEAK VELOCITY: 0.62 M/S
RA MAJOR: 5.89 CM
RA PRESSURE ESTIMATED: 15 MMHG
RA WIDTH: 4.3 CM
RIGHT VENTRICULAR END-DIASTOLIC DIMENSION: 2.69 CM
RV TB RVSP: 17 MMHG
RV TISSUE DOPPLER FREE WALL SYSTOLIC VELOCITY 1 (APICAL 4 CHAMBER VIEW): 11.6 CM/S
SINUS: 2.3 CM
STJ: 2.47 CM
TR MAX PG: 19 MMHG
TRICUSPID ANNULAR PLANE SYSTOLIC EXCURSION: 1.8 CM
TV REST PULMONARY ARTERY PRESSURE: 34 MMHG
Z-SCORE OF LEFT VENTRICULAR DIMENSION IN END DIASTOLE: -4.34
Z-SCORE OF LEFT VENTRICULAR DIMENSION IN END SYSTOLE: -2.24

## 2023-09-06 PROCEDURE — 93306 ECHO (CUPID ONLY): ICD-10-PCS | Mod: 26,,, | Performed by: INTERNAL MEDICINE

## 2023-09-06 PROCEDURE — 93306 TTE W/DOPPLER COMPLETE: CPT

## 2023-09-06 PROCEDURE — 93306 TTE W/DOPPLER COMPLETE: CPT | Mod: 26,,, | Performed by: INTERNAL MEDICINE

## 2023-09-08 ENCOUNTER — RESEARCH ENCOUNTER (OUTPATIENT)
Dept: RESEARCH | Facility: HOSPITAL | Age: 83
End: 2023-09-08
Payer: MEDICARE

## 2023-09-08 ENCOUNTER — HOSPITAL ENCOUNTER (OUTPATIENT)
Dept: CARDIOLOGY | Facility: CLINIC | Age: 83
Discharge: HOME | End: 2023-09-08
Payer: MEDICARE

## 2023-09-08 ENCOUNTER — CLINICAL SUPPORT (OUTPATIENT)
Dept: INTERNAL MEDICINE | Facility: CLINIC | Age: 83
End: 2023-09-08
Payer: MEDICARE

## 2023-09-08 ENCOUNTER — OFFICE VISIT (OUTPATIENT)
Dept: ELECTROPHYSIOLOGY | Facility: CLINIC | Age: 83
End: 2023-09-08
Payer: MEDICARE

## 2023-09-08 VITALS
HEIGHT: 72 IN | WEIGHT: 183.19 LBS | BODY MASS INDEX: 24.81 KG/M2 | HEART RATE: 108 BPM | DIASTOLIC BLOOD PRESSURE: 78 MMHG | SYSTOLIC BLOOD PRESSURE: 118 MMHG

## 2023-09-08 DIAGNOSIS — I10 HYPERTENSION, UNSPECIFIED TYPE: ICD-10-CM

## 2023-09-08 DIAGNOSIS — I48.19 PERSISTENT ATRIAL FIBRILLATION: ICD-10-CM

## 2023-09-08 DIAGNOSIS — I48.0 PAROXYSMAL ATRIAL FIBRILLATION: Primary | ICD-10-CM

## 2023-09-08 DIAGNOSIS — G47.33 OSA (OBSTRUCTIVE SLEEP APNEA): ICD-10-CM

## 2023-09-08 DIAGNOSIS — Z23 IMMUNIZATION DUE: Primary | ICD-10-CM

## 2023-09-08 DIAGNOSIS — I48.91 ATRIAL FIBRILLATION BY ELECTROCARDIOGRAM: ICD-10-CM

## 2023-09-08 PROCEDURE — 99999 PR PBB SHADOW E&M-EST. PATIENT-LVL III: CPT | Mod: PBBFAC,,, | Performed by: INTERNAL MEDICINE

## 2023-09-08 PROCEDURE — 99999PBSHW FLU VACCINE - QUADRIVALENT - ADJUVANTED: ICD-10-PCS | Mod: PBBFAC,,,

## 2023-09-08 PROCEDURE — 99215 OFFICE O/P EST HI 40 MIN: CPT | Mod: S$PBB,,, | Performed by: INTERNAL MEDICINE

## 2023-09-08 PROCEDURE — 93010 RHYTHM STRIP: ICD-10-PCS | Mod: S$PBB,,, | Performed by: INTERNAL MEDICINE

## 2023-09-08 PROCEDURE — 93010 ELECTROCARDIOGRAM REPORT: CPT | Mod: S$PBB,,, | Performed by: INTERNAL MEDICINE

## 2023-09-08 PROCEDURE — 99213 OFFICE O/P EST LOW 20 MIN: CPT | Mod: PBBFAC,25 | Performed by: INTERNAL MEDICINE

## 2023-09-08 PROCEDURE — 99999 PR PBB SHADOW E&M-EST. PATIENT-LVL III: ICD-10-PCS | Mod: PBBFAC,,, | Performed by: INTERNAL MEDICINE

## 2023-09-08 PROCEDURE — 99215 PR OFFICE/OUTPT VISIT, EST, LEVL V, 40-54 MIN: ICD-10-PCS | Mod: S$PBB,,, | Performed by: INTERNAL MEDICINE

## 2023-09-08 PROCEDURE — 99999PBSHW FLU VACCINE - QUADRIVALENT - ADJUVANTED: Mod: PBBFAC,,,

## 2023-09-08 PROCEDURE — 93005 ELECTROCARDIOGRAM TRACING: CPT | Mod: PBBFAC | Performed by: INTERNAL MEDICINE

## 2023-09-08 PROCEDURE — G0008 ADMIN INFLUENZA VIRUS VAC: HCPCS | Mod: PBBFAC

## 2023-09-08 RX ORDER — VERAPAMIL HYDROCHLORIDE 120 MG/1
120 CAPSULE, EXTENDED RELEASE ORAL DAILY
Qty: 90 CAPSULE | Refills: 3 | Status: SHIPPED | OUTPATIENT
Start: 2023-09-08 | End: 2023-12-06 | Stop reason: SDUPTHER

## 2023-09-08 NOTE — PROGRESS NOTES
Subjective:    Patient ID:  Oliver Burk Jr. is a 83 y.o. male who presents for evaluation of AF    HPI  83 y.o. M  referred by his friend Dr QUINTIN [place]  HTN on meds  HL on meds  BPH  spinal stenosis c/b bilat foot drop    Newly discovered asymptomatic AF with RVR.  Works out without a problem.    7/2020 ECG: SR  TSH wnl    We did RAINER/DCCV 10/7/22. He noticed no difference between AF and NSR. Per [employer] monitor, NSR lasted about 2 days.   He's still working out 5-6x/week without issues.     Update 9/2023:  Feeling well. Here for f/u.  Echo 9/23 read as LVEF 35%. Because this was a significant decrease from prior, I reviewed it with Elliot Stallworth and Ricky. LVEF is actually 55%. There's moderate MR.  Holter: AF averaging 103 bpm.    My interpretation of today's ECG is AF with     Review of Systems   Constitutional: Negative. Negative for malaise/fatigue.   HENT: Negative.  Negative for ear pain and tinnitus.    Eyes:  Negative for blurred vision.   Cardiovascular: Negative.  Negative for chest pain, dyspnea on exertion, near-syncope, palpitations and syncope.   Respiratory: Negative.  Negative for shortness of breath.    Endocrine: Negative.  Negative for polyuria.   Hematologic/Lymphatic: Does not bruise/bleed easily.   Skin: Negative.  Negative for rash.   Musculoskeletal: Negative.  Negative for joint pain and muscle weakness.   Gastrointestinal: Negative.  Negative for abdominal pain and change in bowel habit.   Genitourinary:  Negative for frequency.   Neurological: Negative.  Negative for dizziness and weakness. Focal weakness: bilateral foot drop.  Psychiatric/Behavioral: Negative.  Negative for depression. The patient is not nervous/anxious.    Allergic/Immunologic: Negative for environmental allergies.        Objective:    Physical Exam  Vitals and nursing note reviewed.   Constitutional:       Appearance: He is well-developed.   HENT:      Head: Normocephalic and atraumatic.   Eyes:       General: Lids are normal. No scleral icterus.     Conjunctiva/sclera: Conjunctivae normal.   Neck:      Thyroid: No thyromegaly.      Vascular: No JVD.      Trachea: No tracheal deviation.   Cardiovascular:      Rate and Rhythm: Tachycardia present. Rhythm irregular.      Pulses:           Radial pulses are 2+ on the right side and 2+ on the left side.   Pulmonary:      Effort: Pulmonary effort is normal. No tachypnea, accessory muscle usage or respiratory distress.      Breath sounds: No wheezing.   Abdominal:      General: There is no distension.   Musculoskeletal:         General: Normal range of motion.      Cervical back: Normal range of motion.   Skin:     General: Skin is warm and dry.   Neurological:      Mental Status: He is alert and oriented to person, place, and time.      Motor: No abnormal muscle tone.      Deep Tendon Reflexes: Reflexes are normal and symmetric.   Psychiatric:         Behavior: Behavior normal.           Assessment:       1. Paroxysmal atrial fibrillation    2. Persistent atrial fibrillation    3. Hypertension, unspecified type    4. ROMEO (obstructive sleep apnea)         Plan:       No change in sx between NSR and AF.   Will continue rate-control strategy.    Increased BB for rate control in past; didn't tolerate. Instead, add verapamil today.    Discussed OCEANIC trial (randomizing eliquis vs. asundexian). He'd like to participate. Will enroll today.    Return in 1 year with echo, or earlier prn.

## 2023-09-08 NOTE — PROGRESS NOTES
Date: September 8th, 2023  Sponsor: [employer]  Study Title/IRB Number: Oceanic-AF ([id number])  : Mikel Barriga MD  Present for Discussion: Patient, Spouse, Maria Del Carmen [facility], Petrona Nunez [facility]    Mr. Burk was seen in Dr. Barriga's clinic today and pre-screened for the Oceanic-AF clinical trial by Petrona Nunez and Myself. The study outline and goal was explained to Mr. Burk and his spouse, Brenda, in layman's terms and all questions were answered. Mr. Burk expressed he is interested and wants to enroll in the trial. He was provided with my phone number and a read only copy of the Informed Consent to take home and read over the weekend. We will contact Mr. Burk on Monday to schedule the screening/baseline visit.

## 2023-09-11 ENCOUNTER — TELEPHONE (OUTPATIENT)
Dept: RESEARCH | Facility: HOSPITAL | Age: 83
End: 2023-09-11
Payer: MEDICARE

## 2023-09-11 NOTE — TELEPHONE ENCOUNTER
OCEANIC-AF ([id number])    I called Mr. Burk today to follow up regarding the [facility] Clinical Trial. Mr. Burk was prescribed Verapamil last week at his clinic visit. So, he would like to wait to enroll in the clinical trial until he sees how this new medication works for him. He would still be interested in the future. I will follow up with Mr. Burk in December 2023, as well Mr. Burk has my number.

## 2023-09-13 RX ORDER — ESZOPICLONE 1 MG/1
1 TABLET, FILM COATED ORAL NIGHTLY PRN
Qty: 30 TABLET | Refills: 2 | Status: SHIPPED | OUTPATIENT
Start: 2023-09-13

## 2023-09-15 ENCOUNTER — CLINICAL SUPPORT (OUTPATIENT)
Dept: REHABILITATION | Facility: OTHER | Age: 83
End: 2023-09-15
Payer: MEDICARE

## 2023-09-15 DIAGNOSIS — R68.89 DECREASED ACTIVITY TOLERANCE: Primary | ICD-10-CM

## 2023-09-15 DIAGNOSIS — R29.898 DECREASED STRENGTH OF TRUNK AND BACK: ICD-10-CM

## 2023-09-15 PROCEDURE — 97110 THERAPEUTIC EXERCISES: CPT

## 2023-09-15 PROCEDURE — 97112 NEUROMUSCULAR REEDUCATION: CPT

## 2023-09-15 NOTE — PROGRESS NOTES
[facility]  Physical Therapy Treatment Note     Name: Oliver Burk Jr.  Clinic Number: [id number]    Therapy Diagnosis:   Encounter Diagnoses   Name Primary?    Decreased activity tolerance Yes    Decreased strength of trunk and back      Physician: Scott Menjivar MD    Visit Date: 9/15/2023    Physician Orders: PT Eval and Treat  Medical Diagnosis from Referral:   1. Back pain, unspecified back location, unspecified back pain laterality, unspecified chronicity    2. Lumbar radiculopathy       Evaluation Date: 2023  Authorization Period Expiration: 2024   Plan of Care Expiration: 2023  Reassessment Due: 2023  Visit # / Visits authorized:     PTA Visit #: 0/5     Time In: 6:58 am  Time Out: 8:00 am  Total Billable Time: 40 minutes  Total time: 52  INSURANCE and OUTCOMES: Fee for Service with FOTO Outcomes 1/3    Precautions: standard, HTN, and arthritis, A-Fib, Hx CA    Pattern of pain determined: 1 PEP    Subjective     Oliver Burk Jr. reports he is doing the exercises at home still and they were ghazal to fix his hair at the office so the lumbar roll is in the right place. He does cardio, strength and stretching for about 45 min at home.    Patient reports tolerating previous visit well  Patient reports their pain to be 0/10 on a 0-10 scale with 0 being no pain and 10 being the worst pain imaginable.  Pain Location: R knee     Occupation:   Leisure: Read, cards with wife, dog     Pt goals: Be able to walk longer without pain and be able to stand on toes.     Objective      Baseline Isometric Testing on Med X equipment: Testing administered by PT  Date of testin2023  ROM 0-48 deg   Max Peak Torque 180    Min Peak Torque 54    Flex/Ext Ratio 3.33:1   % below normative data 26     Outcomes:  Intake Score: 22%   Visit 5 Score:   Visit 10 Score:   Discharge Score:   Goal Score: 10%    Treatment     Oliver received the treatments  Report to night RN   "listed below:      Oliver received neuromuscular education for 10 minutes via participation on the [employer] Machine. Therapist assisted patient in isolating and engaging spinal stabilization musculature in order to improve functional ability and postural control. Patient performed exercise with therapist guidance in order to accurately use pacer function, avoid valsalva, and optimally exert effort within a safe and effective range via the Nika Exertion Rating Scale. Patient instructed to perform at a midrange of exertion and to complete 15-20 repetitions within appropriate split time, with proper technique, and while maintaining safety.         9/15/2023     7:34 AM   [facility]   Visit Number 3   VAS Pain Rating 0   Time 5   Extension in Lying 20   Lumbar Weight 70 lbs   Repetitions 18   Rating of Perceived Exertion 2.5   Ice - Z Lie (in min.) 5           Oliver participated in neuromuscular re-education activities to improve balance, coordination, proprioception, motor control and/or posture for 15 minutes. The following activities were included:     Standing on foam with ball toss  Sit to stand to airx foam (foam in chair)  Sit to stand without foam (foam in chair)  Side stepping 2 hand assist - 1 hand assist    Oliver participated in therapeutic exercises to develop strength, endurance, ROM, flexibility, posture, and core stabilization for 30 minutes including:    Prone press ups 2x10  Bird dog 5"x10  TrA brace with SLR  Bridge   Bridge with kick    Peripheral muscle strengthening which included one set of 15-20 repetitions at a slow and controlled 10-13 second per rep pace focused on strengthening supporting musculature in order to improve body mechanics and functional mobility. Patient and therapist focused on proper form during treatment to ensure optimal strengthening of each targeted muscle group.  Machines utilized include torso rotation, leg extension, leg curl, chest press, upright row. " Tricep extension, bicep curl, leg press, and hip abduction added visit 3    Oliver participated in dynamic functional therapeutic activities to improve functional performance and simulate household and community activities for   minutes. The following activities were included:    Oliver received manual therapy techniques for 5  minutes. The following activities were included:    Lumbar CPA's grade III/IV x5'    Pt given cold pack for 5 minutes to low back in z-lie.    Patient Education and Home Exercises     Home exercises include:  Prone press ups  Cardio program (V5): -  Lifting education (V11): -  Posture/Lumbar roll: acquired  Fridge Magnet Discharge handout (date given): -  Equipment at home/gym membership: yes    Education provided:   - PT role and POC  - HEP      Written Home Exercises Provided: Patient instructed to cont prior HEP.  Exercises were reviewed and Oliver was able to demonstrate them prior to the end of the session.  Oliver demonstrated good  understanding of the education provided.     See EMR under Patient Instructions for exercises provided prior visit.    Assessment     Oliver presents to therapy with no complaints of pain and continued complaints of weakness in the legs with instability and difficulty standing flat on the feet. Resumed extension exercises with pt education on benefits of getting into lumbar extension for joint health. Progressed to trunk stability exercises, he has good awareness to trunk stability, continued to progress to unilateral strengthening in supine with bridges and 90/90 heel tap. He had some fatigue with these and encouraged to use the breath. Per pt report of instability and imbalance in standing began some sit to stand exercises and the difficulty to get into upright standing with knee flexion compensation. When given manual stabilization at the knee he was able to complete sit to stand without knee flexion compensation and reported feeling more stability.  Attempted unilateral strength in standing however he was not able to tolerate. Modified by completing side stepping supported and with partial support. Continued with strengthening on the lumbar medx and he was able to complete 18 repetitions at 2.5/10 RPE. Plan to continue with functional stability exercises.    Patient is making good progress towards established goals.  Pt will continue to benefit from skilled outpatient physical therapy to address the deficits stated in the impairment chart, provide pt/family education and to maximize pt's level of independence in the home and community environment.     Anticipated Barriers for therapy: unsteady gait  Pt's spiritual, cultural and educational needs considered and pt agreeable to plan of care and goals as stated below:     GOALS: Pt is in agreement with the following goals.     Short term goals:  6 weeks or 10 visits   - Pt will demonstrate increased lumbar ROM by at least 3 degrees from the initial ROM value with improvements noted in functional ROM and ability to perform ADLs. Appropriate and Ongoing  - Pt will demonstrate increased MedX average isometric strength value by 15% from initial test resulting in improved ability to perform bending, lifting, and carrying activities safely, confidently. Appropriate and Ongoing  - Pt will report better balance in static standing . Appropriate and Ongoing  - Pt able to perform HEP correctly with minimal cueing or supervision from therapist to encourage independent management of symptoms. Appropriate and Ongoing     Long term goals: 10 weeks or 20 visits   - Pt will demonstrate increased lumbar ROM by at least 6 degrees from initial ROM value, resulting in improved ability to perform functional forward bending while standing and sitting. Appropriate and Ongoing  - Pt will demonstrate increased MedX average isometric strength value by 30% from initial test resulting in improved ability to perform bending, lifting, and  carrying activities safely and confidently. Appropriate and Ongoing  - Pt to demonstrate ability to independently control and reduce their pain through posture positioning and mechanical movements throughout a typical day. Appropriate and Ongoing  - Pt will demonstrate reduced pain and improved functional outcomes as reported on the Oswestry Disability Index by reaching a score of 10 or less in order to demonstrate subjective improvement in pt's condition.   Appropriate and Ongoing  - Pt will demonstrate independence with the HEP at discharge. Appropriate and Ongoing  - Pt able to walk up to 30 min without increase in back or leg pain/discomfort (patient goal) Appropriate and Ongoing    Plan     Continue with established Plan of Care towards established PT goals.     Therapist: Evin Mario, PT  9/15/2023

## 2023-09-18 RX ORDER — ZALEPLON 10 MG/1
10 CAPSULE ORAL NIGHTLY
Qty: 30 CAPSULE | Refills: 0 | Status: SHIPPED | OUTPATIENT
Start: 2023-09-18 | End: 2023-10-18

## 2023-09-19 ENCOUNTER — CLINICAL SUPPORT (OUTPATIENT)
Dept: REHABILITATION | Facility: OTHER | Age: 83
End: 2023-09-19
Payer: MEDICARE

## 2023-09-19 DIAGNOSIS — N40.0 BENIGN PROSTATIC HYPERPLASIA, UNSPECIFIED WHETHER LOWER URINARY TRACT SYMPTOMS PRESENT: ICD-10-CM

## 2023-09-19 DIAGNOSIS — R68.89 DECREASED ACTIVITY TOLERANCE: Primary | ICD-10-CM

## 2023-09-19 DIAGNOSIS — R29.898 DECREASED STRENGTH OF TRUNK AND BACK: ICD-10-CM

## 2023-09-19 PROCEDURE — 97112 NEUROMUSCULAR REEDUCATION: CPT

## 2023-09-19 PROCEDURE — 97110 THERAPEUTIC EXERCISES: CPT

## 2023-09-19 RX ORDER — TAMSULOSIN HYDROCHLORIDE 0.4 MG/1
1 CAPSULE ORAL NIGHTLY
Qty: 90 CAPSULE | Refills: 3 | Status: SHIPPED | OUTPATIENT
Start: 2023-09-19 | End: 2023-12-06 | Stop reason: SDUPTHER

## 2023-09-19 NOTE — PROGRESS NOTES
[facility]  Physical Therapy Treatment Note     Name: Oliver Burk Jr.  Clinic Number: [id number]    Therapy Diagnosis:   Encounter Diagnoses   Name Primary?    Decreased activity tolerance Yes    Decreased strength of trunk and back      Physician: Scott Menjivar MD    Visit Date: 2023    Physician Orders: PT Eval and Treat  Medical Diagnosis from Referral:   1. Back pain, unspecified back location, unspecified back pain laterality, unspecified chronicity    2. Lumbar radiculopathy       Evaluation Date: 2023  Authorization Period Expiration: 2024   Plan of Care Expiration: 2023  Reassessment Due: 10/19/2023  Visit # / Visits authorized:     PTA Visit #: 0/5     Time In: 6:59 am  Time Out: 8:00 am  Total Billable Time: 61 minutes  Total time: 61  INSURANCE and OUTCOMES: Fee for Service with FOTO Outcomes 1/3    Precautions: standard, HTN, and arthritis, A-Fib, Hx CA    Pattern of pain determined: 1 PEP    Subjective     Oliver Burk Jr. reports he has been doing the balance exercises at home he noticed standing up with the stagger stance felt more stable.    Patient reports tolerating previous visit well  Patient reports their pain to be 0/10 on a 0-10 scale with 0 being no pain and 10 being the worst pain imaginable.  Pain Location: R knee     Occupation:   Leisure: Read, cards with wife, dog     Pt goals: Be able to walk longer without pain and be able to stand on toes.     Objective      Baseline Isometric Testing on Med X equipment: Testing administered by PT  Date of testin2023  ROM 0-48 deg   Max Peak Torque 180    Min Peak Torque 54    Flex/Ext Ratio 3.33:1   % below normative data 26     Outcomes:  Intake Score: 22%   Visit 5 Score:   Visit 10 Score:   Discharge Score:   Goal Score: 10%    Treatment     Oliver received the treatments listed below:      Oliver received neuromuscular education for 10 minutes via  participation on the [employer] Machine. Therapist assisted patient in isolating and engaging spinal stabilization musculature in order to improve functional ability and postural control. Patient performed exercise with therapist guidance in order to accurately use pacer function, avoid valsalva, and optimally exert effort within a safe and effective range via the Nika Exertion Rating Scale. Patient instructed to perform at a midrange of exertion and to complete 15-20 repetitions within appropriate split time, with proper technique, and while maintaining safety.         9/19/2023     7:35 AM   [facility]   Visit Number 4   VAS Pain Rating 0   Time 5   Extension in Lying 25   Lumbar Weight 70 lbs   Repetitions 20   Rating of Perceived Exertion 5   Ice - Z Lie (in min.) 5         Oliver participated in neuromuscular re-education activities to improve balance, coordination, proprioception, motor control and/or posture for 20 minutes. The following activities were included:     Side stepping 12x3 bilat  Stagger stance balance 10 sec x3  Kicking ball with balance/hand assist x10  Kicking ball without balance/hand assist x10  Sit to stand with one foot on yoga block (seated on 2 airx) x10  Sit to stand wih one foot on yoga block (seated on 1 airx) x10      Not performed 9/19/2023 :    Standing on foam with ball toss  Sit to stand to airx foam (foam in chair)  Sit to stand without foam (foam in chair)      Oliver participated in therapeutic exercises to develop strength, endurance, ROM, flexibility, posture, and core stabilization for 25 minutes including:      Cat camel x10  Prone press ups 15+10      Completed Peripheral muscle strengthening which included one set of 15-20 repetitions at a slow and controlled 10-13 second per rep pace focused on strengthening supporting musculature in order to improve body mechanics and functional mobility. Patient and therapist focused on proper form during treatment to  ensure optimal strengthening of each targeted muscle group.  Machines utilized include torso rotation, leg extension, leg curl, chest press, upright row. Tricep extension, bicep curl, leg press, and hip abduction added visit 3    Oliver participated in dynamic functional therapeutic activities to improve functional performance and simulate household and community activities for   minutes. The following activities were included:    Oliver received manual therapy techniques for 5  minutes. The following activities were included:    Lumbar CPA's grade III/IV x5'    Pt given cold pack for 5 minutes to low back in z-lie.    Patient Education and Home Exercises     Home exercises include:  Prone press ups  Cardio program (V5): -  Lifting education (V11): -  Posture/Lumbar roll: acquired  Fridge [employer] Discharge handout (date given): -  Equipment at home/gym membership: yes    Education provided:   - PT role and POC  - HEP      Written Home Exercises Provided: Patient instructed to cont prior HEP.  Exercises were reviewed and Oliver was able to demonstrate them prior to the end of the session.  Oliver demonstrated good  understanding of the education provided.     See EMR under Patient Instructions for exercises provided prior visit.    Assessment     Oliver presents to therapy with no complaints of pain and continued complaints of weakness in the legs with instability and difficulty standing flat on the feet. Continued with general spine mobilier and lumbar extension with PA glide to which pt expresses the back feels more loose after therapist mobilization. Continued with functional balance and unilateral balance. Pt demonstrates the most instability when balancing on the LLE including side stepping to the R, sit to stand with R leg propped up on yoga block. Pt requires max tactile and verbal cues to extend the L knee in standing, pt reports he tends to flex the knee to feel like the whole foot is on the ground. Pt  appears to benefit from more physical therapy for balance and not the healthy back program circuit. He is not having discomfort for pain in the back or legs. He was able to complete exercises this visit with some difficulty with balance exercises. Maintained weight on the lumbar medx and he was able to complete 20 repetitions at 3/10 RPE. On the peripheral exercises he had some discomfort in the R shoulder with the chest press, plan to decrease resistance next visit.    Resumed extension exercises with pt education on benefits of getting into lumbar extension for joint health. Progressed to trunk stability exercises, he has good awareness to trunk stability, continued to progress to unilateral strengthening in supine with bridges and 90/90 heel tap. He had some fatigue with these and encouraged to use the breath. Per pt report of instability and imbalance in standing began some sit to stand exercises and the difficulty to get into upright standing with knee flexion compensation. When given manual stabilization at the knee he was able to complete sit to stand without knee flexion compensation and reported feeling more stability. Attempted unilateral strength in standing however he was not able to tolerate. Modified by completing side stepping supported and with partial support. Continued with strengthening on the lumbar medx Plan to continue with functional stability exercises.    Patient is making good progress towards established goals.  Pt will continue to benefit from skilled outpatient physical therapy to address the deficits stated in the impairment chart, provide pt/family education and to maximize pt's level of independence in the home and community environment.     Anticipated Barriers for therapy: unsteady gait  Pt's spiritual, cultural and educational needs considered and pt agreeable to plan of care and goals as stated below:     GOALS: Pt is in agreement with the following goals.     Short term goals:  6  weeks or 10 visits   - Pt will demonstrate increased lumbar ROM by at least 3 degrees from the initial ROM value with improvements noted in functional ROM and ability to perform ADLs. Appropriate and Ongoing  - Pt will demonstrate increased MedX average isometric strength value by 15% from initial test resulting in improved ability to perform bending, lifting, and carrying activities safely, confidently. Appropriate and Ongoing  - Pt will report better balance in static standing . Appropriate and Ongoing  - Pt able to perform HEP correctly with minimal cueing or supervision from therapist to encourage independent management of symptoms. Appropriate and Ongoing     Long term goals: 10 weeks or 20 visits   - Pt will demonstrate increased lumbar ROM by at least 6 degrees from initial ROM value, resulting in improved ability to perform functional forward bending while standing and sitting. Appropriate and Ongoing  - Pt will demonstrate increased MedX average isometric strength value by 30% from initial test resulting in improved ability to perform bending, lifting, and carrying activities safely and confidently. Appropriate and Ongoing  - Pt to demonstrate ability to independently control and reduce their pain through posture positioning and mechanical movements throughout a typical day. Appropriate and Ongoing  - Pt will demonstrate reduced pain and improved functional outcomes as reported on the Oswestry Disability Index by reaching a score of 10 or less in order to demonstrate subjective improvement in pt's condition.   Appropriate and Ongoing  - Pt will demonstrate independence with the HEP at discharge. Appropriate and Ongoing  - Pt able to walk up to 30 min without increase in back or leg pain/discomfort (patient goal) Appropriate and Ongoing    Plan     Continue with established Plan of Care towards established PT goals.     Therapist: Evin Mario, PT  9/19/2023

## 2023-09-22 ENCOUNTER — CLINICAL SUPPORT (OUTPATIENT)
Dept: REHABILITATION | Facility: OTHER | Age: 83
End: 2023-09-22
Payer: MEDICARE

## 2023-09-22 DIAGNOSIS — R68.89 DECREASED ACTIVITY TOLERANCE: Primary | ICD-10-CM

## 2023-09-22 DIAGNOSIS — R29.898 DECREASED STRENGTH OF TRUNK AND BACK: ICD-10-CM

## 2023-09-22 PROCEDURE — 97110 THERAPEUTIC EXERCISES: CPT

## 2023-09-22 PROCEDURE — 97112 NEUROMUSCULAR REEDUCATION: CPT

## 2023-09-22 NOTE — PLAN OF CARE
[facility]  Physical Therapy Treatment Note     Name: Oliver Burk Jr.  Clinic Number: [id number]    Therapy Diagnosis:   Encounter Diagnoses   Name Primary?    Decreased activity tolerance Yes    Decreased strength of trunk and back      Physician: Scott Menjivar MD    Visit Date: 2023    Physician Orders: PT Eval and Treat  Medical Diagnosis from Referral:   1. Back pain, unspecified back location, unspecified back pain laterality, unspecified chronicity    2. Lumbar radiculopathy       Evaluation Date: 2023  Authorization Period Expiration: 2024   Plan of Care Expiration: 2023  Reassessment Due: 10/19/2023  Visit # / Visits authorized:     PTA Visit #: 0/5     Time In: 7:00 am  Time Out: 8:00 am  Total Billable Time: 35 minutes  Total time: 60  INSURANCE and OUTCOMES: Fee for Service with FOTO Outcomes 1/3    Precautions: standard, HTN, and arthritis, A-Fib, Hx CA    Pattern of pain determined: 1 PEP    Subjective     Oliver Burk Jr. reports after last visit he had some shaking in the L leg while taking a shower like the leg was feeling weak. He was able to do that side stepping at the office along the countertop and did better than he did in the clinic    Patient reports tolerating previous visit well  Patient reports their pain to be 0/10 on a 0-10 scale with 0 being no pain and 10 being the worst pain imaginable.  Pain Location: R knee     Occupation:   Leisure: Read, cards with wife, dog     Pt goals: Be able to walk longer without pain and be able to stand on toes.     Objective      Baseline Isometric Testing on Med X equipment: Testing administered by PT  Date of testin2023  ROM 0-48 deg   Max Peak Torque 180    Min Peak Torque 54    Flex/Ext Ratio 3.33:1   % below normative data 26     Outcomes:  Intake Score: 22%   Visit 5 Score:   Visit 10 Score:   Discharge Score:   Goal Score: 10%    Tandem stance: 10 sec  bilat  TUG: TBD    Assessment     Oliver presents to therapy without complaint of low back pain and reports after last visit he has some fatigue in the LLE with static standing in the shower. Completed functional reassessment with pt demonstrating difficulty with weight shifting on toes and on heels with poor to no ankle DF in standing. Continued with general spine mobility and lumbar extension with PA glide to which pt expresses the back feels more loose after therapist mobilization. Continued with functional balance, tandem and bilateral balance. Continued with sit to stand with one foot on yoga block without as much difficulty with weight bearing on the LLE. Increased resistance on the lumbar medx to 75 ft lbs completing 15 repetitions at 4/10 RPE. Plan to transfer to more traditional PT to focus more on balance and stability and not healthy back where he would have to complete peripheral strength exercises, he does these type of exercises at his home gym. Pt verbalizes understanding in transitioning out of healthy back and feels like he will benefit form additional balance exercises.      Patient is making good progress towards established goals.  Pt will continue to benefit from skilled outpatient physical therapy to address the deficits stated in the impairment chart, provide pt/family education and to maximize pt's level of independence in the home and community environment.     Anticipated Barriers for therapy: unsteady gait  Pt's spiritual, cultural and educational needs considered and pt agreeable to plan of care and goals as stated below:     GOALS: Pt is in agreement with the following goals.     Short term goals:  6 weeks or 10 visits - Updated  - Pt will demonstrate </= 15 seconds in TUG test Appropriate and Ongoing  - Pt able to demonstrate weight shift from toes to heels in standing without knee or hip strategy Appropriate and Ongoing  - Pt will demonstrate increased MedX average isometric strength  value by 15% from initial test resulting in improved ability to perform bending, lifting, and carrying activities safely, confidently. Appropriate and Ongoing  - Pt will be able to transfer from sit to stand without initial loss of balance of using knee strategy. Appropriate and Ongoing  - Pt able to perform HEP correctly with minimal cueing or supervision from therapist to encourage independent management of symptoms. Appropriate and Ongoing     Long term goals: 10 weeks or 20 visits   - Pt will demonstrate </= 10 seconds in TUG test Appropriate and Ongoing  - Pt able to demonstrate lateral weight shift with lifting toes into dorsiflexion without loss of balance  Appropriate and Ongoing  - Pt to demonstrate ability to independently balance in tandem stance bilaterally 30 seconds without use of hands  Appropriate and Ongoing  - Pt will demonstrate reduced pain and improved functional outcomes as reported on the Oswestry Disability Index by reaching a score of 10 or less in order to demonstrate subjective improvement in pt's condition.   Appropriate and Ongoing  - Pt will demonstrate independence with the HEP at discharge. Appropriate and Ongoing  - Pt able to walk up to 30 min without increase in back or leg pain/discomfort (patient goal) Appropriate and Ongoing    Plan     Continue with established Plan of Care towards established PT goals.     Therapist: Evin Mario, PT  9/22/2023

## 2023-09-22 NOTE — PROGRESS NOTES
[facility]  Physical Therapy Treatment Note     Name: Oliver Burk Jr.  Clinic Number: [id number]    Therapy Diagnosis:   Encounter Diagnoses   Name Primary?    Decreased activity tolerance Yes    Decreased strength of trunk and back      Physician: Scott Menjivar MD    Visit Date: 2023    Physician Orders: PT Eval and Treat  Medical Diagnosis from Referral:   1. Back pain, unspecified back location, unspecified back pain laterality, unspecified chronicity    2. Lumbar radiculopathy       Evaluation Date: 2023  Authorization Period Expiration: 2024   Plan of Care Expiration: 2023  Reassessment Due: 10/19/2023  Visit # / Visits authorized:     PTA Visit #: 0/5     Time In: 7:00 am  Time Out: 8:00 am  Total Billable Time: 35 minutes  Total time: 60  INSURANCE and OUTCOMES: Fee for Service with FOTO Outcomes 1/3    Precautions: standard, HTN, and arthritis, A-Fib, Hx CA    Pattern of pain determined: 1 PEP    Subjective     Oliver Burk Jr. reports after last visit he had some shaking in the L leg while taking a shower like the leg was feeling weak. He was able to do that side stepping at the office along the countertop and did better than he did in the clinic    Patient reports tolerating previous visit well  Patient reports their pain to be 0/10 on a 0-10 scale with 0 being no pain and 10 being the worst pain imaginable.  Pain Location: R knee     Occupation:   Leisure: Read, cards with wife, dog     Pt goals: Be able to walk longer without pain and be able to stand on toes.     Objective      Baseline Isometric Testing on Med X equipment: Testing administered by PT  Date of testin2023  ROM 0-48 deg   Max Peak Torque 180    Min Peak Torque 54    Flex/Ext Ratio 3.33:1   % below normative data 26     Outcomes:  Intake Score: 22%   Visit 5 Score:   Visit 10 Score:   Discharge Score:   Goal Score: 10%    Tandem stance: 10 sec  bilat  TUG: TBD    Treatment     Oliver received the treatments listed below:      Oliver received neuromuscular education for 10 minutes via participation on the [employer] Machine. Therapist assisted patient in isolating and engaging spinal stabilization musculature in order to improve functional ability and postural control. Patient performed exercise with therapist guidance in order to accurately use pacer function, avoid valsalva, and optimally exert effort within a safe and effective range via the Nika Exertion Rating Scale. Patient instructed to perform at a midrange of exertion and to complete 15-20 repetitions within appropriate split time, with proper technique, and while maintaining safety.         9/22/2023     7:30 AM   [facility]   Visit Number 5   VAS Pain Rating 0   Time 5   Extension in Lying 25   Lumbar Weight 73 lbs   Repetitions 15   Rating of Perceived Exertion 4   Ice - Z Lie (in min.) 5           Oliver participated in neuromuscular re-education activities to improve balance, coordination, proprioception, motor control and/or posture for 20 minutes. The following activities were included:     Standing on Foam weight shift side/side  Standing on Foam weight shift front back  Standing on floor weight shift side/side  Standing on floor weight shift front/back    Side stepping over [employer] 12x2 bilat (max assist)  Tandem stance balance 15 sec x3  Sit to stand with one foot on yoga block (seated on 1 airx) x10        Not performed 9/22/2023 :    Standing on foam with ball toss  Sit to stand to airx foam (foam in chair)  Sit to stand without foam (foam in chair)      Oliver participated in therapeutic exercises to develop strength, endurance, ROM, flexibility, posture, and core stabilization for 25 minutes including:      Cat camel x10  Prone press ups 15  Prone press ups 10 with manual overpressure      Completed Peripheral muscle strengthening which included one set of 15-20 repetitions  at a slow and controlled 10-13 second per rep pace focused on strengthening supporting musculature in order to improve body mechanics and functional mobility. Patient and therapist focused on proper form during treatment to ensure optimal strengthening of each targeted muscle group.  Machines utilized include torso rotation, leg extension, leg curl, chest press, upright row. Tricep extension, bicep curl, leg press, and hip abduction added visit 3    Oliver participated in dynamic functional therapeutic activities to improve functional performance and simulate household and community activities for   minutes. The following activities were included:    Oliver received manual therapy techniques for 5  minutes. The following activities were included:    Lumbar CPA's grade III/IV x5'    Pt given cold pack for 5 minutes to low back in z-lie.    Patient Education and Home Exercises     Home exercises include:  Prone press ups  Side stepping  Sit to stand  Cardio program (V5): -  Lifting education (V11): -  Posture/Lumbar roll: acquired  [employer] Discharge handout (date given): -  Equipment at home/gym membership: yes    Education provided:   - PT role and POC  - HEP      Written Home Exercises Provided: Patient instructed to cont prior HEP.  Exercises were reviewed and Oliver was able to demonstrate them prior to the end of the session.  Oliver demonstrated good  understanding of the education provided.     See EMR under Patient Instructions for exercises provided prior visit.    Assessment     Oliver presents to therapy without complaint of low back pain and reports after last visit he has some fatigue in the LLE with static standing in the shower. Completed functional reassessment with pt demonstrating difficulty with weight shifting on toes and on heels with poor to no ankle DF in standing. Continued with general spine mobility and lumbar extension with PA glide to which pt expresses the back feels more loose after  therapist mobilization. Continued with functional balance, tandem and bilateral balance. Continued with sit to stand with one foot on yoga block without as much difficulty with weight bearing on the LLE. Increased resistance on the lumbar medx to 75 ft lbs completing 15 repetitions at 4/10 RPE. Plan to transfer to more traditional PT to focus more on balance and stability and not healthy back where he would have to complete peripheral strength exercises, he does these type of exercises at his home gym. Pt verbalizes understanding in transitioning out of healthy back and feels like he will benefit form additional balance exercises.      Patient is making good progress towards established goals.  Pt will continue to benefit from skilled outpatient physical therapy to address the deficits stated in the impairment chart, provide pt/family education and to maximize pt's level of independence in the home and community environment.     Anticipated Barriers for therapy: unsteady gait  Pt's spiritual, cultural and educational needs considered and pt agreeable to plan of care and goals as stated below:     GOALS: Pt is in agreement with the following goals.     Short term goals:  6 weeks or 10 visits - Updated  - Pt will demonstrate </= 15 seconds in TUG test Appropriate and Ongoing  - Pt able to demonstrate weight shift from toes to heels in standing without knee or hip strategy Appropriate and Ongoing  - Pt will demonstrate increased MedX average isometric strength value by 15% from initial test resulting in improved ability to perform bending, lifting, and carrying activities safely, confidently. Appropriate and Ongoing  - Pt will be able to transfer from sit to stand without initial loss of balance of using knee strategy. Appropriate and Ongoing  - Pt able to perform HEP correctly with minimal cueing or supervision from therapist to encourage independent management of symptoms. Appropriate and Ongoing     Long term goals:  10 weeks or 20 visits   - Pt will demonstrate </= 10 seconds in TUG test Appropriate and Ongoing  - Pt able to demonstrate lateral weight shift with lifting toes into dorsiflexion without loss of balance  Appropriate and Ongoing  - Pt to demonstrate ability to independently balance in tandem stance bilaterally 30 seconds without use of hands  Appropriate and Ongoing  - Pt will demonstrate reduced pain and improved functional outcomes as reported on the Oswestry Disability Index by reaching a score of 10 or less in order to demonstrate subjective improvement in pt's condition.   Appropriate and Ongoing  - Pt will demonstrate independence with the HEP at discharge. Appropriate and Ongoing  - Pt able to walk up to 30 min without increase in back or leg pain/discomfort (patient goal) Appropriate and Ongoing    Plan     Continue with established Plan of Care towards established PT goals.     Therapist: Evin Mario, PT  9/22/2023

## 2023-09-29 ENCOUNTER — CLINICAL SUPPORT (OUTPATIENT)
Dept: REHABILITATION | Facility: OTHER | Age: 83
End: 2023-09-29
Payer: MEDICARE

## 2023-09-29 DIAGNOSIS — R68.89 DECREASED ACTIVITY TOLERANCE: Primary | ICD-10-CM

## 2023-09-29 DIAGNOSIS — R29.898 DECREASED STRENGTH OF TRUNK AND BACK: ICD-10-CM

## 2023-09-29 PROCEDURE — 97112 NEUROMUSCULAR REEDUCATION: CPT

## 2023-09-29 NOTE — PROGRESS NOTES
[facility]   Physical Therapy Treatment Note     Name: Oliver Burk Jr.  Clinic Number: [id number]    Therapy Diagnosis:   Encounter Diagnoses   Name Primary?    Decreased activity tolerance Yes    Decreased strength of trunk and back        Physician: Scott Menjivar MD    Visit Date: 2023    Physician Orders: PT Eval and Treat  Medical Diagnosis from Referral:   1. Back pain, unspecified back location, unspecified back pain laterality, unspecified chronicity    2. Lumbar radiculopathy       Evaluation Date: 2023  Authorization Period Expiration: 2024   Plan of Care Expiration: 2023  Reassessment Due: 10/19/2023  Visit # / Visits authorized:     PTA Visit #: 0/5     Time In: 7:00 am  Time Out: 8:00 am  Total Billable Time: 35 minutes  Total time: 60 min  INSURANCE and OUTCOMES: Fee for Service with FOTO Outcomes 1/3    Precautions: standard, HTN, and arthritis, A-Fib, Hx CA    Pattern of pain determined: 1 PEP    Subjective     Oliver Burk Jr. reports continues to feel weakness in legs but no back pain    Patient reports tolerating previous visit well  Patient reports their pain to be 0/10 on a 0-10 scale with 0 being no pain and 10 being the worst pain imaginable.  Pain Location: R knee     Occupation:   Leisure: Read, cards with wife, dog     Pt goals: Be able to walk longer without pain and be able to stand on toes.     Objective      Baseline Isometric Testing on Med X equipment: Testing administered by PT  Date of testin2023  ROM 0-48 deg   Max Peak Torque 180    Min Peak Torque 54    Flex/Ext Ratio 3.33:1   % below normative data 26     Outcomes:  Intake Score: 22%   Visit 5 Score:   Visit 10 Score:   Discharge Score:   Goal Score: 10%      2023  Tandem stance: 10 sec bilat  TU.31 sec  5x sit to stand 10.49 sec    Treatment     Oliver received the treatments listed below:        Oliver participated in neuromuscular re-education  "activities to improve balance, coordination, proprioception, motor control and/or posture for 45 minutes. The following activities were included:     Standing on Foam weight shift side/side  Standing on Foam weight shift front back  Standing on floor weight shift side/side  Standing on floor weight shift front/back    Side stepping over Blue TB 12x2 bilat (max assist)  Tandem stance balance 15 sec x3  Sit to stand with one foot on yoga block (seated on 1 airx) x10    Step ups into SLS 2x10x3" ea  Sit to stands 2x10    Retrograde ambulation 3x15 ft  Standing marching 3x1'  Hesitation marching x2'    Cone step overs fwd and sideways x5'      Not performed 9/29/2023 :    Standing on foam with ball toss  Sit to stand to airx foam (foam in chair)  Sit to stand without foam (foam in chair)      Oliver participated in therapeutic exercises to develop strength, endurance, ROM, flexibility, posture, and core stabilization for 10 minutes including:    Medx lumbar extension 80# x15 reps, 5 rep warm up at 60#  Medx trunk rotation x24# x20 ea    NP:  Cat camel x10  Prone press ups 15  Prone press ups 10 with manual overpressure      Completed Peripheral muscle strengthening which included one set of 15-20 repetitions at a slow and controlled 10-13 second per rep pace focused on strengthening supporting musculature in order to improve body mechanics and functional mobility. Patient and therapist focused on proper form during treatment to ensure optimal strengthening of each targeted muscle group.  Machines utilized include torso rotation, leg extension, leg curl, chest press, upright row. Tricep extension, bicep curl, leg press, and hip abduction added visit 3    Oliver participated in dynamic functional therapeutic activities to improve functional performance and simulate household and community activities for   minutes. The following activities were included:    Oliver received manual therapy techniques for 5  minutes. The " following activities were included:    Lumbar CPA's grade III/IV x5'    Pt given cold pack for 5 minutes to low back in z-lie.    Patient Education and Home Exercises     Home exercises include:  Prone press ups  Side stepping  Sit to stand  Cardio program (V5): -  Lifting education (V11): -  Posture/Lumbar roll: acquired  Fridge Magnet Discharge handout (date given): -  Equipment at home/gym membership: yes    Education provided:   - PT role and POC  - HEP      Written Home Exercises Provided: Patient instructed to cont prior HEP.  Exercises were reviewed and Oliver was able to demonstrate them prior to the end of the session.  Oliver demonstrated good  understanding of the education provided.     See EMR under Patient Instructions for exercises provided prior visit.    Assessment     Oliver presents today without complaints of pain. Due to lack of back symptoms and concerns over balance and falls, pts treatment will now focus on distal lower extremity strengthening and static and dynamic balance training. Reassessed TUG and 5x sit to stand with improved scores. Progressed dynamic LE strengthening and balance exercises with good tolerance and no adverse effects. Continue to progress as tolerated       Patient is making good progress towards established goals.  Pt will continue to benefit from skilled outpatient physical therapy to address the deficits stated in the impairment chart, provide pt/family education and to maximize pt's level of independence in the home and community environment.     Anticipated Barriers for therapy: unsteady gait  Pt's spiritual, cultural and educational needs considered and pt agreeable to plan of care and goals as stated below:     GOALS: Pt is in agreement with the following goals.     Short term goals:  6 weeks or 10 visits - Updated  - Pt will demonstrate </= 15 seconds in TUG test MET  - Pt able to demonstrate weight shift from toes to heels in standing without knee or hip  strategy Appropriate and Ongoing  - Pt will demonstrate increased MedX average isometric strength value by 15% from initial test resulting in improved ability to perform bending, lifting, and carrying activities safely, confidently. Appropriate and Ongoing  - Pt will be able to transfer from sit to stand without initial loss of balance of using knee strategy. Appropriate and Ongoing  - Pt able to perform HEP correctly with minimal cueing or supervision from therapist to encourage independent management of symptoms. Appropriate and Ongoing     Long term goals: 10 weeks or 20 visits   - Pt will demonstrate </= 8 seconds in TUG test Appropriate and Ongoing  - Pt able to demonstrate lateral weight shift with lifting toes into dorsiflexion without loss of balance  Appropriate and Ongoing  - Pt to demonstrate ability to independently balance in tandem stance bilaterally 30 seconds without use of hands  Appropriate and Ongoing  - Pt will demonstrate reduced pain and improved functional outcomes as reported on the Oswestry Disability Index by reaching a score of 10 or less in order to demonstrate subjective improvement in pt's condition.   Appropriate and Ongoing  - Pt will demonstrate independence with the HEP at discharge. Appropriate and Ongoing  - Pt able to walk up to 30 min without increase in back or leg pain/discomfort (patient goal) Appropriate and Ongoing    Plan     Continue with established Plan of Care towards established PT goals.     Therapist: Petr Simons, PT  9/29/2023

## 2023-10-04 ENCOUNTER — CLINICAL SUPPORT (OUTPATIENT)
Dept: REHABILITATION | Facility: OTHER | Age: 83
End: 2023-10-04
Payer: MEDICARE

## 2023-10-04 DIAGNOSIS — R29.898 DECREASED STRENGTH OF TRUNK AND BACK: ICD-10-CM

## 2023-10-04 DIAGNOSIS — I48.0 PAROXYSMAL ATRIAL FIBRILLATION: ICD-10-CM

## 2023-10-04 DIAGNOSIS — R68.89 DECREASED ACTIVITY TOLERANCE: Primary | ICD-10-CM

## 2023-10-04 PROCEDURE — 97110 THERAPEUTIC EXERCISES: CPT | Mod: CQ

## 2023-10-04 PROCEDURE — 97112 NEUROMUSCULAR REEDUCATION: CPT | Mod: CQ

## 2023-10-04 NOTE — PROGRESS NOTES
[facility]   Physical Therapy Treatment Note     Name: Oliver Burk Jr.  Clinic Number: [id number]    Therapy Diagnosis:   Encounter Diagnoses   Name Primary?    Decreased activity tolerance Yes    Decreased strength of trunk and back        Physician: Scott Menjivar MD    Visit Date: 10/4/2023    Physician Orders: PT Eval and Treat  Medical Diagnosis from Referral:   1. Back pain, unspecified back location, unspecified back pain laterality, unspecified chronicity    2. Lumbar radiculopathy       Evaluation Date: 2023  Authorization Period Expiration: 2024   Plan of Care Expiration: 2023  Reassessment Due: 10/19/2023  Visit # / Visits authorized:     PTA Visit #:      Time In: 703  Time Out: 0800  Total Billable Time: 53 minutes  Total time: 60 min  INSURANCE and OUTCOMES: Fee for Service with FOTO Outcomes 1/3    Precautions: standard, HTN, and arthritis, A-Fib, Hx CA    Pattern of pain determined: 1 PEP    Subjective     Oliver Burk Jr. He is doing well no pain. Feels some weakness and numbing in L knee sometimes. Has not had any recent falls    Patient reports tolerating previous visit well  Patient reports their pain to be 0/10 on a 0-10 scale with 0 being no pain and 10 being the worst pain imaginable.  Pain Location: R knee     Occupation:   Leisure: Read, cards with wife, dog     Pt goals: Be able to walk longer without pain and be able to stand on toes.     Objective      Baseline Isometric Testing on Med X equipment: Testing administered by PT  Date of testin2023  ROM 0-48 deg   Max Peak Torque 180    Min Peak Torque 54    Flex/Ext Ratio 3.33:1   % below normative data 26     Outcomes:  Intake Score: 22%   Visit 5 Score:   Visit 10 Score:   Discharge Score:   Goal Score: 10%      2023  Tandem stance: 10 sec bilat  TU.31 sec  5x sit to stand 10.49 sec    Treatment     Oliver received the treatments listed below:        Oliver  "participated in neuromuscular re-education activities to improve balance, coordination, proprioception, motor control and/or posture for 45 minutes. The following activities were included:     Standing on Foam weight shift side/side  Standing on Foam weight shift front back  Standing on floor weight shift side/side  Standing on floor weight shift front/back    Side stepping over Blue TB 12x2 bilat (max assist)  Tandem stance balance 15 sec x3  Sit to stand with one foot on yoga block (seated on 1 airx) x10    Step ups into SLS 2x10x3" ea  Sit to stands 2x10 5# KB 2x10   +Paloff Press RTB   Retrograde ambulation 3x15 ft  Standing marching 3x1'  Hesitation marching x2'    Cone step overs fwd and sideways x5'      Not performed 10/4/2023 :    Standing on foam with ball toss  Sit to stand to airx foam (foam in chair)  Sit to stand without foam (foam in chair)      Oliver participated in therapeutic exercises to develop strength, endurance, ROM, flexibility, posture, and core stabilization for 10 minutes including:    Medx lumbar extension 80# x15 reps, 5 rep warm up at 60#  Medx trunk rotation x24# x20 ea    NP:  Cat camel x10  Prone press ups 15  Prone press ups 10 with manual overpressure      Completed Peripheral muscle strengthening which included one set of 15-20 repetitions at a slow and controlled 10-13 second per rep pace focused on strengthening supporting musculature in order to improve body mechanics and functional mobility. Patient and therapist focused on proper form during treatment to ensure optimal strengthening of each targeted muscle group.  Machines utilized include torso rotation, leg extension, leg curl, chest press, upright row. Tricep extension, bicep curl, leg press, and hip abduction added visit 3    Oliver participated in dynamic functional therapeutic activities to improve functional performance and simulate household and community activities for   minutes. The following activities were " included:    Oliver received manual therapy techniques for 5  minutes. The following activities were included:    Lumbar CPA's grade III/IV x5'    Pt given cold pack for 5 minutes to low back in z-lie.    Patient Education and Home Exercises     Home exercises include:  Prone press ups  Side stepping  Sit to stand  Cardio program (V5): -  Lifting education (V11): -  Posture/Lumbar roll: acquired  Fridge Magnet Discharge handout (date given): -  Equipment at home/gym membership: yes    Education provided:   - PT role and POC  - HEP      Written Home Exercises Provided: Patient instructed to cont prior HEP.  Exercises were reviewed and Oliver was able to demonstrate them prior to the end of the session.  Oliver demonstrated good  understanding of the education provided.     See EMR under Patient Instructions for exercises provided prior visit.    Assessment     Oliver presents today without complaints of pain. Focused on LE, core strengthening and balance. Pt performs better with balance exercises when cued to control movements and set before going into next movement. Mod A for side stepping onto pad today. Added core strengthening paloff press which also challenged balance with anti rotational pull. Was able to progress LE functional strengthening.      Patient is making good progress towards established goals.  Pt will continue to benefit from skilled outpatient physical therapy to address the deficits stated in the impairment chart, provide pt/family education and to maximize pt's level of independence in the home and community environment.     Anticipated Barriers for therapy: unsteady gait  Pt's spiritual, cultural and educational needs considered and pt agreeable to plan of care and goals as stated below:     GOALS: Pt is in agreement with the following goals.     Short term goals:  6 weeks or 10 visits - Updated  - Pt will demonstrate </= 15 seconds in TUG test MET  - Pt able to demonstrate weight shift from  toes to heels in standing without knee or hip strategy Appropriate and Ongoing  - Pt will demonstrate increased MedX average isometric strength value by 15% from initial test resulting in improved ability to perform bending, lifting, and carrying activities safely, confidently. Appropriate and Ongoing  - Pt will be able to transfer from sit to stand without initial loss of balance of using knee strategy. Appropriate and Ongoing  - Pt able to perform HEP correctly with minimal cueing or supervision from therapist to encourage independent management of symptoms. Appropriate and Ongoing     Long term goals: 10 weeks or 20 visits   - Pt will demonstrate </= 8 seconds in TUG test Appropriate and Ongoing  - Pt able to demonstrate lateral weight shift with lifting toes into dorsiflexion without loss of balance  Appropriate and Ongoing  - Pt to demonstrate ability to independently balance in tandem stance bilaterally 30 seconds without use of hands  Appropriate and Ongoing  - Pt will demonstrate reduced pain and improved functional outcomes as reported on the Oswestry Disability Index by reaching a score of 10 or less in order to demonstrate subjective improvement in pt's condition.   Appropriate and Ongoing  - Pt will demonstrate independence with the HEP at discharge. Appropriate and Ongoing  - Pt able to walk up to 30 min without increase in back or leg pain/discomfort (patient goal) Appropriate and Ongoing    Plan     Continue with established Plan of Care towards established PT goals.     Therapist: Thanh Rodgers, PTA  10/4/2023

## 2023-10-09 DIAGNOSIS — I48.0 PAROXYSMAL ATRIAL FIBRILLATION: ICD-10-CM

## 2023-10-09 NOTE — TELEPHONE ENCOUNTER
ip   Quality 130: Documentation Of Current Medications In The Medical Record: Current Medications Documented Quality 226: Preventive Care And Screening: Tobacco Use: Screening And Cessation Intervention: Patient screened for tobacco use and is an ex/non-smoker Detail Level: Detailed Quality 431: Preventive Care And Screening: Unhealthy Alcohol Use - Screening: Patient not identified as an unhealthy alcohol user when screened for unhealthy alcohol use using a systematic screening method

## 2023-10-11 ENCOUNTER — TELEPHONE (OUTPATIENT)
Dept: INTERNAL MEDICINE | Facility: CLINIC | Age: 83
End: 2023-10-11
Payer: MEDICARE

## 2023-10-11 DIAGNOSIS — I48.0 PAROXYSMAL ATRIAL FIBRILLATION: ICD-10-CM

## 2023-10-15 DIAGNOSIS — I48.0 PAROXYSMAL ATRIAL FIBRILLATION: ICD-10-CM

## 2023-10-18 NOTE — PROGRESS NOTES
[facility]   Physical Therapy Treatment Note     Name: Oliver Burk Jr.  Clinic Number: [id number]    Therapy Diagnosis:   Encounter Diagnoses   Name Primary?    Decreased activity tolerance Yes    Decreased strength of trunk and back        Physician: Scott Menjivar MD    Visit Date: 10/19/2023    Physician Orders: PT Eval and Treat  Medical Diagnosis from Referral:   1. Back pain, unspecified back location, unspecified back pain laterality, unspecified chronicity    2. Lumbar radiculopathy       Evaluation Date: 2023  Authorization Period Expiration: 2024   Plan of Care Expiration: 2023  Reassessment Due: 10/19/2023  Visit # / Visits authorized:     PTA Visit #: 2     Time In: 700  Time Out: 800  Total Billable Time: 55 minutes  Total time: 60 min  INSURANCE and OUTCOMES: Fee for Service with FOTO Outcomes 1/3    Precautions: standard, HTN, and arthritis, A-Fib, Hx CA    Pattern of pain determined: 1 PEP    Subjective     Oliver Burk Jr. He is doing well. No pain. Still having balance issues, hasn't seen much improvement     Patient reports tolerating previous visit well  Patient reports their pain to be 0/10 on a 0-10 scale with 0 being no pain and 10 being the worst pain imaginable.  Pain Location: R knee     Occupation:   Leisure: Read, cards with wife, dog     Pt goals: Be able to walk longer without pain and be able to stand on toes.     Objective      Baseline Isometric Testing on Med X equipment: Testing administered by PT  Date of testin2023  ROM 0-48 deg   Max Peak Torque 180    Min Peak Torque 54    Flex/Ext Ratio 3.33:1   % below normative data 26     Outcomes:  Intake Score: 22%   Visit 5 Score:   Visit 10 Score:   Discharge Score:   Goal Score: 10%      2023  Tandem stance: 10 sec bilat  TU.31 sec  5x sit to stand 10.49 sec    Treatment     Oliver received the treatments listed below:        Oliver participated in  "neuromuscular re-education activities to improve balance, coordination, proprioception, motor control and/or posture for 55 minutes. The following activities were included:     Standing on Foam weight shift side/side  Standing on Foam weight shift front back  Standing on floor weight shift side/side  Standing on floor weight shift front/back    Side stepping over Blue TB 12x2 bilat (max assist)  Tandem stance balance 15 sec x3  Sit to stand with one foot on yoga block (seated on 1 airx) x10  +Gait around facility 2 laps x2  Step ups into SLS 2x10x3" ea  Sit to stands 2x10 5# KB 2x10   +Paloff Press RTB   Retrograde ambulation 3x15 ft-np  Standing marching 3x1'-np  Hesitation marching x2'-np    Cone step overs fwd and sideways x5'-np      Not performed 10/19/2023 :    Standing on foam with ball toss  Sit to stand to airx foam (foam in chair)  Sit to stand without foam (foam in chair)      Oliver participated in therapeutic exercises to develop strength, endurance, ROM, flexibility, posture, and core stabilization for 00 minutes including:    Medx lumbar extension 80# x15 reps, 5 rep warm up at 60#  Medx trunk rotation x24# x20 ea    NP:  Cat camel x10  Prone press ups 15  Prone press ups 10 with manual overpressure      Completed Peripheral muscle strengthening which included one set of 15-20 repetitions at a slow and controlled 10-13 second per rep pace focused on strengthening supporting musculature in order to improve body mechanics and functional mobility. Patient and therapist focused on proper form during treatment to ensure optimal strengthening of each targeted muscle group.  Machines utilized include torso rotation, leg extension, leg curl, chest press, upright row. Tricep extension, bicep curl, leg press, and hip abduction added visit 3    Oliver participated in dynamic functional therapeutic activities to improve functional performance and simulate household and community activities for   minutes. The " following activities were included:    Oliver received manual therapy techniques for 5  minutes. The following activities were included:    Lumbar CPA's grade III/IV x5'    Pt given cold pack for 5 minutes to low back in z-lie.    Patient Education and Home Exercises     Home exercises include:  Prone press ups  Side stepping  Sit to stand  Cardio program (V5): -  Lifting education (V11): -  Posture/Lumbar roll: acquired  Fridge Magnet Discharge handout (date given): -  Equipment at home/gym membership: yes    Education provided:   - PT role and POC  - HEP      Written Home Exercises Provided: Patient instructed to cont prior HEP.  Exercises were reviewed and Oliver was able to demonstrate them prior to the end of the session.  Oliver demonstrated good  understanding of the education provided.     See EMR under Patient Instructions for exercises provided prior visit.    Assessment     Oliver presents today without complaints of pain, but still having difficulty with balance. Balance and LE weakness is discouraging to pt, limiting his willingness to walk/perform functional exercises. Pt educated on importance of performing functional strengthening exercises to improve strength, balance, confidence.     Patient is making good progress towards established goals.  Pt will continue to benefit from skilled outpatient physical therapy to address the deficits stated in the impairment chart, provide pt/family education and to maximize pt's level of independence in the home and community environment.     Anticipated Barriers for therapy: unsteady gait  Pt's spiritual, cultural and educational needs considered and pt agreeable to plan of care and goals as stated below:     GOALS: Pt is in agreement with the following goals.     Short term goals:  6 weeks or 10 visits - Updated  - Pt will demonstrate </= 15 seconds in TUG test MET  - Pt able to demonstrate weight shift from toes to heels in standing without knee or hip strategy  Appropriate and Ongoing  - Pt will demonstrate increased MedX average isometric strength value by 15% from initial test resulting in improved ability to perform bending, lifting, and carrying activities safely, confidently. Appropriate and Ongoing  - Pt will be able to transfer from sit to stand without initial loss of balance of using knee strategy. Appropriate and Ongoing  - Pt able to perform HEP correctly with minimal cueing or supervision from therapist to encourage independent management of symptoms. Appropriate and Ongoing     Long term goals: 10 weeks or 20 visits   - Pt will demonstrate </= 8 seconds in TUG test Appropriate and Ongoing  - Pt able to demonstrate lateral weight shift with lifting toes into dorsiflexion without loss of balance  Appropriate and Ongoing  - Pt to demonstrate ability to independently balance in tandem stance bilaterally 30 seconds without use of hands  Appropriate and Ongoing  - Pt will demonstrate reduced pain and improved functional outcomes as reported on the Oswestry Disability Index by reaching a score of 10 or less in order to demonstrate subjective improvement in pt's condition.   Appropriate and Ongoing  - Pt will demonstrate independence with the HEP at discharge. Appropriate and Ongoing  - Pt able to walk up to 30 min without increase in back or leg pain/discomfort (patient goal) Appropriate and Ongoing    Plan     Continue with established Plan of Care towards established PT goals.     Therapist: Thanh Rodgers, PTA  10/18/2023

## 2023-10-19 ENCOUNTER — CLINICAL SUPPORT (OUTPATIENT)
Dept: REHABILITATION | Facility: OTHER | Age: 83
End: 2023-10-19
Payer: MEDICARE

## 2023-10-19 DIAGNOSIS — R29.898 DECREASED STRENGTH OF TRUNK AND BACK: ICD-10-CM

## 2023-10-19 DIAGNOSIS — R68.89 DECREASED ACTIVITY TOLERANCE: Primary | ICD-10-CM

## 2023-10-19 PROCEDURE — 97112 NEUROMUSCULAR REEDUCATION: CPT | Mod: CQ

## 2023-10-25 ENCOUNTER — TELEPHONE (OUTPATIENT)
Dept: INTERNAL MEDICINE | Facility: CLINIC | Age: 83
End: 2023-10-25
Payer: MEDICARE

## 2023-10-26 ENCOUNTER — DOCUMENTATION ONLY (OUTPATIENT)
Dept: REHABILITATION | Facility: OTHER | Age: 83
End: 2023-10-26
Payer: MEDICARE

## 2023-10-26 NOTE — TELEPHONE ENCOUNTER
We please call the DME company that the patients cpap equipment as HE IS NOT USING IT AND WOULD LIKE SOMEONE TO COME PICK IT UP

## 2023-10-26 NOTE — PROGRESS NOTES
Patient no call/no show.     PT, [place], call patient.  Patient report unable to attended 8:00 am appointment today due to work conflict.  This is patient's 3rd no show since 09/27/23.  Patient request 7:00 am appointments.      Patient alerted that he does not have any future appointments scheduled and advised to call in to make appointments.  Patient verbalize understanding.          [place]ino Ricks, PT

## 2023-11-01 ENCOUNTER — HOSPITAL ENCOUNTER (OUTPATIENT)
Dept: RADIOLOGY | Facility: HOSPITAL | Age: 83
Discharge: HOME | End: 2023-11-01
Attending: NEUROLOGICAL SURGERY
Payer: MEDICARE

## 2023-11-01 ENCOUNTER — OFFICE VISIT (OUTPATIENT)
Dept: NEUROSURGERY | Facility: CLINIC | Age: 83
End: 2023-11-01
Payer: MEDICARE

## 2023-11-01 DIAGNOSIS — M54.9 BACK PAIN, UNSPECIFIED BACK LOCATION, UNSPECIFIED BACK PAIN LATERALITY, UNSPECIFIED CHRONICITY: ICD-10-CM

## 2023-11-01 DIAGNOSIS — M54.9 BACK PAIN, UNSPECIFIED BACK LOCATION, UNSPECIFIED BACK PAIN LATERALITY, UNSPECIFIED CHRONICITY: Primary | ICD-10-CM

## 2023-11-01 DIAGNOSIS — M54.16 LUMBAR RADICULOPATHY: ICD-10-CM

## 2023-11-01 PROCEDURE — 99214 PR OFFICE/OUTPT VISIT, EST, LEVL IV, 30-39 MIN: ICD-10-PCS | Mod: S$GLB,,, | Performed by: NEUROLOGICAL SURGERY

## 2023-11-01 PROCEDURE — 72120 X-RAY BEND ONLY L-S SPINE: CPT | Mod: TC,FY

## 2023-11-01 PROCEDURE — 72120 X-RAY BEND ONLY L-S SPINE: CPT | Mod: 26,,, | Performed by: RADIOLOGY

## 2023-11-01 PROCEDURE — 72120 XR LUMBAR SPINE FLEXION AND EXTENSION ONLY: ICD-10-PCS | Mod: 26,,, | Performed by: RADIOLOGY

## 2023-11-01 PROCEDURE — 99214 OFFICE O/P EST MOD 30 MIN: CPT | Mod: S$GLB,,, | Performed by: NEUROLOGICAL SURGERY

## 2023-11-01 PROCEDURE — 72131 CT LUMBAR SPINE WITHOUT CONTRAST: ICD-10-PCS | Mod: 26,,, | Performed by: RADIOLOGY

## 2023-11-01 PROCEDURE — 72131 CT LUMBAR SPINE W/O DYE: CPT | Mod: 26,,, | Performed by: RADIOLOGY

## 2023-11-01 PROCEDURE — 72131 CT LUMBAR SPINE W/O DYE: CPT | Mod: TC

## 2023-11-01 NOTE — PROGRESS NOTES
Subjective:   I, Rina Jim, attest that this documentation has been prepared under the direction and in the presence of Scott Menjivar MD.     Patient ID: Oliver Burk Jr. is a 83 y.o. male     Chief Complaint: No chief complaint on file.      HPI  Mr. Oliver Burk Jr. is a 83 y.o. gentleman with PMHx HTN, HLD, Afib on xarelto and metropelol who presents today for 3 month follow up. This is a pt who reported acute gait dysfunction after being diagnosed with Afib. He has also noted an associated pain to the bilateral knees. Described as an aching sensation. He has been attempting to massage his knee caps for relief. His pain does not radiate, although he does note intermittent numbness to his R foot/toes. He states this is chronic and unchanged. He has been seen by Pain Management. He reports physical limitations 2/2 pain.    Today the pt reports he is doing well. He is happy to report that he is not exhibiting any back pain. Denies extremity weakness, numbness, SA, b/b incontinence. He is ambulating with a cane at baseline. He continues to partake in physical therapy with improvement. Good functional status.    Review of Systems   Constitutional:  Negative for activity change, appetite change, fatigue, fever and unexpected weight change.   HENT:  Negative for facial swelling.    Eyes: Negative.    Respiratory: Negative.     Cardiovascular: Negative.    Gastrointestinal:  Negative for diarrhea, nausea and vomiting.   Endocrine: Negative.    Genitourinary: Negative.    Musculoskeletal:  Negative for back pain, joint swelling, myalgias and neck pain.   Neurological:  Negative for dizziness, seizures, weakness, numbness and headaches.   Psychiatric/Behavioral: Negative.          Past Medical History:   Diagnosis Date    Arthritis     Atrial fibrillation     Basal cell carcinoma     Cancer     prostate    Hyperlipidemia     Hypertension     on medication       Objective:    There were no vitals filed for  this visit.   Physical Exam  Constitutional:       General: He is not in acute distress.     Appearance: Normal appearance.   HENT:      Head: Normocephalic and atraumatic.   Pulmonary:      Effort: Pulmonary effort is normal.   Musculoskeletal:      Cervical back: Neck supple.   Neurological:      Mental Status: He is alert and oriented to person, place, and time.      GCS: GCS eye subscore is 4. GCS verbal subscore is 5. GCS motor subscore is 6.      Cranial Nerves: No cranial nerve deficit.         I, Dr. Scott Menjivar, personally performed the services described in this documentation. All medical record entries made by the scribe, Rina Jim, were at my direction and in my presence.  I have reviewed the chart and agree that the record reflects my personal performance and is accurate and complete. Scott Menjivar MD. 11/01/2023    Assessment:       Lumbar spondylosis.     Plan:   Pt released from my care at this time and advised to contact us with any questions, concerns, or if he experiences any new or worsening symptoms.

## 2023-11-01 NOTE — PATIENT INSTRUCTIONS
Pt released from my care at this time and advised to contact us with any questions, concerns, or if he experiences any new or worsening symptoms.

## 2023-11-20 ENCOUNTER — TELEPHONE (OUTPATIENT)
Dept: INTERNAL MEDICINE | Facility: CLINIC | Age: 83
End: 2023-11-20
Payer: MEDICARE

## 2023-11-21 NOTE — TELEPHONE ENCOUNTER
Can we confirm that his sleep apnea equipment was picked up   I would first call the patient and check then [facility] if not picked up

## 2023-12-06 DIAGNOSIS — E78.5 DYSLIPIDEMIA, GOAL LDL BELOW 100: ICD-10-CM

## 2023-12-06 DIAGNOSIS — N40.0 BENIGN PROSTATIC HYPERPLASIA, UNSPECIFIED WHETHER LOWER URINARY TRACT SYMPTOMS PRESENT: ICD-10-CM

## 2023-12-06 RX ORDER — VERAPAMIL HYDROCHLORIDE 120 MG/1
120 CAPSULE, EXTENDED RELEASE ORAL DAILY
Qty: 90 CAPSULE | Refills: 3 | Status: SHIPPED | OUTPATIENT
Start: 2023-12-06 | End: 2024-03-14 | Stop reason: SDUPTHER

## 2023-12-06 RX ORDER — SIMVASTATIN 20 MG/1
20 TABLET, FILM COATED ORAL NIGHTLY
Qty: 90 TABLET | Refills: 3 | Status: SHIPPED | OUTPATIENT
Start: 2023-12-06 | End: 2024-01-30 | Stop reason: SDUPTHER

## 2023-12-06 RX ORDER — ALPRAZOLAM 0.25 MG/1
TABLET ORAL
Qty: 30 TABLET | Refills: 1 | Status: SHIPPED | OUTPATIENT
Start: 2023-12-06 | End: 2024-01-30 | Stop reason: SDUPTHER

## 2023-12-06 RX ORDER — TAMSULOSIN HYDROCHLORIDE 0.4 MG/1
1 CAPSULE ORAL NIGHTLY
Qty: 90 CAPSULE | Refills: 3 | Status: SHIPPED | OUTPATIENT
Start: 2023-12-06 | End: 2024-03-14 | Stop reason: SDUPTHER

## 2024-01-15 ENCOUNTER — TELEPHONE (OUTPATIENT)
Dept: INTERNAL MEDICINE | Facility: CLINIC | Age: 84
End: 2024-01-15
Payer: MEDICARE

## 2024-01-15 DIAGNOSIS — I48.0 PAROXYSMAL ATRIAL FIBRILLATION: ICD-10-CM

## 2024-01-30 ENCOUNTER — TELEPHONE (OUTPATIENT)
Dept: INTERNAL MEDICINE | Facility: CLINIC | Age: 84
End: 2024-01-30
Payer: MEDICARE

## 2024-01-30 DIAGNOSIS — E78.5 DYSLIPIDEMIA, GOAL LDL BELOW 100: ICD-10-CM

## 2024-01-30 RX ORDER — ALPRAZOLAM 0.25 MG/1
TABLET ORAL
Qty: 30 TABLET | Refills: 2 | Status: SHIPPED | OUTPATIENT
Start: 2024-01-30 | End: 2024-04-04 | Stop reason: SDUPTHER

## 2024-01-30 RX ORDER — SIMVASTATIN 20 MG/1
20 TABLET, FILM COATED ORAL NIGHTLY
Qty: 90 TABLET | Refills: 3 | Status: SHIPPED | OUTPATIENT
Start: 2024-01-30 | End: 2024-03-14 | Stop reason: SDUPTHER

## 2024-02-12 ENCOUNTER — TELEPHONE (OUTPATIENT)
Dept: INTERNAL MEDICINE | Facility: CLINIC | Age: 84
End: 2024-02-12
Payer: MEDICARE

## 2024-02-12 DIAGNOSIS — K64.9 HEMORRHOIDS, UNSPECIFIED HEMORRHOID TYPE: Primary | ICD-10-CM

## 2024-02-14 ENCOUNTER — TELEPHONE (OUTPATIENT)
Dept: SURGERY | Facility: CLINIC | Age: 84
End: 2024-02-14
Payer: MEDICARE

## 2024-02-14 NOTE — TELEPHONE ENCOUNTER
Placed call to patient requesting an earlier appt with Dr. Navarrete.    Pt was referred to CRS for hemorrhoids, unspecified type.    Provided pt appt with NP's soonest available appt for possible in-clinic banding procedure.   Pt was scheduled for agreed upon date & time for appointment.    No additional needs or requests at this time.          ----- Message from Roya Richmond sent at 2/14/2024  4:46 PM CST -----  Regarding: Hemorrhoids  Hemorrhoids    2/14/24-Pt called to schedule appt but appt has been scheduled for 3/27/24. Pt wants a call back to discuss what the appt intails. He wants to have the procedure done asap. Please contact pt at [phone].

## 2024-02-20 ENCOUNTER — OFFICE VISIT (OUTPATIENT)
Dept: INTERNAL MEDICINE | Facility: CLINIC | Age: 84
End: 2024-02-20
Payer: MEDICARE

## 2024-02-20 ENCOUNTER — OFFICE VISIT (OUTPATIENT)
Dept: SURGERY | Facility: CLINIC | Age: 84
End: 2024-02-20
Payer: MEDICARE

## 2024-02-20 VITALS
DIASTOLIC BLOOD PRESSURE: 75 MMHG | HEIGHT: 72 IN | BODY MASS INDEX: 25.77 KG/M2 | SYSTOLIC BLOOD PRESSURE: 117 MMHG | WEIGHT: 190.25 LBS | HEART RATE: 84 BPM

## 2024-02-20 DIAGNOSIS — K64.8 HEMORRHOID PROLAPSE: Primary | ICD-10-CM

## 2024-02-20 DIAGNOSIS — I48.20 CHRONIC ATRIAL FIBRILLATION: Primary | ICD-10-CM

## 2024-02-20 DIAGNOSIS — K64.9 HEMORRHOIDS, UNSPECIFIED HEMORRHOID TYPE: ICD-10-CM

## 2024-02-20 DIAGNOSIS — M48.062 NEUROGENIC CLAUDICATION DUE TO LUMBAR SPINAL STENOSIS: ICD-10-CM

## 2024-02-20 PROCEDURE — 99999 PR PBB SHADOW E&M-EST. PATIENT-LVL II: CPT | Mod: PBBFAC,,, | Performed by: INTERNAL MEDICINE

## 2024-02-20 PROCEDURE — 99212 OFFICE O/P EST SF 10 MIN: CPT | Mod: PBBFAC,25,27 | Performed by: INTERNAL MEDICINE

## 2024-02-20 PROCEDURE — 99214 OFFICE O/P EST MOD 30 MIN: CPT | Mod: 25,S$PBB,, | Performed by: NURSE PRACTITIONER

## 2024-02-20 PROCEDURE — 99214 OFFICE O/P EST MOD 30 MIN: CPT | Mod: PBBFAC,25 | Performed by: NURSE PRACTITIONER

## 2024-02-20 PROCEDURE — 99213 OFFICE O/P EST LOW 20 MIN: CPT | Mod: S$PBB,,, | Performed by: INTERNAL MEDICINE

## 2024-02-20 PROCEDURE — 99999 PR PBB SHADOW E&M-EST. PATIENT-LVL IV: CPT | Mod: PBBFAC,,, | Performed by: NURSE PRACTITIONER

## 2024-02-20 PROCEDURE — 46600 DIAGNOSTIC ANOSCOPY SPX: CPT | Mod: PBBFAC | Performed by: NURSE PRACTITIONER

## 2024-02-20 PROCEDURE — 46600 DIAGNOSTIC ANOSCOPY SPX: CPT | Mod: S$PBB,,, | Performed by: NURSE PRACTITIONER

## 2024-02-20 RX ORDER — HYDROCORTISONE 25 MG/G
CREAM TOPICAL 2 TIMES DAILY
Qty: 28 G | Refills: 1 | Status: SHIPPED | OUTPATIENT
Start: 2024-02-20 | End: 2024-02-24 | Stop reason: SDUPTHER

## 2024-02-20 NOTE — PATIENT INSTRUCTIONS
Start fiber supplement such as Fibercon (capsule) or Citrucel, increase as tolerated per  instructions to achieve formed, soft easy to pass stools without straining  Water intake of 64 oz per day  May use prescribed cortisone cream to hemorrhoids twice a day not to exceed more than 2 weeks at a time. Take 2 week medication vacation holiday then resume as needed.  Warm sitz baths as needed  Do not sit on the toilet for more than 5 mins at a time

## 2024-02-20 NOTE — PROGRESS NOTES
CRS Office Visit History and Physical    Referring Md:   No address on file    SUBJECTIVE:     Chief Complaint: hemorrhoids    History of Present Illness:  The patient is new patient to this practice.   Course is as follows:  Patient is a 83 y.o. male w recent dx of afib on Xarelto w/in the last 6 mos presents with hemorrhoids. He is interested in RBL if appropriate  Symptoms have been present for 50 years.  He reports his prolapsing hemorrhoid reduces. No pain or bleeding  confirms occasional straining/prolonged time on toilet with bowel movements.  is not currently taking fiber supplement or stool softener  Blood thinners: Yes    Last Colonoscopy: at [facility] many years ago, likely had polypectomy   Family history of colorectal cancer or IBD: no.    Review of patient's allergies indicates:  No Known Allergies    Past Medical History:   Diagnosis Date    Arthritis     Atrial fibrillation     Basal cell carcinoma     Cancer     prostate    Hyperlipidemia     Hypertension     on medication     Past Surgical History:   Procedure Laterality Date    APPENDECTOMY      CATARACT EXTRACTION Right 01/14/2019    Dr. Mendez (symfony lens)    CATARACT EXTRACTION W/  INTRAOCULAR LENS IMPLANT Right 01/14/2019    Procedure: EXTRACTION, CATARACT, WITH IOL INSERTION;  Surgeon: Mu Mendez MD;  Location: [facility];  Service: Ophthalmology;  Laterality: Right;  Laser    CATARACT EXTRACTION W/  INTRAOCULAR LENS IMPLANT Left 01/28/2019    Procedure: EXTRACTION, CATARACT, WITH IOL INSERTION;  Surgeon: Mu Mendez MD;  Location: [facility];  Service: Ophthalmology;  Laterality: Left;  Laser    EYE SURGERY      LUMBAR LAMINECTOMY  2004 2019 dr pike    PROSTATE SURGERY  2010    prostate cancer - radiation seeds- low grade    REPAIR, HERNIA, INGUINAL, WITHOUT HISTORY OF PRIOR REPAIR, AGE 5 YEARS OR OLDER Left 02/24/2023    Procedure: REPAIR, HERNIA, INGUINAL, WITHOUT HISTORY OF PRIOR REPAIR, AGE 5  YEARS OR OLDER (WITH MESH);   "Surgeon: Richar Stewart MD;  [facility];  Service: General;  Laterality: Left;    TREATMENT OF CARDIAC ARRHYTHMIA N/A 10/17/2022    Procedure: CARDIOVERSION;  Surgeon: ABDULLAHI Spangler MD;  Location: [facility];  Service: Cardiology;  Laterality: N/A;  afib, RAINER, DCCV, anes, DM, 3 Prep     Family History   Problem Relation Age of Onset    COPD Mother     Osteoporosis Mother     Cancer Father         pancreas?    Heart disease Father     Heart failure Father     Glaucoma Neg Hx     Blindness Neg Hx     Cataracts Neg Hx     Diabetes Neg Hx      Social History     Tobacco Use    Smoking status: Never    Smokeless tobacco: Never   Substance Use Topics    Alcohol use: Yes     Alcohol/week: 12.0 standard drinks of alcohol     Types: 5 Shots of liquor, 7 Standard drinks or equivalent per week     Comment: 1 drink per night    Drug use: No        Review of Systems:  ROS    OBJECTIVE:     Vital Signs (Most Recent)  /75 (BP Location: Left arm, Patient Position: Sitting)   Pulse 84   Ht 6' 0.01" (1.829 m)   Wt 86.3 kg (190 lb 4.1 oz)   BMI 25.80 kg/m²     Physical Exam:  General: White male in no distress   Neuro: Alert and oriented to person, place, and time.  Moves all extremities.     HEENT: No icterus.  Trachea midline  Respiratory: Respirations are even and unlabored, no cough or audible wheezing  Skin: Warm dry and intact, No visible rashes, no jaundice    Labs reviewed today:  Lab Results   Component Value Date    WBC 9.21 07/24/2023    HGB 15.6 07/24/2023    HCT 47.5 07/24/2023     07/24/2023    CHOL 116 (L) 07/24/2023    TRIG 49 07/24/2023    HDL 49 07/24/2023    ALT 15 07/24/2023    AST 15 07/24/2023     07/24/2023    K 4.4 07/24/2023     07/24/2023    CREATININE 0.8 07/24/2023    BUN 11 07/24/2023    CO2 26 07/24/2023    TSH 1.260 07/24/2023    PSA <0.01 07/24/2023    INR 1.2 10/17/2022    HGBA1C 5.9 (H) 07/24/2023         Anorectal Exam:    Anal Skin: " appearance of possible prolapsing hemorrhoids, but no internal component present    Digital Rectal Exam:  Resting Tone normal  Mass none  Tenderness  absent    Anoscopy:  Verbal consent was obtained.   Clear plastic anoscope inserted.    Hemorrhoids  present  Stigmata of bleeding  absent  Stigmata of prolapsed  present  Distal rectal mucosa normal  Loose/near watery stool in rectum        ASSESSMENT/PLAN:     Diagnoses and all orders for this visit:    Hemorrhoid prolapse  -     hydrocortisone (ANUSOL-HC) 2.5 % rectal cream; Place rectally 2 (two) times daily.    83 ny.o. M w prolapsing hemorrhoids. We discussed RBL. But hemorrhoids aren't painful and do not bleed. Just placed on Xarelto for afib <10 mos ago so I do not think it is a good idea to perform RBL as this requires holding xarelto.   For now, anusol and fiber and f/u PRN        Sue Boone, ISAIAS-DAYRON  Colon and Rectal Surgery

## 2024-02-23 NOTE — PROGRESS NOTES
" Subjective:       Patient ID: Oliver Burk Jr. is a 83 y.o. male who presents today for:    Chief Complaint:   Chief Complaint   Patient presents with    Atrial Fibrillation       HPI:  Pleasant 83-year-old nonsmoking gentleman with a history of chronic atrial fibrillation, BPH, dyslipidemia, insomnia and mild ROMEO is here for concerns of elevated heart rate around mid morning and occasionally during the day.  Heart rate can get as high as 110 into the teens at rest and this was somewhat concerning to him.  He has a fit bit watch and it never goes at rest higher than 120.  Does not check it when he works out naturally.  He has not having any symptoms of shortness of breath, dyspnea on exertion, substernal chest pain or appreciating a rapid heart rate.      Review of Systems   Constitutional:  Negative for chills, fever and weight loss.   HENT:  Negative for sore throat.    Eyes:  Negative for blurred vision and double vision.   Respiratory:  Negative for cough and shortness of breath.    Cardiovascular:  Negative for chest pain and palpitations.   Gastrointestinal:  Negative for constipation, diarrhea, nausea and vomiting.        Rectal tag/"polyp"   Genitourinary:  Negative for dysuria and hematuria.   Musculoskeletal:  Negative for joint pain and myalgias.   Skin:  Negative for itching and rash.   Neurological:  Negative for sensory change, focal weakness and headaches.   Endo/Heme/Allergies:  Does not bruise/bleed easily.   Psychiatric/Behavioral:  Negative for depression and suicidal ideas.         Medications:  Outpatient Encounter Medications as of 2/20/2024   Medication Sig Note Dispense Refill    ALPRAZolam (XANAX) 0.25 MG tablet Take one tablet hs for sleep  30 tablet 2    ascorbic acid, vitamin C, (VITAMIN C) 250 MG tablet Take 250 mg by mouth once daily.       azelastine (ASTELIN) 137 mcg (0.1 %) nasal spray 1 spray (137 mcg total) by Nasal route 2 (two) times daily.  30 mL 2    co-enzyme Q-10 30 mg " capsule Take 30 mg by mouth 3 (three) times daily.       diclofenac sodium (VOLTAREN) 1 % Gel Apply 1 Tube topically 2 (two) times daily as needed. 2/23/2023: Continue to hold until after Surgery.         eszopiclone (LUNESTA) 1 MG Tab Take 1 tablet (1 mg total) by mouth nightly as needed.  30 tablet 2    hydrocortisone (ANUSOL-HC) 2.5 % rectal cream Place rectally 2 (two) times daily.  28 g 1    Lactobac no.41/Bifidobact no.7 (PROBIOTIC-10 ORAL) Take by mouth.       metoprolol succinate (TOPROL-XL) 50 MG 24 hr tablet TAKE 1 TABLET(50 MG) BY MOUTH TWICE DAILY  180 tablet 3    rivaroxaban (XARELTO) 20 mg Tab Take 1 tablet (20 mg total) by mouth daily with dinner or evening meal.  90 tablet 3    simvastatin (ZOCOR) 20 MG tablet Take 1 tablet (20 mg total) by mouth every evening.  90 tablet 3    tamsulosin (FLOMAX) 0.4 mg Cap Take 1 capsule (0.4 mg total) by mouth every evening.  90 capsule 3    verapamiL (VERELAN) 120 MG C24P Take 1 capsule (120 mg total) by mouth once daily.  90 capsule 3    vitamin D (VITAMIN D3) 1000 units Tab Take 1,000 Units by mouth once daily.       zolpidem (AMBIEN) 5 MG Tab Take 1 tablet (5 mg total) by mouth nightly as needed (Insomnia).  30 tablet 2     No facility-administered encounter medications on file as of 2/20/2024.       Allergies:  Review of patient's allergies indicates:  No Known Allergies    Health Maintenance:  Immunization History   Administered Date(s) Administered    COVID-19, MRNA, LN-S, PF (Pfizer) (Gray Cap) 07/27/2022    COVID-19, MRNA, LN-S, PF (Pfizer) (Purple Cap) 01/09/2021, 01/30/2021, 08/15/2021    Hepatitis A, Adult 12/09/2008    IPV 12/09/2008    Influenza (FLUAD) - Quadrivalent - Adjuvanted - PF *Preferred* (65+) 11/28/2021, 11/19/2022, 09/08/2023    Influenza - High Dose - PF (65 years and older) 10/12/2016    Influenza - Quadrivalent - High Dose - PF (65 years and older) 11/15/2020    Pneumococcal Conjugate - 13 Valent 07/24/2023    Typhoid - ViCPs 12/09/2008       Health Maintenance   Topic Date Due    Shingles Vaccine (1 of 2) Never done    TETANUS VACCINE  12/09/2018    Lipid Panel  07/24/2028          Objective:       ]    Physical Exam  Constitutional:       General: He is not in acute distress.     Appearance: Normal appearance. He is well-developed. He is not diaphoretic.   HENT:      Head: Normocephalic and atraumatic.      Right Ear: External ear normal.      Left Ear: External ear normal.      Nose: Nose normal.   Eyes:      General: No scleral icterus.        Right eye: No discharge.         Left eye: No discharge.      Conjunctiva/sclera: Conjunctivae normal.   Neck:      Thyroid: No thyromegaly.      Vascular: No JVD.      Trachea: No tracheal deviation.   Cardiovascular:      Rate and Rhythm: Normal rate. Rhythm regularly irregular.      Heart sounds: Normal heart sounds. No murmur heard.     No gallop.   Pulmonary:      Effort: Pulmonary effort is normal. No respiratory distress.      Breath sounds: Normal breath sounds. No wheezing or rales.   Abdominal:      Palpations: Abdomen is soft.   Musculoskeletal:         General: No tenderness. Normal range of motion.      Cervical back: Normal range of motion and neck supple.      Right lower leg: No edema.      Left lower leg: No edema.   Lymphadenopathy:      Cervical: No cervical adenopathy.   Skin:     General: Skin is warm and dry.      Findings: No erythema or rash.   Neurological:      Mental Status: He is alert and oriented to person, place, and time.   Psychiatric:         Behavior: Behavior normal.         Thought Content: Thought content normal.         Judgment: Judgment normal.        Lab Results   Component Value Date    WBC 9.21 07/24/2023    HGB 15.6 07/24/2023    HCT 47.5 07/24/2023     07/24/2023    CHOL 116 (L) 07/24/2023    TRIG 49 07/24/2023    HDL 49 07/24/2023    ALT 15 07/24/2023    AST 15 07/24/2023     07/24/2023    K 4.4 07/24/2023     07/24/2023    CREATININE 0.8  07/24/2023    BUN 11 07/24/2023    CO2 26 07/24/2023    TSH 1.260 07/24/2023    PSA <0.01 07/24/2023    INR 1.2 10/17/2022    HGBA1C 5.9 (H) 07/24/2023       Assessment/plan:     Oliver Burk Jr. is a 83 y.o.male with:    Chronic atrial fibrillation  Comments:  90% rate controlled patient does exercise daily.  Continue metoprolol 50 mg twice a day and verapamil 120 CD daily  Orders:  -     SCHEDULED EKG 12-LEAD (to Muse); Future    Hemorrhoids, unspecified hemorrhoid type  Comments:  Just saw Colorectal surgery.  Patient will add fiber supplements and use ointment; hoping to defer surgery.    Neurogenic claudication due to lumbar spinal stenosis  Comments:  Off gabapentin.  Now using a cane due to decreased proprioception    Elevated hemoglobin A1c in July-5.9%.  Need to recheck with next set of blood work.    No future appointments.    Maria Isabel Osborne MD  [facility]

## 2024-02-24 DIAGNOSIS — K64.8 HEMORRHOID PROLAPSE: Primary | ICD-10-CM

## 2024-02-24 RX ORDER — HYDROCORTISONE 25 MG/G
CREAM TOPICAL 2 TIMES DAILY
Qty: 28 G | Refills: 1 | Status: SHIPPED | OUTPATIENT
Start: 2024-02-24

## 2024-02-27 DIAGNOSIS — Z00.00 ENCOUNTER FOR MEDICARE ANNUAL WELLNESS EXAM: ICD-10-CM

## 2024-03-14 ENCOUNTER — TELEPHONE (OUTPATIENT)
Dept: INTERNAL MEDICINE | Facility: CLINIC | Age: 84
End: 2024-03-14
Payer: MEDICARE

## 2024-03-14 DIAGNOSIS — N40.0 BENIGN PROSTATIC HYPERPLASIA, UNSPECIFIED WHETHER LOWER URINARY TRACT SYMPTOMS PRESENT: ICD-10-CM

## 2024-03-14 DIAGNOSIS — I48.0 PAROXYSMAL ATRIAL FIBRILLATION: ICD-10-CM

## 2024-03-14 DIAGNOSIS — E78.5 DYSLIPIDEMIA, GOAL LDL BELOW 100: ICD-10-CM

## 2024-03-14 RX ORDER — METOPROLOL SUCCINATE 50 MG/1
50 TABLET, EXTENDED RELEASE ORAL 2 TIMES DAILY
Qty: 180 TABLET | Refills: 3 | Status: SHIPPED | OUTPATIENT
Start: 2024-03-14

## 2024-03-14 RX ORDER — SIMVASTATIN 20 MG/1
20 TABLET, FILM COATED ORAL NIGHTLY
Qty: 90 TABLET | Refills: 3 | Status: SHIPPED | OUTPATIENT
Start: 2024-03-14 | End: 2024-04-03 | Stop reason: SDUPTHER

## 2024-03-14 RX ORDER — TAMSULOSIN HYDROCHLORIDE 0.4 MG/1
1 CAPSULE ORAL NIGHTLY
Qty: 90 CAPSULE | Refills: 3 | Status: SHIPPED | OUTPATIENT
Start: 2024-03-14

## 2024-03-14 RX ORDER — VERAPAMIL HYDROCHLORIDE 120 MG/1
120 CAPSULE, EXTENDED RELEASE ORAL DAILY
Qty: 90 CAPSULE | Refills: 3 | Status: SHIPPED | OUTPATIENT
Start: 2024-03-14 | End: 2024-06-07 | Stop reason: SDUPTHER

## 2024-03-14 RX ORDER — GABAPENTIN 300 MG/1
300 CAPSULE ORAL 3 TIMES DAILY PRN
Qty: 90 CAPSULE | Refills: 3 | Status: SHIPPED | OUTPATIENT
Start: 2024-03-14 | End: 2025-03-14

## 2024-03-14 NOTE — TELEPHONE ENCOUNTER
Care Due:                  Date            Visit Type   Department     Provider  --------------------------------------------------------------------------------                                             [facility]   INTERNAL  Last Visit: 02-      PATIENT      MEDICINE       Maria Isabel Corral  Next Visit: None Scheduled  None         None Found                                                            Last  Test          Frequency    Reason                     Performed    Due Date  --------------------------------------------------------------------------------    CMP.........  6 months...  hydrocortisone...........  07- 01-    [facility] Embedded Care Due Messages. Reference number: [id number].   3/14/2024 10:29:20 AM CDT

## 2024-04-03 DIAGNOSIS — E78.5 DYSLIPIDEMIA, GOAL LDL BELOW 100: Primary | ICD-10-CM

## 2024-04-03 RX ORDER — SIMVASTATIN 20 MG/1
20 TABLET, FILM COATED ORAL NIGHTLY
Qty: 90 TABLET | Refills: 3 | Status: SHIPPED | OUTPATIENT
Start: 2024-04-03 | End: 2024-04-16 | Stop reason: SDUPTHER

## 2024-04-04 RX ORDER — ALPRAZOLAM 0.25 MG/1
TABLET ORAL
Qty: 30 TABLET | Refills: 2 | Status: SHIPPED | OUTPATIENT
Start: 2024-04-04

## 2024-04-16 ENCOUNTER — TELEPHONE (OUTPATIENT)
Dept: INTERNAL MEDICINE | Facility: CLINIC | Age: 84
End: 2024-04-16
Payer: MEDICARE

## 2024-04-16 DIAGNOSIS — E78.5 DYSLIPIDEMIA, GOAL LDL BELOW 100: ICD-10-CM

## 2024-04-16 RX ORDER — SIMVASTATIN 20 MG/1
20 TABLET, FILM COATED ORAL NIGHTLY
Qty: 90 TABLET | Refills: 3 | Status: SHIPPED | OUTPATIENT
Start: 2024-04-16 | End: 2024-04-19 | Stop reason: SDUPTHER

## 2024-04-19 ENCOUNTER — TELEPHONE (OUTPATIENT)
Dept: INTERNAL MEDICINE | Facility: CLINIC | Age: 84
End: 2024-04-19
Payer: MEDICARE

## 2024-04-19 DIAGNOSIS — E78.5 DYSLIPIDEMIA, GOAL LDL BELOW 100: ICD-10-CM

## 2024-04-19 RX ORDER — SIMVASTATIN 20 MG/1
20 TABLET, FILM COATED ORAL NIGHTLY
Qty: 90 TABLET | Refills: 3 | Status: SHIPPED | OUTPATIENT
Start: 2024-04-19 | End: 2024-05-03 | Stop reason: SDUPTHER

## 2024-05-03 DIAGNOSIS — E78.5 DYSLIPIDEMIA, GOAL LDL BELOW 100: ICD-10-CM

## 2024-05-03 RX ORDER — SIMVASTATIN 20 MG/1
20 TABLET, FILM COATED ORAL NIGHTLY
Qty: 90 TABLET | Refills: 3 | Status: SHIPPED | OUTPATIENT
Start: 2024-05-03 | End: 2024-05-03 | Stop reason: SDUPTHER

## 2024-05-03 RX ORDER — SIMVASTATIN 20 MG/1
20 TABLET, FILM COATED ORAL NIGHTLY
Qty: 90 TABLET | Refills: 3 | Status: SHIPPED | OUTPATIENT
Start: 2024-05-03 | End: 2024-06-03 | Stop reason: SDUPTHER

## 2024-05-03 NOTE — TELEPHONE ENCOUNTER
No care due was identified.  [facility] Embedded Care Due Messages. Reference number: [id number].   5/03/2024 12:29:13 PM CDT

## 2024-05-07 ENCOUNTER — TELEPHONE (OUTPATIENT)
Dept: INTERNAL MEDICINE | Facility: CLINIC | Age: 84
End: 2024-05-07
Payer: MEDICARE

## 2024-05-07 NOTE — TELEPHONE ENCOUNTER
Pt states that [facility] is not filling his simvastatin   This has been going on with us filling multiple times this past month   Can someone call [facility] and see what the issue is ?  I think it is either on back order or it needs a PA as simvastatin is inferior to rosuvastatin and atorvastin in cardiovascular protection     Let him and I know

## 2024-06-03 ENCOUNTER — TELEPHONE (OUTPATIENT)
Dept: INTERNAL MEDICINE | Facility: CLINIC | Age: 84
End: 2024-06-03
Payer: MEDICARE

## 2024-06-03 DIAGNOSIS — E78.5 DYSLIPIDEMIA, GOAL LDL BELOW 100: ICD-10-CM

## 2024-06-03 RX ORDER — SIMVASTATIN 20 MG/1
20 TABLET, FILM COATED ORAL NIGHTLY
Qty: 90 TABLET | Refills: 3 | Status: SHIPPED | OUTPATIENT
Start: 2024-06-03 | End: 2025-06-03

## 2024-06-07 DIAGNOSIS — I48.0 PAROXYSMAL ATRIAL FIBRILLATION: Primary | ICD-10-CM

## 2024-06-07 RX ORDER — VERAPAMIL HYDROCHLORIDE 120 MG/1
120 CAPSULE, EXTENDED RELEASE ORAL DAILY
Qty: 90 CAPSULE | Refills: 3 | Status: SHIPPED | OUTPATIENT
Start: 2024-06-07 | End: 2025-06-07

## 2024-07-29 RX ORDER — ALPRAZOLAM 0.25 MG/1
TABLET ORAL
Qty: 30 TABLET | Refills: 2 | Status: SHIPPED | OUTPATIENT
Start: 2024-07-29

## 2024-07-31 ENCOUNTER — TELEPHONE (OUTPATIENT)
Dept: INTERNAL MEDICINE | Facility: CLINIC | Age: 84
End: 2024-07-31
Payer: MEDICARE

## 2024-08-05 DIAGNOSIS — R06.00 DYSPNEA, UNSPECIFIED TYPE: Primary | ICD-10-CM

## 2024-08-30 ENCOUNTER — CLINICAL SUPPORT (OUTPATIENT)
Dept: INTERNAL MEDICINE | Facility: CLINIC | Age: 84
End: 2024-08-30
Payer: MEDICARE

## 2024-08-30 ENCOUNTER — OFFICE VISIT (OUTPATIENT)
Dept: INTERNAL MEDICINE | Facility: CLINIC | Age: 84
End: 2024-08-30
Payer: MEDICARE

## 2024-08-30 ENCOUNTER — HOSPITAL ENCOUNTER (OUTPATIENT)
Dept: CARDIOLOGY | Facility: CLINIC | Age: 84
Discharge: HOME | End: 2024-08-30
Payer: MEDICARE

## 2024-08-30 DIAGNOSIS — R94.30 LOW LEFT VENTRICULAR EJECTION FRACTION: ICD-10-CM

## 2024-08-30 DIAGNOSIS — I34.0 MODERATE MITRAL REGURGITATION: ICD-10-CM

## 2024-08-30 DIAGNOSIS — Z12.5 ENCOUNTER FOR SCREENING FOR MALIGNANT NEOPLASM OF PROSTATE: ICD-10-CM

## 2024-08-30 DIAGNOSIS — Z00.00 ANNUAL PHYSICAL EXAM: Primary | ICD-10-CM

## 2024-08-30 DIAGNOSIS — E78.5 HYPERLIPIDEMIA LDL GOAL <70: ICD-10-CM

## 2024-08-30 DIAGNOSIS — R79.9 ABNORMAL FINDING OF BLOOD CHEMISTRY, UNSPECIFIED: ICD-10-CM

## 2024-08-30 DIAGNOSIS — Z85.46 HISTORY OF PROSTATE CANCER: ICD-10-CM

## 2024-08-30 DIAGNOSIS — R53.81 OTHER MALAISE: ICD-10-CM

## 2024-08-30 DIAGNOSIS — I48.19 PERSISTENT ATRIAL FIBRILLATION: ICD-10-CM

## 2024-08-30 DIAGNOSIS — R06.00 DYSPNEA, UNSPECIFIED TYPE: ICD-10-CM

## 2024-08-30 DIAGNOSIS — I10 PRIMARY HYPERTENSION: ICD-10-CM

## 2024-08-30 LAB
ALBUMIN SERPL BCP-MCNC: 3.8 G/DL (ref 3.5–5.2)
ALP SERPL-CCNC: 96 U/L (ref 55–135)
ALT SERPL W/O P-5'-P-CCNC: 13 U/L (ref 10–44)
ANION GAP SERPL CALC-SCNC: 12 MMOL/L (ref 8–16)
AST SERPL-CCNC: 15 U/L (ref 10–40)
BILIRUB SERPL-MCNC: 0.9 MG/DL (ref 0.1–1)
BUN SERPL-MCNC: 14 MG/DL (ref 8–23)
CALCIUM SERPL-MCNC: 10.3 MG/DL (ref 8.7–10.5)
CHLORIDE SERPL-SCNC: 104 MMOL/L (ref 95–110)
CHOLEST SERPL-MCNC: 113 MG/DL (ref 120–199)
CHOLEST/HDLC SERPL: 2.5 {RATIO} (ref 2–5)
CO2 SERPL-SCNC: 25 MMOL/L (ref 23–29)
COMPLEXED PSA SERPL-MCNC: <0.01 NG/ML (ref 0–4)
CREAT SERPL-MCNC: 1 MG/DL (ref 0.5–1.4)
ERYTHROCYTE [DISTWIDTH] IN BLOOD BY AUTOMATED COUNT: 12.4 % (ref 11.5–14.5)
EST. GFR  (NO RACE VARIABLE): >60 ML/MIN/1.73 M^2
ESTIMATED AVG GLUCOSE: 105 MG/DL (ref 68–131)
GLUCOSE SERPL-MCNC: 103 MG/DL (ref 70–110)
HBA1C MFR BLD: 5.3 % (ref 4–5.6)
HCT VFR BLD AUTO: 46.4 % (ref 40–54)
HDLC SERPL-MCNC: 45 MG/DL (ref 40–75)
HDLC SERPL: 39.8 % (ref 20–50)
HGB BLD-MCNC: 15.1 G/DL (ref 14–18)
LDLC SERPL CALC-MCNC: 56.6 MG/DL (ref 63–159)
MCH RBC QN AUTO: 33.9 PG (ref 27–31)
MCHC RBC AUTO-ENTMCNC: 32.5 G/DL (ref 32–36)
MCV RBC AUTO: 104 FL (ref 82–98)
NONHDLC SERPL-MCNC: 68 MG/DL
OHS QRS DURATION: 90 MS
OHS QTC CALCULATION: 486 MS
PLATELET # BLD AUTO: 211 K/UL (ref 150–450)
PMV BLD AUTO: 11 FL (ref 9.2–12.9)
POTASSIUM SERPL-SCNC: 4.5 MMOL/L (ref 3.5–5.1)
PROT SERPL-MCNC: 7.1 G/DL (ref 6–8.4)
RBC # BLD AUTO: 4.45 M/UL (ref 4.6–6.2)
SODIUM SERPL-SCNC: 141 MMOL/L (ref 136–145)
TRIGL SERPL-MCNC: 57 MG/DL (ref 30–150)
TSH SERPL DL<=0.005 MIU/L-ACNC: 1.57 UIU/ML (ref 0.4–4)
WBC # BLD AUTO: 6.29 K/UL (ref 3.9–12.7)

## 2024-08-30 PROCEDURE — 93005 ELECTROCARDIOGRAM TRACING: CPT | Mod: PBBFAC | Performed by: INTERNAL MEDICINE

## 2024-08-30 PROCEDURE — 99999PBSHW PR PBB SHADOW TECHNICAL ONLY FILED TO HB: Mod: PBBFAC,,,

## 2024-08-30 PROCEDURE — 85027 COMPLETE CBC AUTOMATED: CPT | Performed by: INTERNAL MEDICINE

## 2024-08-30 PROCEDURE — 99999 PR PBB SHADOW E&M-EST. PATIENT-LVL III: CPT | Mod: PBBFAC,,, | Performed by: INTERNAL MEDICINE

## 2024-08-30 PROCEDURE — 80053 COMPREHEN METABOLIC PANEL: CPT | Performed by: INTERNAL MEDICINE

## 2024-08-30 PROCEDURE — 83036 HEMOGLOBIN GLYCOSYLATED A1C: CPT | Performed by: INTERNAL MEDICINE

## 2024-08-30 PROCEDURE — 90677 PCV20 VACCINE IM: CPT | Mod: PBBFAC

## 2024-08-30 PROCEDURE — 93010 ELECTROCARDIOGRAM REPORT: CPT | Mod: S$PBB,,, | Performed by: INTERNAL MEDICINE

## 2024-08-30 PROCEDURE — 99397 PER PM REEVAL EST PAT 65+ YR: CPT | Mod: S$PBB,GZ,, | Performed by: INTERNAL MEDICINE

## 2024-08-30 PROCEDURE — G0009 ADMIN PNEUMOCOCCAL VACCINE: HCPCS | Mod: PBBFAC

## 2024-08-30 PROCEDURE — 84443 ASSAY THYROID STIM HORMONE: CPT | Performed by: INTERNAL MEDICINE

## 2024-08-30 PROCEDURE — 99199 UNLISTED SPECIAL SVC PX/RPRT: CPT | Mod: S$PBB,,, | Performed by: INTERNAL MEDICINE

## 2024-08-30 PROCEDURE — 99213 OFFICE O/P EST LOW 20 MIN: CPT | Mod: PBBFAC,25 | Performed by: INTERNAL MEDICINE

## 2024-08-30 PROCEDURE — 84153 ASSAY OF PSA TOTAL: CPT | Performed by: INTERNAL MEDICINE

## 2024-08-30 PROCEDURE — 80061 LIPID PANEL: CPT | Performed by: INTERNAL MEDICINE

## 2024-08-30 RX ORDER — IBUPROFEN 200 MG
2 CAPSULE ORAL DAILY
COMMUNITY

## 2024-08-30 RX ADMIN — PNEUMOCOCCAL 20-VALENT CONJUGATE VACCINE 0.5 ML
2.2; 2.2; 2.2; 2.2; 2.2; 2.2; 2.2; 2.2; 2.2; 2.2; 2.2; 2.2; 2.2; 2.2; 2.2; 2.2; 4.4; 2.2; 2.2; 2.2 INJECTION, SUSPENSION INTRAMUSCULAR at 10:08

## 2024-09-02 ENCOUNTER — TELEPHONE (OUTPATIENT)
Dept: INTERNAL MEDICINE | Facility: CLINIC | Age: 84
End: 2024-09-02
Payer: MEDICARE

## 2024-09-02 VITALS
OXYGEN SATURATION: 96 % | SYSTOLIC BLOOD PRESSURE: 130 MMHG | WEIGHT: 183 LBS | DIASTOLIC BLOOD PRESSURE: 62 MMHG | BODY MASS INDEX: 24.79 KG/M2 | HEIGHT: 72 IN | HEART RATE: 93 BPM

## 2024-09-02 NOTE — TELEPHONE ENCOUNTER
Patient did not schedule his echocardiogram and follow-up with Dr. Barriga as he would like to have a schedule in front of him.  He would also like to have appointments between 8 and 9:00 a.m..  There is no urgency on this appointment time frame.  I would think Dr. Barriga would like a day cushion time or 2 to have the echocardiogram read by 1 of his colleagues.    He also needs an eye exam I placed a referral.    If you can organize any of the 2 of 3 appointments same day that would be great between 8 and 9:00 a.m..  We saw the call the patient to confirm

## 2024-09-02 NOTE — PROGRESS NOTES
Subjective:       Patient ID: Oliver Burk Jr. is a 84 y.o. male who presents today for:    Chief Complaint:   Chief Complaint   Patient presents with    Annual Exam     A fib concerns. Brought his meds       HPI:  Very pleasant 84-year-old gentleman with a past medical history of likely now persistent atrial fibrillation, hypertension, dyslipidemia as well as osteoarthritis is here for an executive health physical as well as a  annual physical exam.  He also has a history of mild BPH and mild ROMEO.  He does complain of sleep issues but could not tolerate the mask and so takes an occasional very low dose of alprazolam to help him rest.  He has no symptoms of atrial fibrillation as far as tachycardia, dyspnea on exertion; although as heart rate can run at rest anywhere from .  He was cardioverted in October of 2022 but quickly went back into atrial fibrillation.  When looking over his last echocardiogram of his heart ejection fraction of 35%.  There is no history of prior myocardial infarction.  This was done in September of 2023.  He also had moderate tricuspid regurgitation as well as moderate mitral regurgitation and only mild to moderate dilated atrium.  The patient has a electively lost 7 lb in the past 6 months.  He is exercising regularly most mornings.    Today he had an EKG that showed atrial fibrillation with a heart rate of 89 beats per minute.    Review of Systems   Constitutional:  Negative for chills, fever and weight loss.   HENT:  Negative for sore throat.    Eyes:  Negative for blurred vision and double vision.   Respiratory:  Negative for cough and shortness of breath.    Cardiovascular:  Negative for chest pain and palpitations.   Gastrointestinal:  Negative for constipation, diarrhea, nausea and vomiting.   Genitourinary:  Negative for dysuria and hematuria.   Musculoskeletal:  Negative for joint pain and myalgias.   Skin:  Negative for itching and rash.   Neurological:   Negative for sensory change, focal weakness and headaches.        Occasional nerve pain in the lateral left knee?   Endo/Heme/Allergies:  Does not bruise/bleed easily.   Psychiatric/Behavioral:  Negative for depression and suicidal ideas.         Medications:  Outpatient Encounter Medications as of 8/30/2024   Medication Sig Note Dispense Refill    ALPRAZolam (XANAX) 0.25 MG tablet Take one tablet hs for sleep  30 tablet 2    ascorbic acid, vitamin C, (VITAMIN C) 250 MG tablet Take 250 mg by mouth once daily.       azelastine (ASTELIN) 137 mcg (0.1 %) nasal spray 1 spray (137 mcg total) by Nasal route 2 (two) times daily.  30 mL 2    calcium citrate (CALCITRATE) 200 mg (950 mg) tablet Take 2 tablets by mouth once daily.       diclofenac sodium (VOLTAREN) 1 % Gel Apply 1 Tube topically 2 (two) times daily as needed. 2/23/2023: Continue to hold until after Surgery.         gabapentin (NEURONTIN) 300 MG capsule Take 1 capsule (300 mg total) by mouth 3 (three) times daily as needed (radiating nerve pain from the back).  90 capsule 3    Lactobac no.41/Bifidobact no.7 (PROBIOTIC-10 ORAL) Take by mouth.       metoprolol succinate (TOPROL-XL) 50 MG 24 hr tablet Take 1 tablet (50 mg total) by mouth 2 (two) times daily.  180 tablet 3    rivaroxaban (XARELTO) 20 mg Tab Take 1 tablet (20 mg total) by mouth daily with dinner or evening meal.  90 tablet 3    simvastatin (ZOCOR) 20 MG tablet Take 1 tablet (20 mg total) by mouth every evening.  90 tablet 3    tamsulosin (FLOMAX) 0.4 mg Cap Take 1 capsule (0.4 mg total) by mouth every evening.  90 capsule 3    verapamiL (VERELAN) 120 MG C24P Take 1 capsule (120 mg total) by mouth once daily.  90 capsule 3    vitamin D (VITAMIN D3) 1000 units Tab Take 1,000 Units by mouth once daily.       [DISCONTINUED] co-enzyme Q-10 30 mg capsule Take 30 mg by mouth 3 (three) times daily.       [DISCONTINUED] eszopiclone (LUNESTA) 1 MG Tab Take 1 tablet (1 mg total) by mouth nightly as needed.  30  tablet 2    [DISCONTINUED] hydrocortisone (ANUSOL-HC) 2.5 % rectal cream Place rectally 2 (two) times daily.  28 g 1    [DISCONTINUED] zolpidem (AMBIEN) 5 MG Tab Take 1 tablet (5 mg total) by mouth nightly as needed (Insomnia).  30 tablet 2    [] pneumoc 20-hua conj-dip cr(PF) (PREVNAR-20 (PF)) injection Syrg 0.5 mL         No facility-administered encounter medications on file as of 2024.       Allergies:  Review of patient's allergies indicates:  No Known Allergies    Health Maintenance:  Immunization History   Administered Date(s) Administered    COVID-19, MRNA, LN-S, PF (Pfizer) (Gray Cap) 2022    COVID-19, MRNA, LN-S, PF (Pfizer) (Purple Cap) 2021, 2021, 08/15/2021    Hepatitis A, Adult 2008    IPV 2008    Influenza (FLUAD) - Quadrivalent - Adjuvanted - PF *Preferred* (65+) 2021, 2022, 2023    Influenza - High Dose - PF (65 years and older) 10/12/2016    Influenza - Quadrivalent - High Dose - PF (65 years and older) 11/15/2020    Pneumococcal Conjugate - 13 Valent 2023    Pneumococcal Conjugate - 20 Valent 2024    Typhoid - ViCPs 2008      Health Maintenance   Topic Date Due    Shingles Vaccine (1 of 2) Never done    TETANUS VACCINE  2018    Lipid Panel  2029          Objective:      Vital Signs  Pulse: 93  SpO2: 96 %  BP: 130/62  Pain Score: 0-No pain  Height and Weight  Height: 6' (182.9 cm)  Weight: 83 kg (183 lb)  BSA (Calculated - sq m): 2.05 sq meters  BMI (Calculated): 24.8  Weight in (lb) to have BMI = 25: 183.9]    Physical Exam  Constitutional:       General: He is not in acute distress.     Appearance: Normal appearance.   HENT:      Head: Normocephalic and atraumatic.   Eyes:      Conjunctiva/sclera: Conjunctivae normal.   Cardiovascular:      Rate and Rhythm: Normal rate. Rhythm irregularly irregular.      Heart sounds: S1 normal and S2 normal. Murmur heard.      Systolic murmur is present with a grade of 1/6.    Pulmonary:      Effort: Pulmonary effort is normal.      Breath sounds: Normal breath sounds.   Abdominal:      Palpations: Abdomen is soft.   Musculoskeletal:         General: Normal range of motion.      Cervical back: Normal range of motion and neck supple.   Skin:     General: Skin is warm and dry.   Neurological:      General: No focal deficit present.      Mental Status: He is alert.   Psychiatric:         Mood and Affect: Mood normal.         Behavior: Behavior normal.         Thought Content: Thought content normal.         Judgment: Judgment normal.        Lab Results   Component Value Date    WBC 6.29 08/30/2024    HGB 15.1 08/30/2024    HCT 46.4 08/30/2024     08/30/2024    CHOL 113 (L) 08/30/2024    TRIG 57 08/30/2024    HDL 45 08/30/2024    ALT 13 08/30/2024    AST 15 08/30/2024     08/30/2024    K 4.5 08/30/2024     08/30/2024    CREATININE 1.0 08/30/2024    BUN 14 08/30/2024    CO2 25 08/30/2024    TSH 1.567 08/30/2024    PSA <0.01 08/30/2024    INR 1.2 10/17/2022    HGBA1C 5.3 08/30/2024       Assessment/plan:     Oliver Burk Isaías is a 84 y.o.male here for an annual physical exam with the following past medical history.    Persistent atrial fibrillation   Rate controlled on metoprolol and verapamil and on anticoagulation with Xarelto  -     Echo; Future    Low left ventricular ejection fraction- ? 35 percent in 9/2023  -     Echo; Future-needs follow-up with his electrophysiology cardiologist.    Moderate mitral regurgitation-as per above   Blood pressure well-controlled  -     Echo; Future    Primary hypertension   Continue with verapamil 120 mg extended release as well as metoprolol 50 mg twice daily.  -     Ambulatory referral/consult to Optometry; Future; Expected date: 09/06/2024    History of prostate cancer-  diagnosed 2010 low [place] score; status post radiation seeds  -     PSA undetectable today    Hyperlipidemia LDL goal <70   Continue with simvastatin 20 mg  daily.  -     Ambulatory referral/consult to Optometry; Future; Expected date: 09/06/2024        No future appointments.    Maria Isabel Osborne MD  [facility]

## 2024-09-03 ENCOUNTER — PATIENT MESSAGE (OUTPATIENT)
Dept: OPTOMETRY | Facility: CLINIC | Age: 84
End: 2024-09-03
Payer: MEDICARE

## 2024-09-06 ENCOUNTER — HOSPITAL ENCOUNTER (OUTPATIENT)
Dept: CARDIOLOGY | Facility: HOSPITAL | Age: 84
Discharge: HOME | End: 2024-09-06
Attending: INTERNAL MEDICINE
Payer: MEDICARE

## 2024-09-06 VITALS
HEART RATE: 90 BPM | WEIGHT: 183 LBS | BODY MASS INDEX: 24.79 KG/M2 | DIASTOLIC BLOOD PRESSURE: 62 MMHG | HEIGHT: 72 IN | SYSTOLIC BLOOD PRESSURE: 130 MMHG

## 2024-09-06 DIAGNOSIS — R94.30 LOW LEFT VENTRICULAR EJECTION FRACTION: ICD-10-CM

## 2024-09-06 DIAGNOSIS — I48.19 PERSISTENT ATRIAL FIBRILLATION: ICD-10-CM

## 2024-09-06 DIAGNOSIS — I34.0 MODERATE MITRAL REGURGITATION: ICD-10-CM

## 2024-09-06 LAB
AORTIC ROOT ANNULUS: 1.23 CM
ASCENDING AORTA: 3.08 CM
AV INDEX (PROSTH): 0.61
AV MEAN GRADIENT: 9 MMHG
AV PEAK GRADIENT: 14 MMHG
AV VALVE AREA BY VELOCITY RATIO: 2.37 CM²
AV VALVE AREA: 2.57 CM²
AV VELOCITY RATIO: 0.56
BSA FOR ECHO PROCEDURE: 2.05 M2
CV ECHO LV RWT: 0.58 CM
DOP CALC AO PEAK VEL: 1.87 M/S
DOP CALC AO VTI: 36.41 CM
DOP CALC LVOT AREA: 4.2 CM2
DOP CALC LVOT DIAMETER: 2.32 CM
DOP CALC LVOT PEAK VEL: 1.05 M/S
DOP CALC LVOT STROKE VOLUME: 93.55 CM3
DOP CALCLVOT PEAK VEL VTI: 22.14 CM
E/E' RATIO: 12.24 M/S
ECHO LV POSTERIOR WALL: 1.22 CM (ref 0.6–1.1)
FRACTIONAL SHORTENING: 25 % (ref 28–44)
INTERVENTRICULAR SEPTUM: 0.87 CM (ref 0.6–1.1)
IVRT: 68.51 MSEC
LA MAJOR: 7.26 CM
LA MINOR: 6.51 CM
LA WIDTH: 5.29 CM
LEFT ATRIUM SIZE: 4.99 CM
LEFT ATRIUM VOLUME INDEX MOD: 52.9 ML/M2
LEFT ATRIUM VOLUME INDEX: 75.5 ML/M2
LEFT ATRIUM VOLUME MOD: 107.94 CM3
LEFT ATRIUM VOLUME: 154.02 CM3
LEFT INTERNAL DIMENSION IN SYSTOLE: 3.16 CM (ref 2.1–4)
LEFT VENTRICLE DIASTOLIC VOLUME INDEX: 39.14 ML/M2
LEFT VENTRICLE DIASTOLIC VOLUME: 79.84 ML
LEFT VENTRICLE MASS INDEX: 72 G/M2
LEFT VENTRICLE SYSTOLIC VOLUME INDEX: 19.5 ML/M2
LEFT VENTRICLE SYSTOLIC VOLUME: 39.77 ML
LEFT VENTRICULAR INTERNAL DIMENSION IN DIASTOLE: 4.23 CM (ref 3.5–6)
LEFT VENTRICULAR MASS: 147.66 G
LV LATERAL E/E' RATIO: 9.45 M/S
LV SEPTAL E/E' RATIO: 17.33 M/S
MV A" WAVE DURATION": 15.7 MSEC
MV PEAK E VEL: 1.04 M/S
PISA MRMAX VEL: 0.05 M/S
PISA TR MAX VEL: 3.15 M/S
PULM VEIN S/D RATIO: 0.32
PV PEAK D VEL: 1.02 M/S
PV PEAK S VEL: 0.33 M/S
RA MAJOR: 6.61 CM
RA PRESSURE ESTIMATED: 15 MMHG
RA WIDTH: 4.78 CM
RIGHT VENTRICLE DIASTOLIC BASEL DIMENSION: 4.3 CM
RV TB RVSP: 18 MMHG
SINUS: 3.16 CM
STJ: 2.95 CM
TDI LATERAL: 0.11 M/S
TDI SEPTAL: 0.06 M/S
TDI: 0.09 M/S
TR MAX PG: 40 MMHG
TRICUSPID ANNULAR PLANE SYSTOLIC EXCURSION: 1.47 CM
TV REST PULMONARY ARTERY PRESSURE: 55 MMHG
Z-SCORE OF LEFT VENTRICULAR DIMENSION IN END DIASTOLE: -3.63
Z-SCORE OF LEFT VENTRICULAR DIMENSION IN END SYSTOLE: -1.3

## 2024-09-06 PROCEDURE — 93306 TTE W/DOPPLER COMPLETE: CPT | Mod: 26,,, | Performed by: INTERNAL MEDICINE

## 2024-09-06 PROCEDURE — 93306 TTE W/DOPPLER COMPLETE: CPT

## 2024-09-15 ENCOUNTER — TELEPHONE (OUTPATIENT)
Dept: INTERNAL MEDICINE | Facility: CLINIC | Age: 84
End: 2024-09-15
Payer: MEDICARE

## 2024-09-15 RX ORDER — ALPRAZOLAM 0.25 MG/1
0.25 TABLET ORAL 2 TIMES DAILY PRN
Qty: 45 TABLET | Refills: 2 | Status: SHIPPED | OUTPATIENT
Start: 2024-09-15

## 2024-09-25 ENCOUNTER — OFFICE VISIT (OUTPATIENT)
Dept: ELECTROPHYSIOLOGY | Facility: CLINIC | Age: 84
End: 2024-09-25
Payer: MEDICARE

## 2024-09-25 VITALS
HEART RATE: 105 BPM | WEIGHT: 183 LBS | DIASTOLIC BLOOD PRESSURE: 71 MMHG | SYSTOLIC BLOOD PRESSURE: 139 MMHG | HEIGHT: 71 IN | BODY MASS INDEX: 25.62 KG/M2

## 2024-09-25 DIAGNOSIS — I10 PRIMARY HYPERTENSION: ICD-10-CM

## 2024-09-25 DIAGNOSIS — I48.91 ATRIAL FIBRILLATION BY ELECTROCARDIOGRAM: ICD-10-CM

## 2024-09-25 DIAGNOSIS — I48.19 PERSISTENT ATRIAL FIBRILLATION: ICD-10-CM

## 2024-09-25 DIAGNOSIS — G47.33 OSA (OBSTRUCTIVE SLEEP APNEA): ICD-10-CM

## 2024-09-25 DIAGNOSIS — I48.0 PAROXYSMAL ATRIAL FIBRILLATION: ICD-10-CM

## 2024-09-25 DIAGNOSIS — I70.0 AORTIC ATHEROSCLEROSIS: Primary | ICD-10-CM

## 2024-09-25 LAB
OHS QRS DURATION: 88 MS
OHS QTC CALCULATION: 441 MS

## 2024-09-25 PROCEDURE — 99213 OFFICE O/P EST LOW 20 MIN: CPT | Mod: PBBFAC,25 | Performed by: INTERNAL MEDICINE

## 2024-09-25 PROCEDURE — 93010 ELECTROCARDIOGRAM REPORT: CPT | Mod: S$PBB,,, | Performed by: INTERNAL MEDICINE

## 2024-09-25 PROCEDURE — 99215 OFFICE O/P EST HI 40 MIN: CPT | Mod: S$PBB,,, | Performed by: INTERNAL MEDICINE

## 2024-09-25 PROCEDURE — 99999 PR PBB SHADOW E&M-EST. PATIENT-LVL III: CPT | Mod: PBBFAC,,, | Performed by: INTERNAL MEDICINE

## 2024-09-25 PROCEDURE — 93005 ELECTROCARDIOGRAM TRACING: CPT | Mod: PBBFAC | Performed by: INTERNAL MEDICINE

## 2024-09-25 RX ORDER — ESZOPICLONE 1 MG/1
1 TABLET, FILM COATED ORAL NIGHTLY
COMMUNITY

## 2024-09-25 RX ORDER — VERAPAMIL HYDROCHLORIDE 120 MG/1
120 CAPSULE, EXTENDED RELEASE ORAL 2 TIMES DAILY
Qty: 180 CAPSULE | Refills: 3 | Status: SHIPPED | OUTPATIENT
Start: 2024-09-25 | End: 2025-09-25

## 2024-09-25 NOTE — PROGRESS NOTES
Subjective:    Patient ID:  Oliver Burk Jr. is a 84 y.o. male who presents for evaluation of AF    HPI  84 y.o. M  referred by his friend Dr QUINTIN Benton Ashley  HTN on meds  HL on meds  BPH  spinal stenosis c/b bilat foot drop  permanent AF    discovered asymptomatic AF with RVR. Works out without a problem.  7/2020 ECG: SR  TSH wnl    We did RAINER/DCCV 10/7/22. He noticed no difference between AF and NSR. Per [employer] monitor, NSR lasted about 2 days.   He's still working out 5-6x/week without issues.     Update 9/2023:  Feeling well. Here for f/u.  Echo 9/23 read as LVEF 35%. Because this was a significant decrease from prior, I reviewed it with Elliot Stallworth and Ricky. LVEF is actually 55%. There's moderate MR.  Holter: AF averaging 103 bpm.    9/2024: Doing well overall. Working out still.   Feels worse after taking metoprolol, however.  E: 55-60% LVEF    My interpretation of today's ECG is AF with     Review of Systems   Constitutional: Positive for malaise/fatigue.   HENT: Negative.  Negative for ear pain and tinnitus.    Eyes:  Negative for blurred vision.   Cardiovascular: Negative.  Negative for chest pain, dyspnea on exertion, near-syncope, palpitations and syncope.   Respiratory: Negative.  Negative for shortness of breath.    Endocrine: Negative.  Negative for polyuria.   Hematologic/Lymphatic: Does not bruise/bleed easily.   Skin: Negative.  Negative for rash.   Musculoskeletal: Negative.  Negative for joint pain and muscle weakness.   Gastrointestinal: Negative.  Negative for abdominal pain and change in bowel habit.   Genitourinary:  Negative for frequency.   Neurological: Negative.  Negative for dizziness and weakness. Focal weakness: bilateral foot drop.  Psychiatric/Behavioral: Negative.  Negative for depression. The patient is not nervous/anxious.    Allergic/Immunologic: Negative for environmental allergies.        Objective:    Physical Exam  Vitals and nursing note reviewed.    Constitutional:       Appearance: He is well-developed.   HENT:      Head: Normocephalic and atraumatic.   Eyes:      General: Lids are normal. No scleral icterus.     Conjunctiva/sclera: Conjunctivae normal.   Neck:      Thyroid: No thyromegaly.      Vascular: No JVD.      Trachea: No tracheal deviation.   Cardiovascular:      Rate and Rhythm: Tachycardia present. Rhythm irregular.      Pulses:           Radial pulses are 2+ on the right side and 2+ on the left side.   Pulmonary:      Effort: Pulmonary effort is normal. No tachypnea, accessory muscle usage or respiratory distress.      Breath sounds: No wheezing.   Abdominal:      General: There is no distension.   Musculoskeletal:         General: Normal range of motion.      Cervical back: Normal range of motion.   Skin:     General: Skin is warm and dry.   Neurological:      Mental Status: He is alert and oriented to person, place, and time.      Motor: No abnormal muscle tone.      Deep Tendon Reflexes: Reflexes are normal and symmetric.   Psychiatric:         Behavior: Behavior normal.           Assessment:       1. Aortic atherosclerosis    2. Atrial fibrillation by electrocardiogram    3. Primary hypertension    4. Paroxysmal atrial fibrillation    5. Persistent atrial fibrillation    6. ROMEO (obstructive sleep apnea)         Plan:       No change in sx between NSR and AF.   Will continue rate-control strategy.    Change BB to verapamil for continued control of HR, BP... but maybe fewer ADEs.  Discussed the indications and possible side effects of the medications prescribed to the patient by me.    Return in 1 year, or earlier prn.

## 2024-09-27 ENCOUNTER — TELEPHONE (OUTPATIENT)
Dept: ELECTROPHYSIOLOGY | Facility: CLINIC | Age: 84
End: 2024-09-27
Payer: MEDICARE

## 2024-09-27 NOTE — TELEPHONE ENCOUNTER
----- Message from Harleen Randolph MA sent at 9/27/2024 11:21 AM CDT -----  Regarding: FW: Medication    ----- Message -----  From: Arpita Roman  Sent: 9/27/2024  11:16 AM CDT  To: Linus AREVALO Staff  Subject: Medication                                       Pt [phone] calling in ref to medication eith the doctor is suppose to prescribed or change he's not sure, but he would like a call from the office.    Thanks

## 2024-10-03 ENCOUNTER — TELEPHONE (OUTPATIENT)
Dept: PULMONOLOGY | Facility: CLINIC | Age: 84
End: 2024-10-03
Payer: MEDICARE

## 2024-10-03 ENCOUNTER — TELEPHONE (OUTPATIENT)
Dept: INTERNAL MEDICINE | Facility: CLINIC | Age: 84
End: 2024-10-03
Payer: MEDICARE

## 2024-10-03 RX ORDER — ALPRAZOLAM 0.25 MG/1
0.25 TABLET ORAL 2 TIMES DAILY PRN
Qty: 45 TABLET | Refills: 2 | Status: SHIPPED | OUTPATIENT
Start: 2024-10-03

## 2024-10-03 NOTE — TELEPHONE ENCOUNTER
----- Message from Gomez sent at 10/3/2024  8:13 AM CDT -----  Consult/Advisory    Name Of Caller:Oliver       Contact Preference:[phone]    Nature of call He said Dr Patterson asked him to call you to get his state lic number

## 2024-10-03 NOTE — TELEPHONE ENCOUNTER
Dr Patterson asked me to give Mr Burk his Doctor's license number and I called Mr Burk and did. Ashley Phillips

## 2024-10-17 RX ORDER — ALPRAZOLAM 0.25 MG/1
0.25 TABLET ORAL 2 TIMES DAILY PRN
Qty: 45 TABLET | Refills: 2 | Status: SHIPPED | OUTPATIENT
Start: 2024-10-17

## 2024-10-17 NOTE — TELEPHONE ENCOUNTER
No care due was identified.  [facility] Embedded Care Due Messages. Reference number: [id number].   10/17/2024 9:46:28 AM CDT

## 2024-10-21 DIAGNOSIS — G57.93 NEURALGIA OF BOTH LOWER EXTREMITIES: ICD-10-CM

## 2024-10-21 DIAGNOSIS — M48.062 NEUROGENIC CLAUDICATION DUE TO LUMBAR SPINAL STENOSIS: Primary | ICD-10-CM

## 2024-10-22 ENCOUNTER — TELEPHONE (OUTPATIENT)
Dept: NEUROLOGY | Facility: CLINIC | Age: 84
End: 2024-10-22
Payer: MEDICARE

## 2024-10-22 ENCOUNTER — TELEPHONE (OUTPATIENT)
Dept: ORTHOPEDICS | Facility: CLINIC | Age: 84
End: 2024-10-22
Payer: MEDICARE

## 2024-10-22 ENCOUNTER — OFFICE VISIT (OUTPATIENT)
Dept: NEUROLOGY | Facility: CLINIC | Age: 84
End: 2024-10-22
Payer: MEDICARE

## 2024-10-22 ENCOUNTER — LAB VISIT (OUTPATIENT)
Dept: LAB | Facility: OTHER | Age: 84
End: 2024-10-22
Attending: STUDENT IN AN ORGANIZED HEALTH CARE EDUCATION/TRAINING PROGRAM
Payer: MEDICARE

## 2024-10-22 VITALS
DIASTOLIC BLOOD PRESSURE: 82 MMHG | WEIGHT: 183 LBS | BODY MASS INDEX: 25.62 KG/M2 | HEIGHT: 71 IN | SYSTOLIC BLOOD PRESSURE: 133 MMHG | HEART RATE: 76 BPM

## 2024-10-22 DIAGNOSIS — M25.559 CHRONIC HIP PAIN, UNSPECIFIED LATERALITY: Primary | ICD-10-CM

## 2024-10-22 DIAGNOSIS — M25.562 CHRONIC PAIN OF LEFT KNEE: Primary | ICD-10-CM

## 2024-10-22 DIAGNOSIS — G89.29 CHRONIC HIP PAIN, UNSPECIFIED LATERALITY: Primary | ICD-10-CM

## 2024-10-22 DIAGNOSIS — R20.8 DECREASED SENSE OF VIBRATION: ICD-10-CM

## 2024-10-22 DIAGNOSIS — G89.29 CHRONIC PAIN OF LEFT KNEE: Primary | ICD-10-CM

## 2024-10-22 PROCEDURE — 36415 COLL VENOUS BLD VENIPUNCTURE: CPT | Performed by: STUDENT IN AN ORGANIZED HEALTH CARE EDUCATION/TRAINING PROGRAM

## 2024-10-22 PROCEDURE — 99213 OFFICE O/P EST LOW 20 MIN: CPT | Mod: PBBFAC | Performed by: STUDENT IN AN ORGANIZED HEALTH CARE EDUCATION/TRAINING PROGRAM

## 2024-10-22 PROCEDURE — 99204 OFFICE O/P NEW MOD 45 MIN: CPT | Mod: S$PBB,,, | Performed by: STUDENT IN AN ORGANIZED HEALTH CARE EDUCATION/TRAINING PROGRAM

## 2024-10-22 PROCEDURE — 99999 PR PBB SHADOW E&M-EST. PATIENT-LVL III: CPT | Mod: PBBFAC,,, | Performed by: STUDENT IN AN ORGANIZED HEALTH CARE EDUCATION/TRAINING PROGRAM

## 2024-10-22 PROCEDURE — 83921 ORGANIC ACID SINGLE QUANT: CPT | Performed by: STUDENT IN AN ORGANIZED HEALTH CARE EDUCATION/TRAINING PROGRAM

## 2024-10-22 NOTE — TELEPHONE ENCOUNTER
Called pt and stated I would r/s his appt to a sooner date. Told  him to look to his portal for that change. Pt verbalized understanding and has no further questions.    ----- Message from Lisa sent at 10/22/2024 11:11 AM CDT -----  Regarding: hip pain  Good morning,    Mr Burk would like to schedule an appt with Dr Camacho. First available for me to schedule is December, he would like to be seen sooner.  If possible, can you please assist with scheduling him. He can be reached [phone]    Dx Chronic hip pain    Thank you,   Lisa Corral, [facility]   Phone: [phone]   Email: [email or web address]

## 2024-10-22 NOTE — PROGRESS NOTES
Neurology Clinic Note      Date: 10/22/24  Patient Name: Oliver Burk Jr.   MRN: [id number]   PCP: Maria Isabel Corral  Referring Provider: Maria Isabel Corral MD    Assessment and Plan:   Oliver Burk Jr. is a 84 y.o. male presenting for evaluation of episodic pain/stiffness in the left knee.   On his gait assessment, his left knee seems to give away when he puts pressure on the left leg.  He does have decreased vibration sense at the ankles in both lower extremities although does not have an ataxic gait  Has a history of mild/moderate lumbar spinal stenosis along with a history of spinal decompression surgery.    Suspect an orthopedic cause.  Do not feel that his issues with the left knee are related to his lumbar spine.  Given the localized nature of his symptoms, do not suspect a peripheral neuropathy either.  Will check a B12 and supplement if deficient.    He already has a follow up scheduled with Orthopedics on 11/27.    RTC as needed.  Encouraged to reach out to me with any questions/concerns      Problem List Items Addressed This Visit          Neuro    Decreased sense of vibration    Relevant Orders    Vitamin B12 Deficiency Panel       Orthopedic    Chronic pain of left knee - Primary         Subjective:          HPI:   Mr. Oliver Burk Jr. is a 84 y.o. male with a history of AFib on Xarelto, HTN, aortic atherosclerosis, ROMEO, presenting for evaluation of left knee pain.    Complains of episodic pain in the left knee.  Describes it as ' having a mind of its own'.  Periodically gets stiff and throbs.  No associated swelling, erythema or tenderness.  No triggers.  Not related to physical activity or ambulating for long distances.  This has been present for a couple of years.  Often, this pain is relieved upon hyperflexion of the knee and extension at the hip.  Denies any associated low back pain or radiating pain in the lower extremities.  Denies any numbness/tingling or neuropathic pain in the  lower extremities.  Has chronic weakness in the distal lower extremities with regards to dorsiflexion and plantarflexion    History of lumbar spinal decompression surgery in 2004 and 2019.  Had an MRI of the lumbar spine last year which showed multilevel degenerative disc disease with mild/moderate spinal canal stenosis and multilevel neural foraminal narrowing.    PAST MEDICAL HISTORY:  Past Medical History:   Diagnosis Date    Arthritis     Atrial fibrillation     Basal cell carcinoma     Cancer     prostate    Hyperlipidemia     Hypertension     on medication       PAST SURGICAL HISTORY:  Past Surgical History:   Procedure Laterality Date    APPENDECTOMY      CATARACT EXTRACTION Right 01/14/2019    Dr. Mendez (symfony lens)    CATARACT EXTRACTION W/  INTRAOCULAR LENS IMPLANT Right 01/14/2019    Procedure: EXTRACTION, CATARACT, WITH IOL INSERTION;  Surgeon: Mu Mendez MD;  Location: [facility] OR;  Service: Ophthalmology;  Laterality: Right;  Laser    CATARACT EXTRACTION W/  INTRAOCULAR LENS IMPLANT Left 01/28/2019    Procedure: EXTRACTION, CATARACT, WITH IOL INSERTION;  Surgeon: Mu Mendez MD;  Location: [facility] OR;  Service: Ophthalmology;  Laterality: Left;  Laser    EYE SURGERY      LUMBAR LAMINECTOMY  2004 2019 dr pike    PROSTATE SURGERY  2010    prostate cancer - radiation seeds- low grade    REPAIR, HERNIA, INGUINAL, WITHOUT HISTORY OF PRIOR REPAIR, AGE 5 YEARS OR OLDER Left 02/24/2023    Procedure: REPAIR, HERNIA, INGUINAL, WITHOUT HISTORY OF PRIOR REPAIR, AGE 5  YEARS OR OLDER (WITH MESH);  Surgeon: Richar Stewart MD;  Location: [address];  Service: General;  Laterality: Left;    TREATMENT OF CARDIAC ARRHYTHMIA N/A 10/17/2022    Procedure: CARDIOVERSION;  Surgeon: ABDULLAHI Spangler MD;  Location: [facility] EP LAB;  Service: Cardiology;  Laterality: N/A;  afib, RAINER, DCCV, anes, DM, 3 Prep       CURRENT MEDS:  Current Outpatient Medications   Medication Sig Dispense Refill    ALPRAZolam (XANAX)  0.25 MG tablet Take 1 tablet (0.25 mg total) by mouth 2 (two) times daily as needed for Anxiety. Take one tablet hs for sleep 45 tablet 2    ascorbic acid, vitamin C, (VITAMIN C) 250 MG tablet Take 250 mg by mouth once daily. (Patient not taking: Reported on 9/25/2024)      azelastine (ASTELIN) 137 mcg (0.1 %) nasal spray 1 spray (137 mcg total) by Nasal route 2 (two) times daily. (Patient not taking: Reported on 9/25/2024) 30 mL 2    calcium citrate (CALCITRATE) 200 mg (950 mg) tablet Take 2 tablets by mouth once daily. (Patient not taking: Reported on 9/25/2024)      diclofenac sodium (VOLTAREN) 1 % Gel Apply 1 Tube topically 2 (two) times daily as needed. (Patient not taking: Reported on 9/25/2024)      eszopiclone (LUNESTA) 1 MG Tab Take 1 mg by mouth nightly.      gabapentin (NEURONTIN) 300 MG capsule Take 1 capsule (300 mg total) by mouth 3 (three) times daily as needed (radiating nerve pain from the back). (Patient not taking: Reported on 9/25/2024) 90 capsule 3    Lactobac no.41/Bifidobact no.7 (PROBIOTIC-10 ORAL) Take by mouth. (Patient not taking: Reported on 9/25/2024)      rivaroxaban (XARELTO) 20 mg Tab Take 1 tablet (20 mg total) by mouth daily with dinner or evening meal. 90 tablet 3    simvastatin (ZOCOR) 20 MG tablet Take 1 tablet (20 mg total) by mouth every evening. 90 tablet 3    tamsulosin (FLOMAX) 0.4 mg Cap Take 1 capsule (0.4 mg total) by mouth every evening. 90 capsule 3    verapamiL (VERELAN) 120 MG C24P Take 1 capsule (120 mg total) by mouth once daily. 90 capsule 3    verapamiL (VERELAN) 120 MG C24P Take 1 capsule (120 mg total) by mouth 2 (two) times a day. 180 capsule 3    vitamin D (VITAMIN D3) 1000 units Tab Take 1,000 Units by mouth once daily. (Patient not taking: Reported on 9/25/2024)       No current facility-administered medications for this visit.       ALLERGIES:  Review of patient's allergies indicates:  No Known Allergies    FAMILY HISTORY:  Family History   Problem Relation  "Name Age of Onset    COPD Mother 76     Osteoporosis Mother 76     Cancer Father 77         pancreas?    Heart disease Father 77     Heart failure Father 77     Glaucoma Neg Hx      Blindness Neg Hx      Cataracts Neg Hx      Diabetes Neg Hx         SOCIAL HISTORY:  Social History     Tobacco Use    Smoking status: Never    Smokeless tobacco: Never   Substance Use Topics    Alcohol use: Yes     Alcohol/week: 12.0 standard drinks of alcohol     Types: 5 Shots of liquor, 7 Standard drinks or equivalent per week     Comment: every other day    Drug use: No       Review of Systems:  12 system review of systems is negative except for the symptoms mentioned in HPI.      Objective:     Vitals:    10/22/24 1352   BP: 133/82   BP Location: Left arm   Patient Position: Sitting   Pulse: 76   Weight: 83 kg (182 lb 15.7 oz)   Height: 5' 11" (1.803 m)     General: NAD, well nourished   Eyes: no tearing, discharge, no erythema   Neck: Supple, full range of motion  Cardiovascular: Warm and well perfused  Lungs: Normal work of breathing  Skin: No rash, lesions, or breakdown on exposed skin  Psychiatry: Mood and affect are appropriate       NEUROLOGICAL EXAMINATION:     MENTAL STATUS   Oriented to person, place, and time.   Follows 2 step commands.   Speech: speech is normal   Level of consciousness: alert    CRANIAL NERVES     CN II   Visual fields full to confrontation.     CN III, IV, VI   Nystagmus: none   Ophthalmoparesis: none    CN V   Facial sensation intact.     CN VII   Facial expression full, symmetric.     MOTOR EXAM     Strength   Right deltoid: 5/5  Left deltoid: 5/5  Right biceps: 5/5  Left biceps: 5/5  Right triceps: 5/5  Left triceps: 5/5  Right wrist flexion: 5/5  Left wrist flexion: 5/5  Right wrist extension: 5/5  Left wrist extension: 5/5  Right iliopsoas: 5/5  Left iliopsoas: 5/5  Right quadriceps: 5/5  Left quadriceps: 5/5  Right hamstrin/5  Left hamstrin/5  Right glutei: 5/5  Left glutei: 5/5  Right " anterior tibial: 3/5  Left anterior tibial: 4/5  Right gastroc: 4/5  Left gastroc: 4/5    REFLEXES     Reflexes   Right brachioradialis: 1+  Left brachioradialis: 1+  Right biceps: 1+  Left biceps: 1+  Right patellar: 0  Left patellar: 0  Right achilles: 0  Left achilles: 0  Right plantar: equivocal  Left plantar: equivocal    SENSORY EXAM   Light touch normal.   Right leg vibration: decreased from ankle  Left leg vibration: decreased from ankle  Proprioception normal.   Romberg: negative    GAIT AND COORDINATION     Gait  Gait: non-neurologic     Coordination   Finger to nose coordination: normal    Tremor   Resting tremor: absent  Intention tremor: absent    @Amb SF PE Neuro@      Images:    Other Studies:          Satya Rosario MD  Department of Neurology  [facility]

## 2024-10-22 NOTE — TELEPHONE ENCOUNTER
----- Message from Maria Isabel Corral MD sent at 10/21/2024  4:26 PM CDT -----  Regarding: appt with neuro  Hi team,     Patient was requested to see a neurologist.  He has lumbar stenosis of the severe nature in his lower lumbar spine so I believe his bilateral mid leg pain is from spinal stenosis but he could have a secondary neuropathy going on as well.  He is requesting a neurologist so let us set him up with an MD who does peripheral neuropathy/lumbar radiculopathy for consultation and opinion.    Rekha can you assist?    Alejandra can you call Jonel and tell him I think he would be best served by a pain anesthesiologist as well and he should let us know if you would like to see this type of specialist in addition to Neurology.  Also beneficial thing to know with the nerve pain is coming from her called EMG and nerve conduction studies they take 3-4 months schedule so if you would like us to arrange for that that would be a good idea as well to get on the ball with both of those things in addition to consultation.  ----- Message -----  From: Alejandra Bartlett MA  Sent: 10/17/2024  10:40 AM CDT  To: Maria Isabel Corral MD    He is asking for referral to a neurologist for knee pain.  He does not want Ortho, said it's not an Ortho problem.   He did see a Neurosurgeon in 06/2023 for knee pain. The doc noted:    the pt reported acute gait dysfunction. He has also noted an associated pain to the bilateral knees. Described as an aching sensation. He has been attempting to massage his knee caps for relief. His pain does not radiate, although he does note intermittent numbness to his R foot/toes. He states this is chronic and unchanged. He has since been seen by Pain Management. He reports physical limitations 2/2 pain.    I have personally reviewed the MRI previous with the pt which shows degenerative disc disease. R>L foramen opening at L5/S1. Canal opening at L3/4 and L4/5. Postoperative changes of prior decompression without  complications seen.     Surgical intervention is not warranted at this time. I will refer the pt to [facility].      He is asking for Neurology, for specifically Dr. Lindsay who works with Dr. Fagan.    Please advise

## 2024-10-24 LAB — VIT B12 SERPL-MCNC: 244 NG/L (ref 180–914)

## 2024-10-25 LAB — METHYLMALONATE SERPL-SCNC: 0.16 NMOL/ML

## 2024-11-26 ENCOUNTER — PATIENT MESSAGE (OUTPATIENT)
Dept: ADMINISTRATIVE | Facility: HOSPITAL | Age: 84
End: 2024-11-26
Payer: MEDICARE

## 2024-12-03 DIAGNOSIS — F51.09 SITUATIONAL INSOMNIA: Primary | ICD-10-CM

## 2024-12-03 RX ORDER — SUVOREXANT 15 MG/1
1 TABLET, FILM COATED ORAL NIGHTLY PRN
Qty: 28 TABLET | Refills: 0 | Status: SHIPPED | OUTPATIENT
Start: 2024-12-03

## 2024-12-18 RX ORDER — GABAPENTIN 300 MG/1
CAPSULE ORAL
Qty: 90 CAPSULE | Refills: 3 | Status: SHIPPED | OUTPATIENT
Start: 2024-12-18

## 2024-12-18 NOTE — TELEPHONE ENCOUNTER
No care due was identified.  [facility] Embedded Care Due Messages. Reference number: [id number].   12/18/2024 11:12:47 AM CST

## 2025-01-10 ENCOUNTER — TELEPHONE (OUTPATIENT)
Dept: INTERNAL MEDICINE | Facility: CLINIC | Age: 85
End: 2025-01-10
Payer: MEDICARE

## 2025-01-10 NOTE — TELEPHONE ENCOUNTER
Donya,   Pt has been asking for a sleeping pill alternative to Xanax for weeks now.  I did a prior authorization on Belsomra at 15 mg at the beginning of December.  I do not know if it was authorize but it looks like it was for 28 tablets; however it looks like he did not pick it up?  Can you please call the pharmacy the [facility] on [employer] and see if it is covered and by how much even if it is only 28 pills per month.  And if not I will try something else I already tried him on Lunesta and it did not seem to take 2 it but I can do a higher dose of Lunesta.  He call the after hours phone again asking for a sleeping pill.

## 2025-01-14 DIAGNOSIS — Z00.00 ENCOUNTER FOR MEDICARE ANNUAL WELLNESS EXAM: ICD-10-CM

## 2025-01-15 ENCOUNTER — TELEPHONE (OUTPATIENT)
Dept: DERMATOLOGY | Facility: CLINIC | Age: 85
End: 2025-01-15
Payer: MEDICARE

## 2025-01-15 ENCOUNTER — PATIENT MESSAGE (OUTPATIENT)
Dept: DERMATOLOGY | Facility: CLINIC | Age: 85
End: 2025-01-15
Payer: MEDICARE

## 2025-01-15 NOTE — TELEPHONE ENCOUNTER
"----- Message from Ryan sent at 1/15/2025  2:53 PM CST -----  Consult/Advisory    Name Of Caller: Self    Contact Preference?: [phone]    Provider Name: Leroy    Does patient feel the need to be seen today? No    What is the nature of the call?: Returning call to Nany    Additional Notes:  "Thank you for all that you do for our patients"  "

## 2025-01-15 NOTE — TELEPHONE ENCOUNTER
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 Left message to give us a call back to get scheduled.

## 2025-01-15 NOTE — TELEPHONE ENCOUNTER
Over the phone consult completed. Scheduled for Mohs on 1/27 at 7:40. Confirmed date, time, and location. Reminder sent in mail.

## 2025-01-16 ENCOUNTER — TELEPHONE (OUTPATIENT)
Dept: DERMATOLOGY | Facility: CLINIC | Age: 85
End: 2025-01-16
Payer: MEDICARE

## 2025-01-16 NOTE — TELEPHONE ENCOUNTER
----- Message from Robby sent at 1/16/2025  2:28 PM CST -----  Regarding: Reschedule Existing Appointment  Contact: [phone]  Reschedule Existing Appointment     Current appt date: 1/27/2025     Type of appt : MOHS     Physician: Dr. Harper     Reason for rescheduling: Has a conflict     Caller: Oliver Burk     Contact Preference:  [phone]

## 2025-01-19 DIAGNOSIS — I48.0 PAROXYSMAL ATRIAL FIBRILLATION: ICD-10-CM

## 2025-01-20 RX ORDER — RIVAROXABAN 20 MG/1
TABLET, FILM COATED ORAL
Qty: 90 TABLET | Refills: 3 | Status: SHIPPED | OUTPATIENT
Start: 2025-01-20

## 2025-01-24 ENCOUNTER — TELEPHONE (OUTPATIENT)
Dept: DERMATOLOGY | Facility: CLINIC | Age: 85
End: 2025-01-24
Payer: MEDICARE

## 2025-01-24 NOTE — TELEPHONE ENCOUNTER
----- Message from Aliyah sent at 1/23/2025 11:12 AM CST -----  Regarding: pt advice  Contact: [phone]  Pt is calling to speak with someone in provider office in regards to prep for his procedure with Dr Harper on 1/29 pt stated he would like to know how to shower after procedure pt  is asking for a return call please call pt at [phone]

## 2025-01-29 ENCOUNTER — PROCEDURE VISIT (OUTPATIENT)
Dept: DERMATOLOGY | Facility: CLINIC | Age: 85
End: 2025-01-29
Payer: MEDICARE

## 2025-01-29 VITALS — SYSTOLIC BLOOD PRESSURE: 128 MMHG | DIASTOLIC BLOOD PRESSURE: 83 MMHG | HEART RATE: 94 BPM

## 2025-01-29 DIAGNOSIS — C44.629 SQUAMOUS CELL CARCINOMA OF LEFT UPPER EXTREMITY: Primary | ICD-10-CM

## 2025-01-29 PROCEDURE — 17313 MOHS 1 STAGE T/A/L: CPT | Mod: PBBFAC | Performed by: DERMATOLOGY

## 2025-01-29 PROCEDURE — 13121 CMPLX RPR S/A/L 2.6-7.5 CM: CPT | Mod: PBBFAC | Performed by: DERMATOLOGY

## 2025-01-29 PROCEDURE — 17313 MOHS 1 STAGE T/A/L: CPT | Mod: S$PBB,,, | Performed by: DERMATOLOGY

## 2025-01-29 PROCEDURE — 13121 CMPLX RPR S/A/L 2.6-7.5 CM: CPT | Mod: S$PBB,51,, | Performed by: DERMATOLOGY

## 2025-01-29 PROCEDURE — 99499 UNLISTED E&M SERVICE: CPT | Mod: S$PBB,,, | Performed by: DERMATOLOGY

## 2025-01-29 NOTE — PROGRESS NOTES
PROCEDURE: Mohs' Micrographic Surgery    INDICATION: Tumors with aggressive clinical behavior (rapidly growing, greater than 1 cm in diameter). Tumor with ill-defined borders. Tumor with aggressive histopathology. Aggressive histopathology including sclerosing, morpheaform/infiltrating, micronodular, superficial multicentric, poorly differentiated, basosquamous, or perineural invasion.    REFERRING PROVIDER: Luis Booker III, M.D.    CASE NUMBER:     ANESTHETIC: 3 cc 0.5% Lidocaine with Epi 1:200,000 mixed 1:1 with 0.5% Bupivacaine    SURGICAL PREP: Hibiclens    SURGEON: Bee Harper MD    ASSISTANTS: Cheryl Viera PA-C and Courtney De Paz, Surg Tech    PREOPERATIVE DIAGNOSIS: squamous cell carcinoma- poorly differentiated    POSTOPERATIVE DIAGNOSIS: squamous cell carcinoma    PATHOLOGIC DIAGNOSIS: squamous cell carcinoma- poorly differentiated    HISTOLOGY OF SPECIMENS IN FIRST STAGE:   Tumor Type:  No tumor seen.    STAGES OF MOHS' SURGERY PERFORMED: 1    TUMOR-FREE PLANE ACHIEVED: Yes    HEMOSTASIS: electrocoagulation     SPECIMENS: 2     LOCATION: left posterior forearm distal. Location verified with Dr. Booker's clinical photograph. Patient also verified location.    INITIAL LESION SIZE: 1.1 x 1.2 cm    FINAL DEFECT SIZE: 1.4 x 1.8 cm    WOUND REPAIR/DISPOSITION: The patient tolerated Mohs' Micrographic Surgery for a squamous cell carcinoma very well. When the tumor was completely removed, a repair of the surgical defect was undertaken.        PROCEDURE: Complex Linear Repair    INDICATION: Status post Mohs' Micrographic Surgery for squamous cell carcinoma.    CASE NUMBER:     SURGEON: Bee Harper MD    ASSISTANTS: Cheryl Viera PA-C and Courtney De Paz Surg Tech    ANESTHETIC: 1.5 cc 1% Lidocaine with Epinephrine 1:100,000    SURGICAL PREP: Hibiclens, prepped by Courtney De Paz, Surg Tech    LOCATION: left posterior forearm distal    DEFECT SIZE: 1.4 x 1.8 cm    WOUND REPAIR/DISPOSITION:  After  the patient's carcinoma had been completely removed with Mohs' Micrographic Surgery, a repair of the surgical defect was undertaken. The patient was returned to the operating suite where the area of left posterior forearm distal was prepped, draped, and anesthetized in the usual sterile fashion. The wound was widely undermined in all directions. The wound was undermined to a distance at least the maximum width of the defect as measured perpendicular to the closure line along at least one entire edge of the defect, in this case 2 cm. Then, electrocoagulation was used to obtain meticulous hemostasis. 4-0 Vicryl buried vertical mattress sutures were placed into the subcutaneous and dermal plane to close the wound and michael the cutaneous wound edge. Bilateral dog ears were identified and were removed by a standard Burow's triangle technique. The cutaneous wound edges were closed using interrupted 4-0 Prolene suture.    The patient tolerated the procedure well.    The area was cleaned and dressed appropriately and the patient was given wound care instructions, as well as appointment for follow-up evaluation and suture removal in 14 days.    LENGTH OF REPAIR: 3.5 cm    Vitals:    01/29/25 1222 01/29/25 1429   BP: (!) 156/85 128/83   BP Location: Right arm Right arm   Patient Position: Sitting Sitting   Pulse: 94 (P) 86

## 2025-02-10 DIAGNOSIS — F41.9 ANXIETY: Primary | ICD-10-CM

## 2025-02-10 RX ORDER — ALPRAZOLAM 0.25 MG/1
0.25 TABLET ORAL 2 TIMES DAILY PRN
Qty: 45 TABLET | Refills: 2 | Status: SHIPPED | OUTPATIENT
Start: 2025-02-10

## 2025-02-11 DIAGNOSIS — N40.0 BENIGN PROSTATIC HYPERPLASIA, UNSPECIFIED WHETHER LOWER URINARY TRACT SYMPTOMS PRESENT: ICD-10-CM

## 2025-02-11 RX ORDER — TAMSULOSIN HYDROCHLORIDE 0.4 MG/1
1 CAPSULE ORAL NIGHTLY
Qty: 90 CAPSULE | Refills: 1 | Status: SHIPPED | OUTPATIENT
Start: 2025-02-11

## 2025-02-11 NOTE — TELEPHONE ENCOUNTER
No care due was identified.  [facility] Embedded Care Due Messages. Reference number: [id number].   2/11/2025 3:26:57 AM CST

## 2025-02-11 NOTE — TELEPHONE ENCOUNTER
Refill Decision Note   Oliver Malikrigan  is requesting a refill authorization.  Brief Assessment and Rationale for Refill:  Approve     Medication Therapy Plan:         Comments:     Note composed:7:08 AM 02/11/2025

## 2025-02-12 ENCOUNTER — TELEPHONE (OUTPATIENT)
Dept: DERMATOLOGY | Facility: CLINIC | Age: 85
End: 2025-02-12
Payer: MEDICARE

## 2025-02-12 NOTE — TELEPHONE ENCOUNTER
Called pt because he had him scheduled for s/r today. Let us know he had them removed by Dr. Booker.

## 2025-02-13 DIAGNOSIS — E78.5 DYSLIPIDEMIA, GOAL LDL BELOW 100: ICD-10-CM

## 2025-02-13 RX ORDER — SIMVASTATIN 20 MG/1
20 TABLET, FILM COATED ORAL NIGHTLY
Qty: 90 TABLET | Refills: 3 | Status: SHIPPED | OUTPATIENT
Start: 2025-02-13 | End: 2026-02-13

## 2025-03-07 RX ORDER — METOPROLOL SUCCINATE 50 MG/1
50 TABLET, EXTENDED RELEASE ORAL 2 TIMES DAILY
COMMUNITY
Start: 2025-01-09 | End: 2025-03-07

## 2025-03-07 RX ORDER — METOPROLOL SUCCINATE 25 MG/1
25 TABLET, EXTENDED RELEASE ORAL 2 TIMES DAILY
Qty: 60 TABLET | Refills: 2 | Status: SHIPPED | OUTPATIENT
Start: 2025-03-07 | End: 2026-03-07

## 2025-03-07 RX ORDER — VERAPAMIL HCL 240 MG
240 TABLET, EXTENDED RELEASE ORAL DAILY
Qty: 30 TABLET | Refills: 2 | Status: SHIPPED | OUTPATIENT
Start: 2025-03-07 | End: 2026-03-07

## 2025-03-10 ENCOUNTER — TELEPHONE (OUTPATIENT)
Dept: INTERNAL MEDICINE | Facility: CLINIC | Age: 85
End: 2025-03-10
Payer: MEDICARE

## 2025-03-10 NOTE — TELEPHONE ENCOUNTER
Abhishek calling after hours phone. Can we call and triage him ?    Last Friday I decreased the metoprolol to 25 mg XR twice a day from 50 twice a day   And increased verapamil 240 from 120 XR daily

## 2025-03-16 ENCOUNTER — HOSPITAL ENCOUNTER (INPATIENT)
Facility: OTHER | Age: 85
LOS: 1 days | Discharge: HOME | DRG: 193 | End: 2025-03-17
Attending: EMERGENCY MEDICINE | Admitting: INTERNAL MEDICINE
Payer: MEDICARE

## 2025-03-16 DIAGNOSIS — R09.02 HYPOXIA: ICD-10-CM

## 2025-03-16 DIAGNOSIS — R53.1 WEAKNESS: ICD-10-CM

## 2025-03-16 DIAGNOSIS — J10.1 INFLUENZA A: Primary | ICD-10-CM

## 2025-03-16 DIAGNOSIS — R05.9 COUGH: ICD-10-CM

## 2025-03-16 PROBLEM — R53.81 DEBILITY: Status: ACTIVE | Noted: 2025-03-16

## 2025-03-16 PROBLEM — E78.5 HYPERLIPIDEMIA: Chronic | Status: ACTIVE | Noted: 2025-03-16

## 2025-03-16 PROBLEM — J96.01 ACUTE HYPOXEMIC RESPIRATORY FAILURE: Status: ACTIVE | Noted: 2025-03-16

## 2025-03-16 PROBLEM — Z98.890 POST-OPERATIVE STATE: Status: RESOLVED | Noted: 2019-01-28 | Resolved: 2025-03-16

## 2025-03-16 PROBLEM — N40.0 BPH (BENIGN PROSTATIC HYPERPLASIA): Chronic | Status: ACTIVE | Noted: 2025-03-16

## 2025-03-16 PROBLEM — I48.19 PERSISTENT ATRIAL FIBRILLATION: Chronic | Status: ACTIVE | Noted: 2022-10-07

## 2025-03-16 PROBLEM — N40.0 BPH (BENIGN PROSTATIC HYPERPLASIA): Status: ACTIVE | Noted: 2025-03-16

## 2025-03-16 PROBLEM — I48.21 PERMANENT ATRIAL FIBRILLATION: Status: ACTIVE | Noted: 2022-10-07

## 2025-03-16 PROBLEM — I70.0 AORTIC ATHEROSCLEROSIS: Chronic | Status: ACTIVE | Noted: 2024-09-25

## 2025-03-16 PROBLEM — I48.0 PAROXYSMAL ATRIAL FIBRILLATION: Chronic | Status: ACTIVE | Noted: 2022-10-07

## 2025-03-16 PROBLEM — R68.89 DECREASED ACTIVITY TOLERANCE: Status: RESOLVED | Noted: 2023-08-09 | Resolved: 2025-03-16

## 2025-03-16 PROBLEM — R29.898 DECREASED STRENGTH OF TRUNK AND BACK: Status: RESOLVED | Noted: 2023-08-09 | Resolved: 2025-03-16

## 2025-03-16 PROBLEM — E78.5 HYPERLIPIDEMIA: Status: ACTIVE | Noted: 2025-03-16

## 2025-03-16 PROBLEM — I48.0 PAROXYSMAL ATRIAL FIBRILLATION: Chronic | Status: ACTIVE | Noted: 2023-01-11

## 2025-03-16 PROBLEM — G47.33 OSA (OBSTRUCTIVE SLEEP APNEA): Chronic | Status: ACTIVE | Noted: 2023-01-11

## 2025-03-16 PROBLEM — G47.00 INSOMNIA: Status: ACTIVE | Noted: 2025-03-16

## 2025-03-16 PROBLEM — I48.21 PERMANENT ATRIAL FIBRILLATION: Chronic | Status: ACTIVE | Noted: 2022-10-07

## 2025-03-16 PROBLEM — I10 HTN (HYPERTENSION): Chronic | Status: ACTIVE | Noted: 2022-10-07

## 2025-03-16 LAB
ALBUMIN SERPL BCP-MCNC: 3.4 G/DL (ref 3.5–5.2)
ALP SERPL-CCNC: 76 U/L (ref 40–150)
ALT SERPL W/O P-5'-P-CCNC: 24 U/L (ref 10–44)
ANION GAP SERPL CALC-SCNC: 9 MMOL/L (ref 8–16)
AST SERPL-CCNC: 31 U/L (ref 10–40)
BASOPHILS # BLD AUTO: 0.02 K/UL (ref 0–0.2)
BASOPHILS NFR BLD: 0.3 % (ref 0–1.9)
BILIRUB SERPL-MCNC: 1.6 MG/DL (ref 0.1–1)
BNP SERPL-MCNC: 314 PG/ML (ref 0–99)
BUN SERPL-MCNC: 13 MG/DL (ref 8–23)
CALCIUM SERPL-MCNC: 9.3 MG/DL (ref 8.7–10.5)
CHLORIDE SERPL-SCNC: 100 MMOL/L (ref 95–110)
CK SERPL-CCNC: 169 U/L (ref 20–200)
CO2 SERPL-SCNC: 22 MMOL/L (ref 23–29)
CREAT SERPL-MCNC: 0.8 MG/DL (ref 0.5–1.4)
CTP QC/QA: YES
DIFFERENTIAL METHOD BLD: ABNORMAL
EOSINOPHIL # BLD AUTO: 0 K/UL (ref 0–0.5)
EOSINOPHIL NFR BLD: 0 % (ref 0–8)
ERYTHROCYTE [DISTWIDTH] IN BLOOD BY AUTOMATED COUNT: 12.5 % (ref 11.5–14.5)
EST. GFR  (NO RACE VARIABLE): >60 ML/MIN/1.73 M^2
GLUCOSE SERPL-MCNC: 128 MG/DL (ref 70–110)
HCT VFR BLD AUTO: 35.7 % (ref 40–54)
HGB BLD-MCNC: 12.2 G/DL (ref 14–18)
IMM GRANULOCYTES # BLD AUTO: 0.02 K/UL (ref 0–0.04)
IMM GRANULOCYTES NFR BLD AUTO: 0.3 % (ref 0–0.5)
INFLUENZA A, MOLECULAR: POSITIVE
INFLUENZA B, MOLECULAR: NEGATIVE
LYMPHOCYTES # BLD AUTO: 1 K/UL (ref 1–4.8)
LYMPHOCYTES NFR BLD: 13.3 % (ref 18–48)
MCH RBC QN AUTO: 33.9 PG (ref 27–31)
MCHC RBC AUTO-ENTMCNC: 34.2 G/DL (ref 32–36)
MCV RBC AUTO: 99 FL (ref 82–98)
MONOCYTES # BLD AUTO: 1.5 K/UL (ref 0.3–1)
MONOCYTES NFR BLD: 20.4 % (ref 4–15)
NEUTROPHILS # BLD AUTO: 4.7 K/UL (ref 1.8–7.7)
NEUTROPHILS NFR BLD: 65.7 % (ref 38–73)
NRBC BLD-RTO: 0 /100 WBC
PLATELET # BLD AUTO: 131 K/UL (ref 150–450)
PMV BLD AUTO: 10.5 FL (ref 9.2–12.9)
POTASSIUM SERPL-SCNC: 4.1 MMOL/L (ref 3.5–5.1)
PROT SERPL-MCNC: 6.5 G/DL (ref 6–8.4)
RBC # BLD AUTO: 3.6 M/UL (ref 4.6–6.2)
SARS-COV-2 RDRP RESP QL NAA+PROBE: NEGATIVE
SODIUM SERPL-SCNC: 131 MMOL/L (ref 136–145)
SPECIMEN SOURCE: ABNORMAL
TROPONIN I SERPL DL<=0.01 NG/ML-MCNC: 0.02 NG/ML (ref 0–0.03)
WBC # BLD AUTO: 7.14 K/UL (ref 3.9–12.7)

## 2025-03-16 PROCEDURE — 87502 INFLUENZA DNA AMP PROBE: CPT | Performed by: EMERGENCY MEDICINE

## 2025-03-16 PROCEDURE — 11000001 HC ACUTE MED/SURG PRIVATE ROOM

## 2025-03-16 PROCEDURE — 94640 AIRWAY INHALATION TREATMENT: CPT

## 2025-03-16 PROCEDURE — 27000646 HC AEROBIKA DEVICE

## 2025-03-16 PROCEDURE — 94799 UNLISTED PULMONARY SVC/PX: CPT

## 2025-03-16 PROCEDURE — 63600175 PHARM REV CODE 636 W HCPCS: Performed by: EMERGENCY MEDICINE

## 2025-03-16 PROCEDURE — 99900035 HC TECH TIME PER 15 MIN (STAT)

## 2025-03-16 PROCEDURE — 25000003 PHARM REV CODE 250: Performed by: INTERNAL MEDICINE

## 2025-03-16 PROCEDURE — 99291 CRITICAL CARE FIRST HOUR: CPT

## 2025-03-16 PROCEDURE — 85025 COMPLETE CBC W/AUTO DIFF WBC: CPT | Performed by: EMERGENCY MEDICINE

## 2025-03-16 PROCEDURE — 87635 SARS-COV-2 COVID-19 AMP PRB: CPT | Performed by: EMERGENCY MEDICINE

## 2025-03-16 PROCEDURE — 82550 ASSAY OF CK (CPK): CPT | Performed by: EMERGENCY MEDICINE

## 2025-03-16 PROCEDURE — 25000242 PHARM REV CODE 250 ALT 637 W/ HCPCS: Performed by: EMERGENCY MEDICINE

## 2025-03-16 PROCEDURE — 25000003 PHARM REV CODE 250: Performed by: EMERGENCY MEDICINE

## 2025-03-16 PROCEDURE — 84484 ASSAY OF TROPONIN QUANT: CPT | Performed by: EMERGENCY MEDICINE

## 2025-03-16 PROCEDURE — 93010 ELECTROCARDIOGRAM REPORT: CPT | Mod: ,,, | Performed by: INTERNAL MEDICINE

## 2025-03-16 PROCEDURE — 80053 COMPREHEN METABOLIC PANEL: CPT | Performed by: EMERGENCY MEDICINE

## 2025-03-16 PROCEDURE — 94761 N-INVAS EAR/PLS OXIMETRY MLT: CPT

## 2025-03-16 PROCEDURE — 94664 DEMO&/EVAL PT USE INHALER: CPT

## 2025-03-16 PROCEDURE — 83880 ASSAY OF NATRIURETIC PEPTIDE: CPT | Performed by: EMERGENCY MEDICINE

## 2025-03-16 PROCEDURE — 96374 THER/PROPH/DIAG INJ IV PUSH: CPT

## 2025-03-16 PROCEDURE — 93005 ELECTROCARDIOGRAM TRACING: CPT

## 2025-03-16 PROCEDURE — 27000221 HC OXYGEN, UP TO 24 HOURS

## 2025-03-16 RX ORDER — ONDANSETRON 4 MG/1
4 TABLET, ORALLY DISINTEGRATING ORAL EVERY 6 HOURS PRN
Status: DISCONTINUED | OUTPATIENT
Start: 2025-03-16 | End: 2025-03-17 | Stop reason: HOSPADM

## 2025-03-16 RX ORDER — ONDANSETRON HYDROCHLORIDE 2 MG/ML
4 INJECTION, SOLUTION INTRAVENOUS EVERY 6 HOURS PRN
Status: DISCONTINUED | OUTPATIENT
Start: 2025-03-16 | End: 2025-03-17 | Stop reason: HOSPADM

## 2025-03-16 RX ORDER — METOPROLOL TARTRATE 1 MG/ML
5 INJECTION, SOLUTION INTRAVENOUS
Status: COMPLETED | OUTPATIENT
Start: 2025-03-16 | End: 2025-03-16

## 2025-03-16 RX ORDER — BENZONATATE 100 MG/1
200 CAPSULE ORAL 3 TIMES DAILY PRN
Status: DISCONTINUED | OUTPATIENT
Start: 2025-03-16 | End: 2025-03-17 | Stop reason: HOSPADM

## 2025-03-16 RX ORDER — TALC
6 POWDER (GRAM) TOPICAL NIGHTLY PRN
Status: DISCONTINUED | OUTPATIENT
Start: 2025-03-16 | End: 2025-03-17 | Stop reason: HOSPADM

## 2025-03-16 RX ORDER — TAMSULOSIN HYDROCHLORIDE 0.4 MG/1
1 CAPSULE ORAL NIGHTLY
Status: DISCONTINUED | OUTPATIENT
Start: 2025-03-16 | End: 2025-03-17 | Stop reason: HOSPADM

## 2025-03-16 RX ORDER — IPRATROPIUM BROMIDE AND ALBUTEROL SULFATE 2.5; .5 MG/3ML; MG/3ML
3 SOLUTION RESPIRATORY (INHALATION)
Status: COMPLETED | OUTPATIENT
Start: 2025-03-16 | End: 2025-03-16

## 2025-03-16 RX ORDER — METOPROLOL TARTRATE 1 MG/ML
5 INJECTION, SOLUTION INTRAVENOUS EVERY 4 HOURS PRN
Status: DISCONTINUED | OUTPATIENT
Start: 2025-03-16 | End: 2025-03-17 | Stop reason: HOSPADM

## 2025-03-16 RX ORDER — ATORVASTATIN CALCIUM 10 MG/1
10 TABLET, FILM COATED ORAL NIGHTLY
Status: DISCONTINUED | OUTPATIENT
Start: 2025-03-16 | End: 2025-03-17 | Stop reason: HOSPADM

## 2025-03-16 RX ORDER — ACETAMINOPHEN 325 MG/1
650 TABLET ORAL EVERY 4 HOURS PRN
Status: DISCONTINUED | OUTPATIENT
Start: 2025-03-16 | End: 2025-03-17 | Stop reason: HOSPADM

## 2025-03-16 RX ORDER — SODIUM CHLORIDE 0.9 % (FLUSH) 0.9 %
10 SYRINGE (ML) INJECTION
Status: DISCONTINUED | OUTPATIENT
Start: 2025-03-16 | End: 2025-03-17 | Stop reason: HOSPADM

## 2025-03-16 RX ORDER — METOPROLOL SUCCINATE 25 MG/1
25 TABLET, EXTENDED RELEASE ORAL 2 TIMES DAILY
Status: DISCONTINUED | OUTPATIENT
Start: 2025-03-16 | End: 2025-03-17 | Stop reason: HOSPADM

## 2025-03-16 RX ORDER — GABAPENTIN 300 MG/1
300 CAPSULE ORAL DAILY
Status: DISCONTINUED | OUTPATIENT
Start: 2025-03-17 | End: 2025-03-17 | Stop reason: HOSPADM

## 2025-03-16 RX ORDER — OSELTAMIVIR PHOSPHATE 75 MG/1
75 CAPSULE ORAL 2 TIMES DAILY
Status: DISCONTINUED | OUTPATIENT
Start: 2025-03-16 | End: 2025-03-17 | Stop reason: HOSPADM

## 2025-03-16 RX ORDER — ACETAMINOPHEN 500 MG
1000 TABLET ORAL
Status: COMPLETED | OUTPATIENT
Start: 2025-03-16 | End: 2025-03-16

## 2025-03-16 RX ORDER — HYDROCHLOROTHIAZIDE 25 MG/1
240 TABLET ORAL NIGHTLY
Status: DISCONTINUED | OUTPATIENT
Start: 2025-03-16 | End: 2025-03-17 | Stop reason: HOSPADM

## 2025-03-16 RX ORDER — ALPRAZOLAM 0.25 MG/1
0.25 TABLET ORAL 2 TIMES DAILY PRN
Status: DISCONTINUED | OUTPATIENT
Start: 2025-03-16 | End: 2025-03-17 | Stop reason: HOSPADM

## 2025-03-16 RX ORDER — IPRATROPIUM BROMIDE AND ALBUTEROL SULFATE 2.5; .5 MG/3ML; MG/3ML
3 SOLUTION RESPIRATORY (INHALATION) EVERY 4 HOURS PRN
Status: DISCONTINUED | OUTPATIENT
Start: 2025-03-16 | End: 2025-03-17 | Stop reason: HOSPADM

## 2025-03-16 RX ORDER — GUAIFENESIN AND DEXTROMETHORPHAN HYDROBROMIDE 10; 100 MG/5ML; MG/5ML
10 SYRUP ORAL EVERY 4 HOURS PRN
Status: DISCONTINUED | OUTPATIENT
Start: 2025-03-16 | End: 2025-03-17 | Stop reason: HOSPADM

## 2025-03-16 RX ORDER — OSELTAMIVIR PHOSPHATE 75 MG/1
75 CAPSULE ORAL
Status: COMPLETED | OUTPATIENT
Start: 2025-03-16 | End: 2025-03-16

## 2025-03-16 RX ADMIN — METOPROLOL SUCCINATE 25 MG: 25 TABLET, EXTENDED RELEASE ORAL at 11:03

## 2025-03-16 RX ADMIN — OSELTAMIVIR PHOSPHATE 75 MG: 75 CAPSULE ORAL at 11:03

## 2025-03-16 RX ADMIN — VERAPAMIL HYDROCHLORIDE 240 MG: 240 TABLET, FILM COATED, EXTENDED RELEASE ORAL at 11:03

## 2025-03-16 RX ADMIN — METOROPROLOL TARTRATE 5 MG: 5 INJECTION, SOLUTION INTRAVENOUS at 01:03

## 2025-03-16 RX ADMIN — SODIUM CHLORIDE, SODIUM LACTATE, POTASSIUM CHLORIDE, AND CALCIUM CHLORIDE 500 ML: .6; .31; .03; .02 INJECTION, SOLUTION INTRAVENOUS at 10:03

## 2025-03-16 RX ADMIN — ALPRAZOLAM 0.25 MG: 0.25 TABLET ORAL at 09:03

## 2025-03-16 RX ADMIN — OSELTAMIVIR PHOSPHATE 75 MG: 75 CAPSULE ORAL at 01:03

## 2025-03-16 RX ADMIN — RIVAROXABAN 20 MG: 10 TABLET, FILM COATED ORAL at 06:03

## 2025-03-16 RX ADMIN — ATORVASTATIN CALCIUM 10 MG: 10 TABLET, FILM COATED ORAL at 11:03

## 2025-03-16 RX ADMIN — ACETAMINOPHEN 1000 MG: 500 TABLET, FILM COATED ORAL at 10:03

## 2025-03-16 RX ADMIN — TAMSULOSIN HYDROCHLORIDE 0.4 MG: 0.4 CAPSULE ORAL at 11:03

## 2025-03-16 RX ADMIN — IPRATROPIUM BROMIDE AND ALBUTEROL SULFATE 3 ML: 2.5; .5 SOLUTION RESPIRATORY (INHALATION) at 01:03

## 2025-03-16 NOTE — HPI
Mr. Burk is an 84/M with PMH HTN, HLD, permanent A-fib (on rivaroxaban), ROMEO (unable to tolerate CPAP), BPH, spinal stenosis with neuropathy who presented to [facility] 03/16 with a three day history of fatigue, fevers, chills, and weakness. He notes a sick contact at work and temperatures of 100.2 and 100.5F at home, though no significant chills or diaphoresis. He attempted acetaminophen for management at home. He notes predominantly nonproductive cough with congestion in addition. He notes falling twice at home due to weakness and knee discomfort and difficulty with his gait, but denies any injuries from his falls. With persistent symptoms he presented to ED for further evaluation. ED workup was notable for hypoxia which improved on 3L O2 to 91-98%, atrial fibrillation with RVR, + influenza A, CXR with bilateral infiltrates, and mild hyponatremia on metabolic panel. He received oseltamivir, albuterol-ipratropium nebulization, and metoprolol 5mg IV x1 with improvement in HR to baseline . Hospital medicine was subsequently contacted for admission.

## 2025-03-16 NOTE — ASSESSMENT & PLAN NOTE
- Unable to tolerate CPAP outpatient. Supplemental O2 overnight as required.  - Continue alprazolam 0.25mg PO BID PRN anxiety/insomnia. If persistent issues, obtain suvorexant from home to use as non-formulary.

## 2025-03-16 NOTE — ED TRIAGE NOTES
Pt reports to ED with generalized weakness, cough, and congestion x 2 days. Hypoxic on room air at 93%. States he had a positive flu test yesterday. Denies chest pain or SOB at this time.

## 2025-03-16 NOTE — ASSESSMENT & PLAN NOTE
- No use of CPAP at home.  - Supplemental O2 as required overnight.  - Continue alprazolam 0.25mg PO BID PRN for anxiety/insomnia. If required, obtain home suvorexant as able given non-formulary.

## 2025-03-16 NOTE — ASSESSMENT & PLAN NOTE
- Acute hypoxemic respiratory failure in setting of influenza A requiring 3L O2 via NC.  - Continue oseltamivir 75mg PO BID, albuterol-ipratropium 2.5-0.5mg inhaled q4hr PRN. Start incentive spirometry, flutter valve treatments.  - Start guaifenesin-dextromethorphan 10mL PO q4hr PRN, benzonatate 200mg PO TID PRN cough.  - Wean supplemental O2 as tolerated.  - Atrial fibrillation at baseline managed with metoprolol 25mg PO BID and verapamil 250mg PO qHS; likely RVR in response to acute illness with influenza. Responded well to metoprolol 5mg IV. Continue as 5mg IV q4hr PRN for sustained HR > 130.  - PT/OT evaluations given significant weakness.

## 2025-03-16 NOTE — SUBJECTIVE & OBJECTIVE
Past Medical History:   Diagnosis Date    Arthritis     Atrial fibrillation     Basal cell carcinoma     Cancer     prostate    Hyperlipidemia     Hypertension     on medication    SCC (squamous cell carcinoma) 01/29/2025    L posterior forearm distal       Past Surgical History:   Procedure Laterality Date    APPENDECTOMY      CATARACT EXTRACTION Right 01/14/2019    Dr. Mendez (symfony lens)    CATARACT EXTRACTION W/  INTRAOCULAR LENS IMPLANT Right 01/14/2019    Procedure: EXTRACTION, CATARACT, WITH IOL INSERTION;  Surgeon: Mu Mendez MD;  Location: [facility] OR;  Service: Ophthalmology;  Laterality: Right;  Laser    CATARACT EXTRACTION W/  INTRAOCULAR LENS IMPLANT Left 01/28/2019    Procedure: EXTRACTION, CATARACT, WITH IOL INSERTION;  Surgeon: Mu Mendez MD;  Location: [facility] OR;  Service: Ophthalmology;  Laterality: Left;  Laser    EYE SURGERY      LUMBAR LAMINECTOMY  2004 2019 dr pike    PROSTATE SURGERY  2010    prostate cancer - radiation seeds- low grade    REPAIR, HERNIA, INGUINAL, WITHOUT HISTORY OF PRIOR REPAIR, AGE 7 YEARS OR OLDER Left 02/24/2023    Procedure: REPAIR, HERNIA, INGUINAL, WITHOUT HISTORY OF PRIOR REPAIR, AGE 5  YEARS OR OLDER (WITH MESH);  Surgeon: Richar Stewart MD;  Location: [facility] OR [facility] FLR;  Service: General;  Laterality: Left;    TREATMENT OF CARDIAC ARRHYTHMIA N/A 10/17/2022    Procedure: CARDIOVERSION;  Surgeon: ABDULLAHI Spangler MD;  Location: [facility] EP LAB;  Service: Cardiology;  Laterality: N/A;  afib, RAINER, DCCV, anes, DM, 3 Prep       Review of patient's allergies indicates:  No Known Allergies    No current facility-administered medications on file prior to encounter.     Current Outpatient Medications on File Prior to Encounter   Medication Sig    ALPRAZolam (XANAX) 0.25 MG tablet Take 1 tablet (0.25 mg total) by mouth 2 (two) times daily as needed for Anxiety. Take one tablet hs for sleep    ascorbic acid, vitamin C, (VITAMIN C) 250 MG tablet Take 250 mg by mouth  once daily.    azelastine (ASTELIN) 137 mcg (0.1 %) nasal spray 1 spray (137 mcg total) by Nasal route 2 (two) times daily. (Patient not taking: Reported on 9/25/2024)    calcium citrate (CALCITRATE) 200 mg (950 mg) tablet Take 2 tablets by mouth once daily.    diclofenac sodium (VOLTAREN) 1 % Gel Apply 1 Tube topically 2 (two) times daily as needed.    gabapentin (NEURONTIN) 300 MG capsule TAKE 1 CAPSULE BY MOUTH 3 TIMES DAILY AS NEEDED    Lactobac no.41/Bifidobact no.7 (PROBIOTIC-10 ORAL) Take by mouth.    metoprolol succinate (TOPROL-XL) 25 MG 24 hr tablet Take 1 tablet (25 mg total) by mouth 2 (two) times daily.    simvastatin (ZOCOR) 20 MG tablet Take 1 tablet (20 mg total) by mouth every evening.    suvorexant (BELSOMRA) 15 mg Tab Take 1 tablet by mouth nightly as needed.    tamsulosin (FLOMAX) 0.4 mg Cap Take 1 capsule (0.4 mg total) by mouth every evening.    verapamiL (CALAN-SR) 240 MG CR tablet Take 1 tablet (240 mg total) by mouth once daily.    vitamin D (VITAMIN D3) 1000 units Tab Take 1,000 Units by mouth once daily.    XARELTO 20 mg Tab TAKE 1 TABLET BY MOUTH EVERY DAY WITH DINNER OR EVENING MEAL     Family History       Problem Relation (Age of Onset)    COPD Mother    Cancer Father    Heart disease Father    Heart failure Father    Osteoporosis Mother          Tobacco Use    Smoking status: Never    Smokeless tobacco: Never   Substance and Sexual Activity    Alcohol use: Yes     Alcohol/week: 12.0 standard drinks of alcohol     Types: 5 Shots of liquor, 7 Standard drinks or equivalent per week     Comment: every other day    Drug use: No    Sexual activity: Yes     Partners: Female     Review of Systems   Constitutional:  Positive for fatigue and fever. Negative for chills.   HENT:  Negative for sore throat and trouble swallowing.    Eyes:  Negative for pain and visual disturbance.   Respiratory:  Negative for cough and shortness of breath.    Cardiovascular:  Negative for chest pain and  palpitations.   Gastrointestinal:  Negative for abdominal pain, nausea and vomiting.   Genitourinary:  Negative for difficulty urinating and dysuria.   Musculoskeletal:  Positive for gait problem. Negative for arthralgias and joint swelling.   Skin:  Negative for rash and wound.   Neurological:  Negative for weakness and numbness.     Objective:     Vital Signs (Most Recent):  Temp: 99.6 °F (37.6 °C) (03/16/25 0938)  Pulse: 89 (03/16/25 1652)  Resp: 20 (03/16/25 1652)  BP: 138/86 (03/16/25 1400)  SpO2: 98 % (03/16/25 1652) Vital Signs (24h Range):  Temp:  [99.6 °F (37.6 °C)] 99.6 °F (37.6 °C)  Pulse:  [] 89  Resp:  [14-25] 20  SpO2:  [91 %-98 %] 98 %  BP: (110-145)/(62-86) 138/86     Weight: 82.6 kg (182 lb)  Body mass index is 25.38 kg/m².     Physical Exam  Vitals and nursing note reviewed.   Constitutional:       General: He is sleeping. He is not in acute distress.     Appearance: He is well-developed.   HENT:      Head: Normocephalic and atraumatic.   Eyes:      General:         Right eye: No discharge.         Left eye: No discharge.      Conjunctiva/sclera: Conjunctivae normal.   Cardiovascular:      Rate and Rhythm: Normal rate. Rhythm irregularly irregular.      Pulses: Normal pulses.   Pulmonary:      Effort: Pulmonary effort is normal. No respiratory distress.      Comments: Diminished bilateral bases. Faint expiratory wheezes.  Abdominal:      Palpations: Abdomen is soft.      Tenderness: There is no abdominal tenderness.   Musculoskeletal:         General: Normal range of motion.      Right lower leg: Edema (trace) present.      Left lower leg: Edema (trace) present.   Skin:     General: Skin is warm and dry.   Neurological:      Mental Status: He is oriented to person, place, and time and easily aroused.        Significant Labs:   CBC:  Recent Labs   Lab 03/16/25  1003   WBC 7.14   HGB 12.2*   HCT 35.7*   *   GRAN 65.7  4.7   LYMPH 13.3*  1.0   MONO 20.4*  1.5*   EOS 0.0   BASO 0.02    CMP:  Recent Labs   Lab 03/16/25  1003   *   K 4.1      CO2 22*   BUN 13   CREATININE 0.8   *   CALCIUM 9.3   ALKPHOS 76   AST 31   ALT 24   BILITOT 1.6*   PROT 6.5   ALBUMIN 3.4*   ANIONGAP 9      Significant Imaging: I have reviewed and interpreted all pertinent imaging results/findings within the past 24 hours.  Imaging Results              X-Ray Chest AP Portable (Final result)  Result time 03/16/25 11:29:16      Final result by Garrett Ferrari MD (03/16/25 11:29:16)                   Impression:      Bilateral perihilar and lower lung interstitial findings.  Correlate for non interstitial pneumonitis or early interstitial edema.      Electronically signed by: Garrett Ferrari MD  Date:    03/16/2025  Time:    11:29               Narrative:    EXAMINATION:  XR CHEST AP PORTABLE    CLINICAL HISTORY:  Cough, unspecified    TECHNIQUE:  Single frontal view of the chest was performed.    COMPARISON:  05/29/2018    FINDINGS:  Cardiac silhouette and mediastinal contours normal for portable technique.  There is mild perihilar interstitial prominence with bibasilar interstitial opacity greater at the left lung base.

## 2025-03-16 NOTE — ED PROVIDER NOTES
Encounter Date: 3/16/2025    SCRIBE #1 NOTE: I, Maggie Shah, am scribing for, and in the presence of,  Jean Marie Manzanares MD. I have scribed the following portions of the note - Other sections scribed: HPI, ROS.       History     Chief Complaint   Patient presents with    Fatigue     Weakness, congestion, cough x2 days. RA SpO2 93%, improved to 98% on 3L NC. A-fib on monitor. No other medical hx.     This is a 84 y.o. male with history of hypertension, hyperlipidemia, A-fib, and CA who presents with complaint of fatigue for the past three days. He has associated weakness and gait issues. He reports two falls due to knee pain, weakness, and inability to balance himself. He denies any injuries from these falls. Patient also reports cough, congestion, and fevers that began two days ago. He notes possible sick contact from someone in his office. He attempted treatment with Tylenol.  He denies nausea, diarrhea, urinary symptoms, or difficulty breathing. He is not having any other pain. He is compliant with his daily medications including Xarelto, Metoprolol, Gabapentin, and Simvastatin. He has a surgical history of  Lumbar laminectomy. He does not smoke but notes that he drinks every other day. This is the extent of the patient's complaints at this time.          The history is provided by the patient. No  was used.     Review of patient's allergies indicates:  No Known Allergies  Past Medical History:   Diagnosis Date    Arthritis     Atrial fibrillation     Basal cell carcinoma     Cancer     prostate    Hyperlipidemia     Hypertension     on medication    SCC (squamous cell carcinoma) 01/29/2025    L posterior forearm distal     Past Surgical History:   Procedure Laterality Date    APPENDECTOMY      CATARACT EXTRACTION Right 01/14/2019    Dr. Mendez (symfony lens)    CATARACT EXTRACTION W/  INTRAOCULAR LENS IMPLANT Right 01/14/2019    Procedure: EXTRACTION, CATARACT, WITH IOL INSERTION;  Surgeon:  Mu Mendez MD;  Location: [facility], [place], operated by [facility] OR;  Service: Ophthalmology;  Laterality: Right;  Laser    CATARACT EXTRACTION W/  INTRAOCULAR LENS IMPLANT Left 01/28/2019    Procedure: EXTRACTION, CATARACT, WITH IOL INSERTION;  Surgeon: Mu Mendez MD;  Location: [facility], [place], operated by [facility] OR;  Service: Ophthalmology;  Laterality: Left;  Laser    EYE SURGERY      LUMBAR LAMINECTOMY  2004 2019 dr pike    PROSTATE SURGERY  2010    prostate cancer - radiation seeds- low grade    REPAIR, HERNIA, INGUINAL, WITHOUT HISTORY OF PRIOR REPAIR, AGE 7 YEARS OR OLDER Left 02/24/2023    Procedure: REPAIR, HERNIA, INGUINAL, WITHOUT HISTORY OF PRIOR REPAIR, AGE 5  YEARS OR OLDER (WITH MESH);  Surgeon: Richar Stewart MD;  Location: [facility] 2ND FLR;  Service: General;  Laterality: Left;    TREATMENT OF CARDIAC ARRHYTHMIA N/A 10/17/2022    Procedure: CARDIOVERSION;  Surgeon: ABDULLAHI Spangler MD;  Location: [facility] EP LAB;  Service: Cardiology;  Laterality: N/A;  afib, RAINER, DCCV, anes, DM, 3 Prep     Family History   Problem Relation Name Age of Onset    COPD Mother 76     Osteoporosis Mother 76     Cancer Father 77         pancreas?    Heart disease Father 77     Heart failure Father 77     Glaucoma Neg Hx      Blindness Neg Hx      Cataracts Neg Hx      Diabetes Neg Hx       Social History[1]  Review of Systems  Constitutional-fever, fatigue  HEENT-congestion, cough  Eyes-no redness  Respiratory-no shortness of breath  Cardio-no chest pain  GI-no abdominal pain  Endocrine-no cold intolerance  -no difficulty urinating  MSK-no myalgias, arthralgias  Skin-no rashes  Allergy-no environmental allergy  Neurologic-, no headache, weakness, gait issues  Hematology-no swollen nodes  Behavioral-no confusion    Physical Exam     Initial Vitals [03/16/25 0938]   BP Pulse Resp Temp SpO2   110/80 110 20 99.6 °F (37.6 °C) 98 %      MAP       --         Physical Exam  Constitutional: generally well appearing 85 yo man in moderate distress  Eyes: Conjunctivae  normal.  ENT       Head: Normocephalic, atraumatic.       Nose: Normal external appearance        Mouth/Throat: no strigulous respirations   Hematological/Lymphatic/Immunilogical: no visible lymphadenopathy   Cardiovascular: Normal rate,   Respiratory: increased respiratory effort faint wheezes throughout the lung field  Gastrointestinal: non distended   Musculoskeletal: Normal range of motion in all extremities. No obvious deformities or swelling.  Neurologic: Alert, oriented. Normal speech and language. No gross focal neurologic deficits are appreciated.  Skin: Skin is warm, dry. No rash noted.  Psychiatric: Mood and affect are normal.    ED Course   Critical Care    Date/Time: 3/16/2025 11:00 AM    Performed by: Jean Marie Manzanares MD  Authorized by: Jean Marie Manzanares MD  Direct patient critical care time: 25 minutes  Additional history critical care time: 5 minutes  Ordering / reviewing critical care time: 5 minutes  Documentation critical care time: 3 minutes  Consulting other physicians critical care time: 4 minutes  Total critical care time (exclusive of procedural time) : 42 minutes  Critical care time was exclusive of separately billable procedures and treating other patients and teaching time.  Critical care was necessary to treat or prevent imminent or life-threatening deterioration of the following conditions: respiratory failure.  Critical care was time spent personally by me on the following activities: blood draw for specimens, development of treatment plan with patient or surrogate, discussions with primary provider, interpretation of cardiac output measurements, examination of patient, evaluation of patient's response to treatment, obtaining history from patient or surrogate, ordering and performing treatments and interventions, ordering and review of laboratory studies, ordering and review of radiographic studies, pulse oximetry, re-evaluation of patient's condition and review of old  charts.        Labs Reviewed   INFLUENZA A & B BY MOLECULAR - Abnormal       Result Value    Influenza A, Molecular Positive (*)     Influenza B, Molecular Negative      Flu A & B Source Nasal Swab     CBC W/ AUTO DIFFERENTIAL - Abnormal    WBC 7.14      RBC 3.60 (*)     Hemoglobin 12.2 (*)     Hematocrit 35.7 (*)     MCV 99 (*)     MCH 33.9 (*)     MCHC 34.2      RDW 12.5      Platelets 131 (*)     MPV 10.5      Immature Granulocytes 0.3      Gran # (ANC) 4.7      Immature Grans (Abs) 0.02      Lymph # 1.0      Mono # 1.5 (*)     Eos # 0.0      Baso # 0.02      nRBC 0      Gran % 65.7      Lymph % 13.3 (*)     Mono % 20.4 (*)     Eosinophil % 0.0      Basophil % 0.3      Differential Method Automated     COMPREHENSIVE METABOLIC PANEL - Abnormal    Sodium 131 (*)     Potassium 4.1      Chloride 100      CO2 22 (*)     Glucose 128 (*)     BUN 13      Creatinine 0.8      Calcium 9.3      Total Protein 6.5      Albumin 3.4 (*)     Total Bilirubin 1.6 (*)     Alkaline Phosphatase 76      AST 31      ALT 24      eGFR >60      Anion Gap 9     B-TYPE NATRIURETIC PEPTIDE - Abnormal     (*)    CK         TROPONIN I    Troponin I 0.016     SARS-COV-2 RDRP GENE    POC Rapid COVID Negative       Acceptable Yes          ECG Results              EKG 12-lead (Preliminary result)  Result time 03/16/25 13:30:14      Wet Read by Jean Marie Manzanares MD (03/16/25 13:30:14, [facility] Emergency Dept, Emergency Medicine)    My EKG interpretation, atrial fibrillation, 90 beats per minute, normal axis, no ST segment changes, when compared to previous EKG September 25, 2024 relatively unchanged EKG                                  Imaging Results              X-Ray Chest AP Portable (Final result)  Result time 03/16/25 11:29:16      Final result by Garrett Ferrari MD (03/16/25 11:29:16)                   Impression:      Bilateral perihilar and lower lung interstitial findings.  Correlate for non  interstitial pneumonitis or early interstitial edema.      Electronically signed by: Garrett Ferrari MD  Date:    03/16/2025  Time:    11:29               Narrative:    EXAMINATION:  XR CHEST AP PORTABLE    CLINICAL HISTORY:  Cough, unspecified    TECHNIQUE:  Single frontal view of the chest was performed.    COMPARISON:  05/29/2018    FINDINGS:  Cardiac silhouette and mediastinal contours normal for portable technique.  There is mild perihilar interstitial prominence with bibasilar interstitial opacity greater at the left lung base.                                       Medications   oseltamivir capsule 75 mg (has no administration in time range)   sodium chloride 0.9% flush 10 mL (has no administration in time range)   melatonin tablet 6 mg (has no administration in time range)   ondansetron disintegrating tablet 4 mg (has no administration in time range)   ondansetron injection 4 mg (has no administration in time range)   acetaminophen tablet 650 mg (has no administration in time range)   metoprolol succinate (TOPROL-XL) 24 hr tablet 25 mg (has no administration in time range)   atorvastatin tablet 10 mg (has no administration in time range)   tamsulosin 24 hr capsule 0.4 mg (has no administration in time range)   verapamiL CR tablet 240 mg (has no administration in time range)   ALPRAZolam tablet 0.25 mg (has no administration in time range)   gabapentin capsule 300 mg (has no administration in time range)   rivaroxaban tablet 20 mg (20 mg Oral Given 3/16/25 1825)   dextromethorphan-guaiFENesin  mg/5 ml liquid 10 mL (has no administration in time range)   albuterol-ipratropium 2.5 mg-0.5 mg/3 mL nebulizer solution 3 mL (has no administration in time range)   metoprolol injection 5 mg (has no administration in time range)   benzonatate capsule 200 mg (has no administration in time range)   lactated ringers bolus 500 mL (0 mLs Intravenous Stopped 3/16/25 1010)   acetaminophen tablet 1,000 mg (1,000 mg Oral  Given 3/16/25 1036)   metoprolol injection 5 mg (5 mg Intravenous Given 3/16/25 1330)   oseltamivir capsule 75 mg (75 mg Oral Given 3/16/25 1330)   albuterol-ipratropium 2.5 mg-0.5 mg/3 mL nebulizer solution 3 mL (3 mLs Nebulization Given 3/16/25 1341)     Medical Decision Making  Ddx- covid, influenza, pneumonia, pneumothorax, hypoxia    Flu+  Afib rvr.   Hypoxic.  Discussed with patient and wife several times my concerns for rapid discharge in current condition.  Agreeable to staying in hospital and undergoing treatment.     Problems Addressed:  Cough: acute illness or injury  Hypoxia: acute illness or injury  Influenza A: acute illness or injury  Weakness: acute illness or injury    Amount and/or Complexity of Data Reviewed  Independent Historian: spouse     Details: Notes he had to crawl today as a result of his weakness  External Data Reviewed: labs and notes.     Details: Hx of bph and afib  Labs: ordered. Decision-making details documented in ED Course.  Radiology: ordered and independent interpretation performed. Decision-making details documented in ED Course.  ECG/medicine tests: ordered and independent interpretation performed. Decision-making details documented in ED Course.    Risk  OTC drugs.  Prescription drug management.  Parenteral controlled substances.  Decision regarding hospitalization.            Scribe Attestation:   Scribe #1: I performed the above scribed service and the documentation accurately describes the services I performed. I attest to the accuracy of the note.              Physician Attestation for Scribe: I, glendy lantigua, reviewed documentation as scribed in my presence, which is both accurate and complete.                   Clinical Impression:  Final diagnoses:  [R53.1] Weakness  [R05.9] Cough  [J10.1] Influenza A (Primary)  [R09.02] Hypoxia          ED Disposition Condition    Admit Stable                    [1]   Social History  Tobacco Use    Smoking status: Never     Smokeless tobacco: Never   Substance Use Topics    Alcohol use: Yes     Alcohol/week: 12.0 standard drinks of alcohol     Types: 5 Shots of liquor, 7 Standard drinks or equivalent per week     Comment: every other day    Drug use: No        Jean Marie Manzanares MD  03/16/25 8980

## 2025-03-16 NOTE — H&P
[facility] - Emergency Dept  [facility] Medicine  History & Physical    Patient Name: Oliver Burk Jr.  MRN: [id number]  Patient Class: IP- Inpatient  Admission Date: 3/16/2025  Attending Physician: SHANTHI [facility] MD  Primary Care Provider: Maria Isabel Corral MD    Patient information was obtained from patient, spouse/SO, past medical records, and ER records.     Subjective:     Principal Problem:Influenza A    Chief Complaint:   Chief Complaint   Patient presents with    Fatigue     Weakness, congestion, cough x2 days. RA SpO2 93%, improved to 98% on 3L NC. A-fib on monitor. No other medical hx.        HPI: Mr. Burk is an 84/M with PMH HTN, HLD, permanent A-fib (on rivaroxaban), ROMEO (unable to tolerate CPAP), BPH, spinal stenosis with neuropathy who presented to [facility] 03/16 with a three day history of fatigue, fevers, chills, and weakness. He notes a sick contact at work and temperatures of 100.2 and 100.5F at home, though no significant chills or diaphoresis. He attempted acetaminophen for management at home. He notes predominantly nonproductive cough with congestion in addition. He notes falling twice at home due to weakness and knee discomfort and difficulty with his gait, but denies any injuries from his falls. With persistent symptoms he presented to ED for further evaluation. ED workup was notable for hypoxia which improved on 3L O2 to 91-98%, atrial fibrillation with RVR, + influenza A, CXR with bilateral infiltrates, and mild hyponatremia on metabolic panel. He received oseltamivir, albuterol-ipratropium nebulization, and metoprolol 5mg IV x1 with improvement in HR to baseline . Hospital medicine was subsequently contacted for admission.    Past Medical History:   Diagnosis Date    Arthritis     Atrial fibrillation     Basal cell carcinoma     Cancer     prostate    Hyperlipidemia     Hypertension     on medication    SCC (squamous cell carcinoma) 01/29/2025    L posterior forearm distal        Past Surgical History:   Procedure Laterality Date    APPENDECTOMY      CATARACT EXTRACTION Right 01/14/2019    Dr. Mendez (symfony lens)    CATARACT EXTRACTION W/  INTRAOCULAR LENS IMPLANT Right 01/14/2019    Procedure: EXTRACTION, CATARACT, WITH IOL INSERTION;  Surgeon: Mu Mendez MD;  Location: [facility] OR;  Service: Ophthalmology;  Laterality: Right;  Laser    CATARACT EXTRACTION W/  INTRAOCULAR LENS IMPLANT Left 01/28/2019    Procedure: EXTRACTION, CATARACT, WITH IOL INSERTION;  Surgeon: Mu Mendez MD;  Location: [facility] OR;  Service: Ophthalmology;  Laterality: Left;  Laser    EYE SURGERY      LUMBAR LAMINECTOMY  2004 2019 dr pike    PROSTATE SURGERY  2010    prostate cancer - radiation seeds- low grade    REPAIR, HERNIA, INGUINAL, WITHOUT HISTORY OF PRIOR REPAIR, AGE 7 YEARS OR OLDER Left 02/24/2023    Procedure: REPAIR, HERNIA, INGUINAL, WITHOUT HISTORY OF PRIOR REPAIR, AGE 5  YEARS OR OLDER (WITH MESH);  Surgeon: Richar Stewart MD;  [address];  Service: General;  Laterality: Left;    TREATMENT OF CARDIAC ARRHYTHMIA N/A 10/17/2022    Procedure: CARDIOVERSION;  Surgeon: ABDULLAHI Spangler MD;  Location: [facility] EP LAB;  Service: Cardiology;  Laterality: N/A;  afib, RAINER, DCCV, anes, DM, 3 Prep       Review of patient's allergies indicates:  No Known Allergies    No current facility-administered medications on file prior to encounter.     Current Outpatient Medications on File Prior to Encounter   Medication Sig    ALPRAZolam (XANAX) 0.25 MG tablet Take 1 tablet (0.25 mg total) by mouth 2 (two) times daily as needed for Anxiety. Take one tablet hs for sleep    ascorbic acid, vitamin C, (VITAMIN C) 250 MG tablet Take 250 mg by mouth once daily.    azelastine (ASTELIN) 137 mcg (0.1 %) nasal spray 1 spray (137 mcg total) by Nasal route 2 (two) times daily. (Patient not taking: Reported on 9/25/2024)    calcium citrate (CALCITRATE) 200 mg (950 mg) tablet Take 2 tablets by mouth once  daily.    diclofenac sodium (VOLTAREN) 1 % Gel Apply 1 Tube topically 2 (two) times daily as needed.    gabapentin (NEURONTIN) 300 MG capsule TAKE 1 CAPSULE BY MOUTH 3 TIMES DAILY AS NEEDED    Lactobac no.41/Bifidobact no.7 (PROBIOTIC-10 ORAL) Take by mouth.    metoprolol succinate (TOPROL-XL) 25 MG 24 hr tablet Take 1 tablet (25 mg total) by mouth 2 (two) times daily.    simvastatin (ZOCOR) 20 MG tablet Take 1 tablet (20 mg total) by mouth every evening.    suvorexant (BELSOMRA) 15 mg Tab Take 1 tablet by mouth nightly as needed.    tamsulosin (FLOMAX) 0.4 mg Cap Take 1 capsule (0.4 mg total) by mouth every evening.    verapamiL (CALAN-SR) 240 MG CR tablet Take 1 tablet (240 mg total) by mouth once daily.    vitamin D (VITAMIN D3) 1000 units Tab Take 1,000 Units by mouth once daily.    XARELTO 20 mg Tab TAKE 1 TABLET BY MOUTH EVERY DAY WITH DINNER OR EVENING MEAL     Family History       Problem Relation (Age of Onset)    COPD Mother    Cancer Father    Heart disease Father    Heart failure Father    Osteoporosis Mother          Tobacco Use    Smoking status: Never    Smokeless tobacco: Never   Substance and Sexual Activity    Alcohol use: Yes     Alcohol/week: 12.0 standard drinks of alcohol     Types: 5 Shots of liquor, 7 Standard drinks or equivalent per week     Comment: every other day    Drug use: No    Sexual activity: Yes     Partners: Female     Review of Systems   Constitutional:  Positive for fatigue and fever. Negative for chills.   HENT:  Negative for sore throat and trouble swallowing.    Eyes:  Negative for pain and visual disturbance.   Respiratory:  Negative for cough and shortness of breath.    Cardiovascular:  Negative for chest pain and palpitations.   Gastrointestinal:  Negative for abdominal pain, nausea and vomiting.   Genitourinary:  Negative for difficulty urinating and dysuria.   Musculoskeletal:  Positive for gait problem. Negative for arthralgias and joint swelling.   Skin:  Negative for  rash and wound.   Neurological:  Negative for weakness and numbness.     Objective:     Vital Signs (Most Recent):  Temp: 99.6 °F (37.6 °C) (03/16/25 0938)  Pulse: 89 (03/16/25 1652)  Resp: 20 (03/16/25 1652)  BP: 138/86 (03/16/25 1400)  SpO2: 98 % (03/16/25 1652) Vital Signs (24h Range):  Temp:  [99.6 °F (37.6 °C)] 99.6 °F (37.6 °C)  Pulse:  [] 89  Resp:  [14-25] 20  SpO2:  [91 %-98 %] 98 %  BP: (110-145)/(62-86) 138/86     Weight: 82.6 kg (182 lb)  Body mass index is 25.38 kg/m².     Physical Exam  Vitals and nursing note reviewed.   Constitutional:       General: He is sleeping. He is not in acute distress.     Appearance: He is well-developed.   HENT:      Head: Normocephalic and atraumatic.   Eyes:      General:         Right eye: No discharge.         Left eye: No discharge.      Conjunctiva/sclera: Conjunctivae normal.   Cardiovascular:      Rate and Rhythm: Normal rate. Rhythm irregularly irregular.      Pulses: Normal pulses.   Pulmonary:      Effort: Pulmonary effort is normal. No respiratory distress.      Comments: Diminished bilateral bases. Faint expiratory wheezes.  Abdominal:      Palpations: Abdomen is soft.      Tenderness: There is no abdominal tenderness.   Musculoskeletal:         General: Normal range of motion.      Right lower leg: Edema (trace) present.      Left lower leg: Edema (trace) present.   Skin:     General: Skin is warm and dry.   Neurological:      Mental Status: He is oriented to person, place, and time and easily aroused.        Significant Labs:   CBC:  Recent Labs   Lab 03/16/25  1003   WBC 7.14   HGB 12.2*   HCT 35.7*   *   GRAN 65.7  4.7   LYMPH 13.3*  1.0   MONO 20.4*  1.5*   EOS 0.0   BASO 0.02   CMP:  Recent Labs   Lab 03/16/25  1003   *   K 4.1      CO2 22*   BUN 13   CREATININE 0.8   *   CALCIUM 9.3   ALKPHOS 76   AST 31   ALT 24   BILITOT 1.6*   PROT 6.5   ALBUMIN 3.4*   ANIONGAP 9      Significant Imaging: I have reviewed and  interpreted all pertinent imaging results/findings within the past 24 hours.  Imaging Results              X-Ray Chest AP Portable (Final result)  Result time 03/16/25 11:29:16      Final result by Garrett Ferrari MD (03/16/25 11:29:16)                   Impression:      Bilateral perihilar and lower lung interstitial findings.  Correlate for non interstitial pneumonitis or early interstitial edema.      Electronically signed by: Garrett Ferrari MD  Date:    03/16/2025  Time:    11:29               Narrative:    EXAMINATION:  XR CHEST AP PORTABLE    CLINICAL HISTORY:  Cough, unspecified    TECHNIQUE:  Single frontal view of the chest was performed.    COMPARISON:  05/29/2018    FINDINGS:  Cardiac silhouette and mediastinal contours normal for portable technique.  There is mild perihilar interstitial prominence with bibasilar interstitial opacity greater at the left lung base.                                    Assessment/Plan:     * Influenza A  - Acute hypoxemic respiratory failure in setting of influenza A requiring 3L O2 via NC.  - Continue oseltamivir 75mg PO BID, albuterol-ipratropium 2.5-0.5mg inhaled q4hr PRN. Start incentive spirometry, flutter valve treatments.  - Start guaifenesin-dextromethorphan 10mL PO q4hr PRN, benzonatate 200mg PO TID PRN cough.  - Wean supplemental O2 as tolerated.  - Atrial fibrillation at baseline managed with metoprolol 25mg PO BID and verapamil 250mg PO qHS; likely RVR in response to acute illness with influenza. Responded well to metoprolol 5mg IV. Continue as 5mg IV q4hr PRN for sustained HR > 130.  - PT/OT evaluations given significant weakness.    Insomnia  - Unable to tolerate CPAP outpatient. Supplemental O2 overnight as required.  - Continue alprazolam 0.25mg PO BID PRN anxiety/insomnia. If persistent issues, obtain suvorexant from home to use as non-formulary.    BPH (benign prostatic hyperplasia)  - Continue tamsulosin 0.4mg PO daily.    Aortic atherosclerosis  -  Continue statin with atorvastatin 10mg PO qHS.      VTE Risk Mitigation (From admission, onward)           Ordered     rivaroxaban tablet 20 mg  with dinner         03/16/25 1546     IP VTE HIGH RISK PATIENT  Once         03/16/25 1438     Reason for No Pharmacological VTE Prophylaxis  Once        Question:  Reasons:  Answer:  Already adequately anticoagulated on oral Anticoagulants    03/16/25 1438                       SHANTHI [facility] MD  Department of Hospital Medicine  [facility] - Emergency Dept

## 2025-03-17 ENCOUNTER — RESULTS FOLLOW-UP (OUTPATIENT)
Dept: INTERNAL MEDICINE | Facility: CLINIC | Age: 85
End: 2025-03-17

## 2025-03-17 VITALS
HEIGHT: 71 IN | DIASTOLIC BLOOD PRESSURE: 64 MMHG | SYSTOLIC BLOOD PRESSURE: 124 MMHG | RESPIRATION RATE: 18 BRPM | TEMPERATURE: 99 F | OXYGEN SATURATION: 91 % | BODY MASS INDEX: 25.5 KG/M2 | WEIGHT: 182.13 LBS | HEART RATE: 90 BPM

## 2025-03-17 LAB
ANION GAP SERPL CALC-SCNC: 12 MMOL/L (ref 8–16)
BASOPHILS # BLD AUTO: 0.03 K/UL (ref 0–0.2)
BASOPHILS NFR BLD: 0.4 % (ref 0–1.9)
BUN SERPL-MCNC: 13 MG/DL (ref 8–23)
CALCIUM SERPL-MCNC: 9.4 MG/DL (ref 8.7–10.5)
CHLORIDE SERPL-SCNC: 103 MMOL/L (ref 95–110)
CO2 SERPL-SCNC: 21 MMOL/L (ref 23–29)
CREAT SERPL-MCNC: 0.7 MG/DL (ref 0.5–1.4)
DIFFERENTIAL METHOD BLD: ABNORMAL
EOSINOPHIL # BLD AUTO: 0 K/UL (ref 0–0.5)
EOSINOPHIL NFR BLD: 0.3 % (ref 0–8)
ERYTHROCYTE [DISTWIDTH] IN BLOOD BY AUTOMATED COUNT: 12.7 % (ref 11.5–14.5)
EST. GFR  (NO RACE VARIABLE): >60 ML/MIN/1.73 M^2
GLUCOSE SERPL-MCNC: 104 MG/DL (ref 70–110)
HCT VFR BLD AUTO: 39.8 % (ref 40–54)
HGB BLD-MCNC: 12.7 G/DL (ref 14–18)
IMM GRANULOCYTES # BLD AUTO: 0.03 K/UL (ref 0–0.04)
IMM GRANULOCYTES NFR BLD AUTO: 0.4 % (ref 0–0.5)
LYMPHOCYTES # BLD AUTO: 1.2 K/UL (ref 1–4.8)
LYMPHOCYTES NFR BLD: 17.4 % (ref 18–48)
MAGNESIUM SERPL-MCNC: 2 MG/DL (ref 1.6–2.6)
MCH RBC QN AUTO: 32.9 PG (ref 27–31)
MCHC RBC AUTO-ENTMCNC: 31.9 G/DL (ref 32–36)
MCV RBC AUTO: 103 FL (ref 82–98)
MONOCYTES # BLD AUTO: 1.1 K/UL (ref 0.3–1)
MONOCYTES NFR BLD: 16.9 % (ref 4–15)
NEUTROPHILS # BLD AUTO: 4.3 K/UL (ref 1.8–7.7)
NEUTROPHILS NFR BLD: 64.6 % (ref 38–73)
NRBC BLD-RTO: 0 /100 WBC
OHS QRS DURATION: 88 MS
OHS QTC CALCULATION: 459 MS
PHOSPHATE SERPL-MCNC: 3.1 MG/DL (ref 2.7–4.5)
PLATELET # BLD AUTO: 131 K/UL (ref 150–450)
PMV BLD AUTO: 11.6 FL (ref 9.2–12.9)
POTASSIUM SERPL-SCNC: 4.2 MMOL/L (ref 3.5–5.1)
RBC # BLD AUTO: 3.86 M/UL (ref 4.6–6.2)
SODIUM SERPL-SCNC: 136 MMOL/L (ref 136–145)
WBC # BLD AUTO: 6.73 K/UL (ref 3.9–12.7)

## 2025-03-17 PROCEDURE — 84100 ASSAY OF PHOSPHORUS: CPT | Performed by: INTERNAL MEDICINE

## 2025-03-17 PROCEDURE — 85025 COMPLETE CBC W/AUTO DIFF WBC: CPT | Performed by: INTERNAL MEDICINE

## 2025-03-17 PROCEDURE — 80048 BASIC METABOLIC PNL TOTAL CA: CPT | Performed by: INTERNAL MEDICINE

## 2025-03-17 PROCEDURE — 97165 OT EVAL LOW COMPLEX 30 MIN: CPT

## 2025-03-17 PROCEDURE — 83735 ASSAY OF MAGNESIUM: CPT | Performed by: INTERNAL MEDICINE

## 2025-03-17 PROCEDURE — 97535 SELF CARE MNGMENT TRAINING: CPT

## 2025-03-17 PROCEDURE — 97162 PT EVAL MOD COMPLEX 30 MIN: CPT

## 2025-03-17 PROCEDURE — 97116 GAIT TRAINING THERAPY: CPT

## 2025-03-17 PROCEDURE — 36415 COLL VENOUS BLD VENIPUNCTURE: CPT | Performed by: INTERNAL MEDICINE

## 2025-03-17 PROCEDURE — 25000003 PHARM REV CODE 250: Performed by: INTERNAL MEDICINE

## 2025-03-17 RX ORDER — DEXTROMETHORPHAN HBR AND GUAIFENESIN 5; 100 MG/5ML; MG/5ML
10 LIQUID ORAL EVERY 4 HOURS PRN
COMMUNITY
Start: 2025-03-17 | End: 2025-03-27

## 2025-03-17 RX ORDER — OSELTAMIVIR PHOSPHATE 75 MG/1
75 CAPSULE ORAL 2 TIMES DAILY
Qty: 8 CAPSULE | Refills: 0 | Status: SHIPPED | OUTPATIENT
Start: 2025-03-17 | End: 2025-03-21

## 2025-03-17 RX ADMIN — GABAPENTIN 300 MG: 300 CAPSULE ORAL at 08:03

## 2025-03-17 RX ADMIN — METOPROLOL SUCCINATE 25 MG: 25 TABLET, EXTENDED RELEASE ORAL at 08:03

## 2025-03-17 RX ADMIN — OSELTAMIVIR PHOSPHATE 75 MG: 75 CAPSULE ORAL at 08:03

## 2025-03-17 RX ADMIN — MELATONIN TAB 3 MG 6 MG: 3 TAB at 12:03

## 2025-03-17 NOTE — ASSESSMENT & PLAN NOTE
- Acute hypoxemic respiratory failure in setting of influenza A requiring 3L O2 via NC.  - Continue oseltamivir 75mg PO BID, albuterol-ipratropium 2.5-0.5mg inhaled q4hr PRN. Start incentive spirometry, flutter valve treatments.  - Start guaifenesin-dextromethorphan 10mL PO q4hr PRN, benzonatate 200mg PO TID PRN cough.  - Wean supplemental O2 as tolerated.  - Atrial fibrillation at baseline managed with metoprolol 25mg PO BID and verapamil 250mg PO qHS; likely RVR in response to acute illness with influenza. Responded well to metoprolol 5mg IV. Continue as 5mg IV q4hr PRN for sustained HR > 130.  - PT/OT evaluations given significant weakness.  Patient has  atrial fibrillation. Patient is currently in . [id number] Score: 2. The patients heart rate in the last 24 hours is as follows:  Pulse  Min: 65  Max: 124     Antiarrhythmics  metoprolol succinate (TOPROL-XL) 24 hr tablet 25 mg, 2 times daily, Oral  metoprolol injection 5 mg, Every 4 hours PRN, Intravenous    Anticoagulants  rivaroxaban tablet 20 mg, with dinner, Oral    Plan  - Replete lytes with a goal of K>4, Mg >2  -   - Patient's afib is currently   - ***      Patient with  Respiratory failure which is .  he is . Supplemental oxygen was provided and noted-      .   Signs/symptoms of respiratory failure include- . Contributing diagnoses includes -  Labs and images were reviewed. Patient . Will treat underlying causes and adjust management of respiratory failure as follows- ***

## 2025-03-17 NOTE — ASSESSMENT & PLAN NOTE
- Acute hypoxemic respiratory failure in setting of influenza A requiring 3L O2 via NC.  - Continue oseltamivir 75mg PO BID, albuterol-ipratropium 2.5-0.5mg inhaled q4hr PRN. Start incentive spirometry, flutter valve treatments.  - Start guaifenesin-dextromethorphan 10mL PO q4hr PRN, benzonatate 200mg PO TID PRN cough.  - Wean supplemental O2 as tolerated.  - Atrial fibrillation at baseline managed with metoprolol 25mg PO BID and verapamil 250mg PO qHS; likely RVR in response to acute illness with influenza. Responded well to metoprolol 5mg IV. Continue as 5mg IV q4hr PRN for sustained HR > 130.  - PT/OT evaluations given significant weakness.  Patient has  atrial fibrillation. Patient is currently in . [id number] Score: 2. The patients heart rate in the last 24 hours is as follows:  Pulse  Min: 65  Max: 124     Antiarrhythmics  metoprolol succinate (TOPROL-XL) 24 hr tablet 25 mg, 2 times daily, Oral  metoprolol injection 5 mg, Every 4 hours PRN, Intravenous    Anticoagulants  rivaroxaban tablet 20 mg, with dinner, Oral    Plan  - Replete lytes with a goal of K>4, Mg >2  -   - Patient's afib is currently   - ***      Patient with  Respiratory failure which is .  he is . Supplemental oxygen was provided and noted-      .   Signs/symptoms of respiratory failure include- . Contributing diagnoses includes -  Labs and images were reviewed. Patient . Will treat underlying causes and adjust management of respiratory failure as follows- ***

## 2025-03-17 NOTE — NURSING
03/17/2025      Pt verbalized understanding D/C instructions and education provided pertinent to D/C needs. IV cath removed fully intact. No distress noted. Pt awaiting transport for discharge.              Marisela Coughlin RN

## 2025-03-17 NOTE — PLAN OF CARE
Case Management Final Discharge Note    Discharge Disposition: Home    New DME ordered / company name: None (stated he has RW at home)    Relevant SDOH / Transition of Care Barriers:  None    Person available to provide assistance at home when needed and their contact information: Self    Scheduled followup appointment: Message left with PCP office for needed seven day hospital follow up appt    Referrals placed: Outpatient PT/OT    Transportation: Family     03/17/25 1216   Final Note   Assessment Type Final Discharge Note   Anticipated Discharge Disposition Home   Hospital Resources/Appts/Education Provided Provided patient/caregiver with written discharge plan information   Post-Acute Status   Discharge Delays None known at this time     [facility] ([address])  Discharge Final Note    Primary Care Provider: Maria Isabel Corral MD    Expected Discharge Date: 3/17/2025    Final Discharge Note (most recent)       Final Note - 03/17/25 1216          Final Note    Assessment Type Final Discharge Note (P)      Anticipated Discharge Disposition Home or Self Care (P)      Hospital Resources/Appts/Education Provided Provided patient/caregiver with written discharge plan information (P)         Post-Acute Status    Discharge Delays None known at this time (P)                      Important Message from Medicare  Important Message from Medicare regarding Discharge Appeal Rights: Explained to patient/caregiver, Signed/date by patient/caregiver     Date IMM was signed: 03/16/25  Time IMM was signed: [id number]    Contact Info       Maria Isabel Corral MD   Specialty: Internal Medicine   Relationship: PCP - General    [facility] [address]  [facility] [address]   Phone: [phone]       Next Steps: Follow up in 2 week(s)    Instructions: post-hospital follow-up    Clinic will notify you with follow up appt

## 2025-03-17 NOTE — PLAN OF CARE
D/C Rec: LIT  DME Rec: RW     Pt evaluated this date. PT below baseline PLOF. Pt previously using straight cane for longer distances. Pt ambulated 10' in room w RW and CGA. With straight cane, pt required Min A for balance and ambulated 6'. At this time, pt would benefit from RW as assistive device at home to improve safety until pt's strength and balance returns to baseline. Would benefit from acute PT services to increase functional independence back to towards baseline while pt admitted.     Problem: Physical Therapy  Goal: Physical Therapy Goal  Description: P.T goals to be met in 2 weeks as follows:  1. independent Bed Mobility  2. Bed<>chair independent  3. Tolerate OOB x 4 hours  4. Gait 250' w MOD I and LRAD    Outcome: Progressing

## 2025-03-17 NOTE — PLAN OF CARE
Problem: Occupational Therapy  Goal: Occupational Therapy Goal  Description: Goals to be met by: 3/22/2025     Patient will increase functional independence with ADLs by performing:    UE Dressing with [place].  LE Dressing with Supervision.  Grooming while standing at sink with Modified [place].  Toileting from toilet with Modified [place] for hygiene and clothing management.   Toilet transfer to toilet with Modified [place].    Outcome: Progressing     OT evaluation complete and POC established.  Low intensity therapy is recommended upon d/c from acute care to further address deficits and help pt improve overall functional independence.     WILTON Bullard  3/17/2025

## 2025-03-17 NOTE — PLAN OF CARE
Medicare Message     Important Message from Medicare regarding Discharge Appeal Rights Explained to patient/caregiver; Signed/date by patient/caregiver   Date IMM was signed 3/16/2025   Time IMM was signed [id number]

## 2025-03-17 NOTE — PT/OT/SLP EVAL
Describe your symptoms: Diarrhea, pain with cough.  Any pain: yes with cough   New/Ongoing: Ongoing  How long have you been having symptoms: 7/23/19  week(s)  Have you been seen for this:  Yes.  Have your symptoms changed since this visit? No  Triage offered?: N/A   Home remedies tried: NO  Pharmacy Name and Location: [facility]  Okay to leave a detailed message? No  Patients wife states that they went ER yesterday (8/6/19) they din't do anything. Caller is requesting call from provider to discuss directly.   Occupational Therapy   Evaluation & Treatment    Name: Oliver Burk Jr.  MRN: [id number]  Admitting Diagnosis: Influenza A  Recent Surgery: * No surgery found *      Recommendations:     Discharge Recommendations: Low Intensity Therapy  Discharge Equipment Recommendations:  walker, rolling  Barriers to discharge:  None    Assessment:     Oliver Burk Jr. is a 84 y.o. male with a medical diagnosis of Influenza A.  He presents with no pain. Performance deficits affecting function: weakness, impaired endurance, impaired self care skills, impaired functional mobility, gait instability, impaired balance, decreased safety awareness, decreased coordination.  Pt agreeable to participating in therapy upon arrival to room.  Pt demonstrates strength and ROM in (B) UE needed for ADLs that is WFL.  Pt able to perform grooming task standing at sink with SBA.  Impaired balance and decreased coordination observed while ambulating with SPC and CGA-SBA.  While ambulating with RW improved balance noted with education provided regarding benefits of using RW vs SPC.    Pt tolerated therapy well.  PTA pt reports being independent with ADLs and was using a SPC for mobility mostly for longer distances.  Pt would benefit from skilled OT services to address problems listed above and increase independence with ADLs.  Low intensity therapy is recommended upon d/c from acute care to further address deficits and help pt improve overall functional independence.           Rehab Prognosis: Good; patient would benefit from acute skilled OT services to address these deficits and reach maximum level of function.       Plan:     Patient to be seen 3 x/week to address the above listed problems via self-care/home management, therapeutic activities, therapeutic exercises  Plan of Care Expires: 03/31/25  Plan of Care Reviewed with: patient, friend    Subjective     *Friend states he has a walker he can give to pt from his wife's knee  surgeries    Chief Complaint: ready to go home  Patient/Family Comments/goals: return home    Occupational Profile:  Living Environment: Pt lives wit hwrasheed in [facility], [address].  Bathroom has walk-in shower with small step.   Previous level of function:   ADLs: Independent  Mobility: Mod I with SPC  Mostly using for longer distances  Falls: Reports recent falls  Roles and Routines: , friend,   Equipment Used at Home: cane, straight  Assistance upon Discharge: wife able to provide assist    Pain/Comfort:  Pain Rating 1: 0/10  Pain Rating Post-Intervention 1: 0/10    Patients cultural, spiritual, Worship conflicts given the current situation: no    Objective:     Communicated with: RN (Melany) prior to session.  Patient found HOB elevated with telemetry, peripheral IV upon OT entry to room.  *Friend (is and MD- used to work at [employer]) present during session    General Precautions: Standard, fall, droplet  Orthopedic Precautions: N/A  Braces: N/A  Respiratory Status: Room air    Occupational Performance:    Bed Mobility:    Supine <> sit: supervision  Scooting: supervision  Sit <> supine: supervision    Functional Mobility/Transfers:  Sit <> stand: SBA, SPC x 1 trial from EOB  Functional Mobility:   Pt ambulated ~12 ft with SPC, CGA-SBA.  Decreased balance and coordination observed  Pt then ambulated ~12 ft with SBA, RW.  Improved balance noted as compared to first trial with SPC    Activities of Daily Living:  Grooming: stand by assistance for washing hands while standing at sink.  Lower body dressing: SBA for donning socks while seated at EOB.    Cognitive/Visual Perceptual:  Cognitive/Psychosocial Skills:    -       Oriented to: Person, Place, Time, and Situation   -       Follows Commands/attention: Follows 100% of one step commands  -       Communication: clear/fluent  -       Memory: No Deficits noted  -       Safety awareness/insight to disability: impaired with mobility  -       Mood/Affect/Coping  skills/emotional control: Cooperative and Pleasant    Physical Exam:  Postural examination/scapula alignment:   -       Rounded shoulders  -       Forward head  Skin integrity: intact  Edema:  none  Sensation: intact  Motor Planning: WFL  Dominant hand: right  Upper Extremity Range of Motion: As observed through functional tasks  -       Right Upper Extremity: WFL  -       Left Upper Extremity: WFL  Upper Extremity Strength: As observed through functional tasks  -       Right Upper Extremity: WFL  -       Left Upper Extremity: WFL   Strength: WFL  Fine Motor Coordination: Intact  Gross motor coordination: WFL  Balance: Sitting- Independent; Standing- CGA-SBA    AMPAC 6 Click ADL:  AMPAC Total Score: 20    Treatment & Education:  *Session focused on promoting increased endurance, posture, strength, balance, coordination, and ROM needed for ADLs and functional transfers as part of daily routine.   -pt educated on role of OT in acute care setting  -pt educated on benefits of utilizing a RW as opposed to a SPC  -pt performed sit <> stand transfer from EOB; cues for technique provided  -pt ambulated within room to address coordination, endurance, and balance needed for functional mobility; cues for RW management provided  -POC reviewed with pt and friend          Patient left HOB elevated with all lines intact, call button in reach, and friend present    GOALS:   Multidisciplinary Problems       Occupational Therapy Goals          Problem: Occupational Therapy    Goal Priority Disciplines Outcome Interventions   Occupational Therapy Goal     OT, PT/OT Progressing    Description: Goals to be met by: 3/22/2025     Patient will increase functional independence with ADLs by performing:    UE Dressing with [place].  LE Dressing with Supervision.  Grooming while standing at sink with Modified [place].  Toileting from toilet with Modified [place] for hygiene and clothing management.   Toilet transfer to  toilet with Modified [place].                         DME Justifications:   Oliver's mobility limitation cannot be sufficiently resolved by the use of a cane. His functional mobility deficit can be sufficiently resolved with the use of a Rolling Walker. Patient's mobility limitation significantly impairs their ability to participate in one of more activities of daily living.  The use of a RW will significantly improve the patient's ability to participate in MRADLS and the patient will use it on regular basis in the home.    History:     Past Medical History:   Diagnosis Date    Arthritis     Atrial fibrillation     Basal cell carcinoma     Cancer     prostate    Hyperlipidemia     Hypertension     on medication    SCC (squamous cell carcinoma) 01/29/2025    L posterior forearm distal         Past Surgical History:   Procedure Laterality Date    APPENDECTOMY      CATARACT EXTRACTION Right 01/14/2019    Dr. Mendez (symfony lens)    CATARACT EXTRACTION W/  INTRAOCULAR LENS IMPLANT Right 01/14/2019    Procedure: EXTRACTION, CATARACT, WITH IOL INSERTION;  Surgeon: Mu Mendez MD;  Location: [facility];  Service: Ophthalmology;  Laterality: Right;  Laser    CATARACT EXTRACTION W/  INTRAOCULAR LENS IMPLANT Left 01/28/2019    Procedure: EXTRACTION, CATARACT, WITH IOL INSERTION;  Surgeon: Mu Mendez MD;  Location: [facility];  Service: Ophthalmology;  Laterality: Left;  Laser    EYE SURGERY      LUMBAR LAMINECTOMY  2004 2019 dr pike    PROSTATE SURGERY  2010    prostate cancer - radiation seeds- low grade    REPAIR, HERNIA, INGUINAL, WITHOUT HISTORY OF PRIOR REPAIR, AGE 7 YEARS OR OLDER Left 02/24/2023    Procedure: REPAIR, HERNIA, INGUINAL, WITHOUT HISTORY OF PRIOR REPAIR, AGE 5  YEARS OR OLDER (WITH MESH);  Surgeon: Richar Stewart MD;  Location: [address];  Service: General;  Laterality: Left;    TREATMENT OF CARDIAC ARRHYTHMIA N/A 10/17/2022    Procedure: CARDIOVERSION;  Surgeon: ABDULLAHI Spangler,  MD;  Location: [facility] EP LAB;  Service: Cardiology;  Laterality: N/A;  afib, RAINER, DCCV, anes, DM, 3 Prep       Time Tracking:     OT Date of Treatment: 03/17/25  OT Start Time: 1043  OT Stop Time: 1100  OT Total Time (min): 17 min    Billable Minutes:Evaluation 9  Self Care/Home Management 8    WILTON Bullard  3/17/2025

## 2025-03-17 NOTE — ASSESSMENT & PLAN NOTE
Patient's blood pressure range in the last 24 hours was: BP  Min: 110/63  Max: 152/78.The patient's inpatient anti-hypertensive regimen is listed below:  Current Antihypertensives  metoprolol succinate (TOPROL-XL) 24 hr tablet 25 mg, 2 times daily, Oral  verapamiL CR tablet 240 mg, Nightly, Oral  metoprolol injection 5 mg, Every 4 hours PRN, Intravenous    Plan  -   - ***  - Continue statin with atorvastatin 10mg PO qHS.

## 2025-03-17 NOTE — ASSESSMENT & PLAN NOTE
Patient with  debility due to . The patient's latest AMPAC (Activity Measure for Post Acute Care) Score is listed below.    AM-PAC Score - How much help does the patient need for each activity listed  Basic Mobility Total Score: 17  Turning over in bed (including adjusting bedclothes, sheets and blankets)?: A lot  Sitting down on and standing up from a chair with arms (e.g., wheelchair, bedside commode, etc.): A little  Moving from lying on back to sitting on the side of the bed?: A lot  Moving to and from a bed to a chair (including a wheelchair)?: A little  Need to walk in hospital room?: A little  Climbing 3-5 steps with a railing?: None    Plan    - ***

## 2025-03-17 NOTE — PT/OT/SLP EVAL
Physical Therapy Evaluation    Patient Name:  Oliver Burk Jr.   MRN:  [id number]    Recommendations:     Discharge Recommendations: Low Intensity Therapy   Discharge Equipment Recommendations: walker, rolling   Barriers to discharge:  mobility limitations    Assessment:     Oliver Burk Jr. is a 84 y.o. male admitted with a medical diagnosis of Influenza A.  He presents with the following impairments/functional limitations: weakness, impaired endurance, gait instability, impaired functional mobility, impaired balance, decreased coordination. PT below baseline PLOF. Pt previously using straight cane for longer distances. Pt ambulated 10' in room w RW and CGA. With straight cane, pt required Min A for balance and ambulated 6'. At this time, pt would benefit from RW as assistive device at home to improve safety until pt's strength and balance returns to baseline. Would benefit from acute PT services to increase functional independence back to towards baseline while pt admitted. .    Rehab Prognosis: Good; patient would benefit from acute skilled PT services to address these deficits and reach maximum level of function.    Recent Surgery: * No surgery found *      Plan:     During this hospitalization, patient to be seen 4 x/week to address the identified rehab impairments via gait training, therapeutic activities, therapeutic exercises, neuromuscular re-education and progress toward the following goals:    Plan of Care Expires:  03/31/25    Subjective     Chief Complaint: pt reporting that he feels weak after being in the bed for days.   Patient/Family Comments/goals: pt agreeable to PT evaluation. Pt recommending LIT however pt deferring at this time.   Pain/Comfort:  Pain Rating 1: 0/10    Patients cultural, spiritual, Holiness conflicts given the current situation: no    Living Environment:  [facility] [address]. Pt is currently employed and works as a .   Prior to admission, patients level of function  was MOD I w straight cane used for longer distances.  Equipment used at home: cane, straight.  DME owned (not currently used): none.  Upon discharge, patient will have assistance from wife.    Objective:     Communicated with VARINDER lima prior to session.  Patient found supine with telemetry, oxygen (nasal cannula)  upon PT entry to room.    General Precautions: Standard,    Orthopedic Precautions:N/A   Braces: N/A  Respiratory Status: Nasal cannula, flow 2 L/min    Exams:  Sensation: -       Intact  RLE ROM: WFL  RLE Strength: 4+/5 MMT however pt w visible instability when ambulating w straight cane   LLE ROM: WFL  LLE Strength: 4+/5    Functional Mobility:  Bed Mobility:  Rolling Left:  stand by assistance  Scooting: stand by assistance  Supine to Sit: stand by assistance  Sit to Supine: independence  Transfers:  Sit to Stand:  contact guard assistance with rolling walker  Toilet Transfer: supervision for line management with  no AD  using  Stand Pivot  Gait: 10' w RW and CGA. 6' with straight cane and Min A for balance.       AM-PAC 6 CLICK MOBILITY  Total Score:20       Treatment & Education:  provided education on t/f training w RW, gait w RW, and overall safety w mobility     Patient left supine with all lines intact and call button in reach.    GOALS:   Multidisciplinary Problems       Physical Therapy Goals          Problem: Physical Therapy    Goal Priority Disciplines Outcome Interventions   Physical Therapy Goal     PT, PT/OT Progressing    Description: P.T goals to be met in 2 weeks as follows:  1. independent Bed Mobility  2. Bed<>chair independent  3. Tolerate OOB x 4 hours  4. Gait 250' w MOD I and LRAD                         DME Justifications:   Oliver's mobility limitation cannot be sufficiently resolved by the use of a cane. His functional mobility deficit can be sufficiently resolved with the use of a Rolling Walker. Patient's mobility limitation significantly impairs their ability to  participate in one of more activities of daily living.  The use of a RW will significantly improve the patient's ability to participate in MRADLS and the patient will use it on regular basis in the home.    History:     Past Medical History:   Diagnosis Date    Arthritis     Atrial fibrillation     Basal cell carcinoma     Cancer     prostate    Hyperlipidemia     Hypertension     on medication    SCC (squamous cell carcinoma) 01/29/2025    L posterior forearm distal       Past Surgical History:   Procedure Laterality Date    APPENDECTOMY      CATARACT EXTRACTION Right 01/14/2019    Dr. Mendez (symfony lens)    CATARACT EXTRACTION W/  INTRAOCULAR LENS IMPLANT Right 01/14/2019    Procedure: EXTRACTION, CATARACT, WITH IOL INSERTION;  Surgeon: Mu Mendez MD;  Location: [facility];  Service: Ophthalmology;  Laterality: Right;  Laser    CATARACT EXTRACTION W/  INTRAOCULAR LENS IMPLANT Left 01/28/2019    Procedure: EXTRACTION, CATARACT, WITH IOL INSERTION;  Surgeon: Mu Mendez MD;  Location: [facility];  Service: Ophthalmology;  Laterality: Left;  Laser    EYE SURGERY      LUMBAR LAMINECTOMY  2004 2019 dr pike    PROSTATE SURGERY  2010    prostate cancer - radiation seeds- low grade    REPAIR, HERNIA, INGUINAL, WITHOUT HISTORY OF PRIOR REPAIR, AGE 7 YEARS OR OLDER Left 02/24/2023    Procedure: REPAIR, HERNIA, INGUINAL, WITHOUT HISTORY OF PRIOR REPAIR, AGE 5  YEARS OR OLDER (WITH MESH);  Surgeon: Richar Stewart MD;  [address];  Service: General;  Laterality: Left;    TREATMENT OF CARDIAC ARRHYTHMIA N/A 10/17/2022    Procedure: CARDIOVERSION;  Surgeon: ABDULLAHI Spangler MD;  Location: [facility] EP LAB;  Service: Cardiology;  Laterality: N/A;  afib, RAINER, DCCV, anes, DM, 3 Prep       Time Tracking:     PT Received On: 03/17/25  PT Start Time: 0843     PT Stop Time: 0907  PT Total Time (min): 24 min     Billable Minutes: Evaluation 14 and Gait Training 10      03/17/2025

## 2025-03-17 NOTE — PLAN OF CARE
Patient is a new admit on shift . Patient came to the ED with complaints of fatigue , weakness , and congestion. The patient in on 2 liters of oxygen and is currently on tele with permeant AFIB. Bedside commode set up . Patient is on precautions for confirmed influenza . Patient is calm and rested .      Problem: Adult Inpatient Plan of Care  Goal: Plan of Care Review  Outcome: Progressing  Goal: Patient-Specific Goal (Individualized)  Outcome: Progressing  Goal: Absence of Hospital-Acquired Illness or Injury  Outcome: Progressing  Goal: Optimal Comfort and Wellbeing  Outcome: Progressing  Goal: Readiness for Transition of Care  Outcome: Progressing     Problem: Fall Injury Risk  Goal: Absence of Fall and Fall-Related Injury  Outcome: Progressing

## 2025-03-17 NOTE — ASSESSMENT & PLAN NOTE
- Acute hypoxemic respiratory failure in setting of influenza A requiring 3L O2 via NC.  - Continue oseltamivir 75mg PO BID, albuterol-ipratropium 2.5-0.5mg inhaled q4hr PRN. Start incentive spirometry, flutter valve treatments.  - Start guaifenesin-dextromethorphan 10mL PO q4hr PRN, benzonatate 200mg PO TID PRN cough.  - Wean supplemental O2 as tolerated.  - Atrial fibrillation at baseline managed with metoprolol 25mg PO BID and verapamil 250mg PO qHS; likely RVR in response to acute illness with influenza. Responded well to metoprolol 5mg IV. Continue as 5mg IV q4hr PRN for sustained HR > 130.  - PT/OT evaluations given significant weakness.  Patient has  atrial fibrillation. Patient is currently in . [id number] Score: 2. The patients heart rate in the last 24 hours is as follows:  Pulse  Min: 65  Max: 124     Antiarrhythmics  metoprolol succinate (TOPROL-XL) 24 hr tablet 25 mg, 2 times daily, Oral  metoprolol injection 5 mg, Every 4 hours PRN, Intravenous    Anticoagulants  rivaroxaban tablet 20 mg, with dinner, Oral    Plan  - Replete lytes with a goal of K>4, Mg >2  -   - Patient's afib is currently   - ***      Patient with  Respiratory failure which is .  he is . Supplemental oxygen was provided and noted-      .   Signs/symptoms of respiratory failure include- . Contributing diagnoses includes -  Labs and images were reviewed. Patient . Will treat underlying causes and adjust management of respiratory failure as follows- ***

## 2025-03-18 ENCOUNTER — PATIENT MESSAGE (OUTPATIENT)
Dept: ADMINISTRATIVE | Facility: CLINIC | Age: 85
End: 2025-03-18
Payer: MEDICARE

## 2025-03-18 ENCOUNTER — PATIENT OUTREACH (OUTPATIENT)
Dept: ADMINISTRATIVE | Facility: CLINIC | Age: 85
End: 2025-03-18
Payer: MEDICARE

## 2025-03-18 NOTE — HOSPITAL COURSE
Admitted with hypoxia and weakness, fatigue in setting of influenza A. Started oseltamivir, symptomatic management and O2 weaned, with adequate saturations on room air. Therapy services evaluated and recommended low intensity - patient preferred outpatient over home health. With clinical improvement and vital stability, he was prepared for discharge home.

## 2025-03-18 NOTE — DISCHARGE SUMMARY
[facility] Surg ([address] Medicine  Discharge Summary      Patient Name: Oliver Burk Jr.  MRN: [id number]  YONY: [id number]  Patient Class: IP- Inpatient  Admission Date: 3/16/2025  Hospital Length of Stay: 1 days  Discharge Date and Time: 3/17/2025 12:56 PM  Attending Physician: No att. providers found   Discharging Provider: SHANTHI Churchill MD  Primary Care Provider: Maria Isabel Corral MD    Primary Care Team: Networked reference to record PCT     HPI:   Mr. Burk is an 84/M with PMH HTN, HLD, permanent A-fib (on rivaroxaban), ROMEO (unable to tolerate CPAP), BPH, spinal stenosis with neuropathy who presented to [facility] 03/16 with a three day history of fatigue, fevers, chills, and weakness. He notes a sick contact at work and temperatures of 100.2 and 100.5F at home, though no significant chills or diaphoresis. He attempted acetaminophen for management at home. He notes predominantly nonproductive cough with congestion in addition. He notes falling twice at home due to weakness and knee discomfort and difficulty with his gait, but denies any injuries from his falls. With persistent symptoms he presented to ED for further evaluation. ED workup was notable for hypoxia which improved on 3L O2 to 91-98%, atrial fibrillation with RVR, + influenza A, CXR with bilateral infiltrates, and mild hyponatremia on metabolic panel. He received oseltamivir, albuterol-ipratropium nebulization, and metoprolol 5mg IV x1 with improvement in HR to baseline . Hospital medicine was subsequently contacted for admission.    * No surgery found *      Hospital Course:   Admitted with hypoxia and weakness, fatigue in setting of influenza A. Started oseltamivir, symptomatic management and O2 weaned, with adequate saturations on room air. Therapy services evaluated and recommended low intensity - patient preferred outpatient over home health. With clinical improvement and vital stability, he was prepared for  discharge home.     Goals of Care Treatment Preferences:  Code Status: Full Code    Living Will: Yes              SDOH Screening:  The patient declined to be screened for utility difficulties, food insecurity, transport difficulties, housing insecurity, and interpersonal safety, so no concerns could be identified this admission.     Consults:     Assessment & Plan  Influenza A  Permanent atrial fibrillation with RVR  Acute hypoxemic respiratory failure  - Acute hypoxemic respiratory failure in setting of influenza A requiring 3L O2 via NC.  - Continue oseltamivir 75mg PO BID, albuterol-ipratropium 2.5-0.5mg inhaled q4hr PRN. Start incentive spirometry, flutter valve treatments.  - Start guaifenesin-dextromethorphan 10mL PO q4hr PRN, benzonatate 200mg PO TID PRN cough.  - Wean supplemental O2 as tolerated.  - Atrial fibrillation at baseline managed with metoprolol 25mg PO BID and verapamil 250mg PO qHS; likely RVR in response to acute illness with influenza. Responded well to metoprolol 5mg IV. Continue as 5mg IV q4hr PRN for sustained HR > 130.  - PT/OT evaluations given significant weakness.  HTN (hypertension)  Aortic atherosclerosis  Hyperlipidemia  - Continue statin with atorvastatin 10mg PO qHS.  ROMEO (obstructive sleep apnea)  Insomnia  - Unable to tolerate CPAP outpatient. Supplemental O2 overnight as required.  - Continue alprazolam 0.25mg PO BID PRN anxiety/insomnia. If persistent issues, obtain suvorexant from home to use as non-formulary.  BPH (benign prostatic hyperplasia)  - Continue tamsulosin 0.4mg PO daily.  Debility    Final Active Diagnoses:    Diagnosis Date Noted POA    PRINCIPAL PROBLEM:  Influenza A [J10.1] 03/16/2025 Yes    Acute hypoxemic respiratory failure [J96.01] 03/16/2025 Yes    Hyperlipidemia [E78.5] 03/16/2025 Yes     Chronic    BPH (benign prostatic hyperplasia) [N40.0] 03/16/2025 Yes     Chronic    Insomnia [G47.00] 03/16/2025 Yes    Debility [R53.81] 03/16/2025 Yes    Aortic  atherosclerosis [I70.0] 09/25/2024 Yes     Chronic    ROMEO (obstructive sleep apnea) [G47.33] 01/11/2023 Yes     Chronic    Permanent atrial fibrillation with RVR [I48.21] 10/07/2022 Yes    HTN (hypertension) [I10] 10/07/2022 Yes     Chronic      Problems Resolved During this Admission:       Discharged Condition: good    Disposition: Home or Self Care    Follow Up:   Follow-up Information       Maria Isabel Corral MD Follow up in 2 week(s).    Specialty: Internal Medicine  Why: post-hospital follow-up    Clinic will notify you with follow up appt  Contact information:  [address]ari L[address]DBER[address]  [facility] [address]  [phone]                           Patient Instructions:      Ambulatory Referral/Consult to Physical Therapy   Standing Status: Future   Referral Priority: Routine Referral Type: Physical Therapy   Referral Reason: Specialty Services Required   Number of Visits Requested: 1     Ambulatory Referral/Consult to Occupational Therapy   Standing Status: Future   Referral Priority: Routine Referral Type: Occupational Therapy   Referral Reason: Specialty Services Required   Requested Specialty: Occupational Therapy   Number of Visits Requested: 1     Diet Cardiac     Notify your health care provider if you experience any of the following:  temperature >100.4     Notify your health care provider if you experience any of the following:  increased confusion or weakness     Notify your health care provider if you experience any of the following:  persistent dizziness, light-headedness, or visual disturbances     Notify your health care provider if you experience any of the following:  difficulty breathing or increased cough     Activity as tolerated       Significant Diagnostic Studies:   CBC:  Recent Labs   Lab 03/16/25  1003 03/17/25  0332   WBC 7.14 6.73   HGB 12.2* 12.7*   HCT 35.7* 39.8*   * 131*   GRAN 65.7  4.7 64.6  4.3   LYMPH 13.3*  1.0 17.4*  1.2   MONO 20.4*  1.5* 16.9*  1.1*   EOS 0.0 0.0    BASO 0.02 0.03     BMP:  Recent Labs   Lab 03/16/25  1003 03/17/25  0332   * 136   K 4.1 4.2    103   CO2 22* 21*   BUN 13 13   CREATININE 0.8 0.7   * 104   CALCIUM 9.3 9.4   MG  --  2.0   PHOS  --  3.1     CMP:  Recent Labs   Lab 03/16/25  1003 03/17/25  0332   * 136   K 4.1 4.2    103   CO2 22* 21*   BUN 13 13   CREATININE 0.8 0.7   * 104   CALCIUM 9.3 9.4   MG  --  2.0   PHOS  --  3.1   ALKPHOS 76  --    AST 31  --    ALT 24  --    BILITOT 1.6*  --    PROT 6.5  --    ALBUMIN 3.4*  --    ANIONGAP 9 12     Imaging Results              X-Ray Chest AP Portable (Final result)  Result time 03/16/25 11:29:16      Final result by Garrett Ferrari MD (03/16/25 11:29:16)                   Impression:      Bilateral perihilar and lower lung interstitial findings.  Correlate for non interstitial pneumonitis or early interstitial edema.      Electronically signed by: Garrett Ferrari MD  Date:    03/16/2025  Time:    11:29               Narrative:    EXAMINATION:  XR CHEST AP PORTABLE    CLINICAL HISTORY:  Cough, unspecified    TECHNIQUE:  Single frontal view of the chest was performed.    COMPARISON:  05/29/2018    FINDINGS:  Cardiac silhouette and mediastinal contours normal for portable technique.  There is mild perihilar interstitial prominence with bibasilar interstitial opacity greater at the left lung base.                                      Pending Diagnostic Studies:       None           Medications:  Reconciled Home Medications:      Medication List        START taking these medications      dextromethorphan-guaiFENesin 5-100 mg/5 mL Liqd  Take 10 mLs by mouth every 4 (four) hours as needed (Cough).     oseltamivir 75 MG capsule  Commonly known as: TAMIFLU  Take 1 capsule (75 mg total) by mouth 2 (two) times daily. for 4 days            CONTINUE taking these medications      ALPRAZolam 0.25 MG tablet  Commonly known as: XANAX  Take 1 tablet (0.25 mg total) by mouth 2  (two) times daily as needed for Anxiety. Take one tablet hs for sleep     ascorbic acid (vitamin C) 250 MG tablet  Commonly known as: VITAMIN C  Take 250 mg by mouth once daily.     BELSOMRA 15 mg Tab  Generic drug: suvorexant  Take 1 tablet by mouth nightly as needed.     calcium citrate 200 mg (950 mg) tablet  Commonly known as: CALCITRATE  Take 2 tablets by mouth once daily.     gabapentin 300 MG capsule  Commonly known as: NEURONTIN  TAKE 1 CAPSULE BY MOUTH 3 TIMES DAILY AS NEEDED     metoprolol succinate 25 MG 24 hr tablet  Commonly known as: TOPROL-XL  Take 1 tablet (25 mg total) by mouth 2 (two) times daily.     PROBIOTIC-10 ORAL  Take by mouth.     simvastatin 20 MG tablet  Commonly known as: ZOCOR  Take 1 tablet (20 mg total) by mouth every evening.     tamsulosin 0.4 mg Cap  Commonly known as: FLOMAX  Take 1 capsule (0.4 mg total) by mouth every evening.     verapamiL 240 MG CR tablet  Commonly known as: CALAN-SR  Take 1 tablet (240 mg total) by mouth once daily.     vitamin D 1000 units Tab  Commonly known as: VITAMIN D3  Take 1,000 Units by mouth once daily.     XARELTO 20 mg Tab  Generic drug: rivaroxaban  TAKE 1 TABLET BY MOUTH EVERY DAY WITH DINNER OR EVENING MEAL              Indwelling Lines/Drains at time of discharge:   Lines/Drains/Airways       None                   Time spent on the discharge of patient: 35 minutes         SHANTHI Churchill MD  Department of Hospital Medicine  [facility] Surg ([address])

## 2025-03-18 NOTE — PROGRESS NOTES
C3 nurse attempted to contact Oliver Burk Jr. for a TCC post hospital discharge follow up call. No answer. Left voicemail with callback information. The patient does not have a scheduled HOSFU appointment. Message sent to PCP staff for assistance with scheduling visit with patient.

## 2025-04-04 ENCOUNTER — CLINICAL SUPPORT (OUTPATIENT)
Dept: INTERNAL MEDICINE | Facility: CLINIC | Age: 85
End: 2025-04-04
Payer: MEDICARE

## 2025-04-04 ENCOUNTER — TELEPHONE (OUTPATIENT)
Dept: INTERNAL MEDICINE | Facility: CLINIC | Age: 85
End: 2025-04-04
Payer: MEDICARE

## 2025-04-04 DIAGNOSIS — I48.21 PERMANENT ATRIAL FIBRILLATION WITH RVR: ICD-10-CM

## 2025-04-04 DIAGNOSIS — I48.21 PERMANENT ATRIAL FIBRILLATION WITH RVR: Primary | ICD-10-CM

## 2025-04-04 LAB
ABSOLUTE EOSINOPHIL (OHS): 0.08 K/UL
ABSOLUTE MONOCYTE (OHS): 1.18 K/UL (ref 0.3–1)
ABSOLUTE NEUTROPHIL COUNT (OHS): 3.15 K/UL (ref 1.8–7.7)
ALBUMIN SERPL BCP-MCNC: 3.6 G/DL (ref 3.5–5.2)
ALP SERPL-CCNC: 93 UNIT/L (ref 40–150)
ALT SERPL W/O P-5'-P-CCNC: 10 UNIT/L (ref 10–44)
ANION GAP (OHS): 7 MMOL/L (ref 8–16)
AST SERPL-CCNC: 14 UNIT/L (ref 11–45)
BASOPHILS # BLD AUTO: 0.07 K/UL
BASOPHILS NFR BLD AUTO: 1 %
BILIRUB SERPL-MCNC: 1.1 MG/DL (ref 0.1–1)
BNP SERPL-MCNC: 301 PG/ML (ref 0–99)
BUN SERPL-MCNC: 13 MG/DL (ref 8–23)
CALCIUM SERPL-MCNC: 10.1 MG/DL (ref 8.7–10.5)
CHLORIDE SERPL-SCNC: 104 MMOL/L (ref 95–110)
CO2 SERPL-SCNC: 25 MMOL/L (ref 23–29)
CREAT SERPL-MCNC: 0.8 MG/DL (ref 0.5–1.4)
ERYTHROCYTE [DISTWIDTH] IN BLOOD BY AUTOMATED COUNT: 12.4 % (ref 11.5–14.5)
GFR SERPLBLD CREATININE-BSD FMLA CKD-EPI: >60 ML/MIN/1.73/M2
GLUCOSE SERPL-MCNC: 103 MG/DL (ref 70–110)
HCT VFR BLD AUTO: 39.7 % (ref 40–54)
HGB BLD-MCNC: 13 GM/DL (ref 14–18)
IMM GRANULOCYTES # BLD AUTO: 0.02 K/UL (ref 0–0.04)
IMM GRANULOCYTES NFR BLD AUTO: 0.3 % (ref 0–0.5)
LYMPHOCYTES # BLD AUTO: 2.33 K/UL (ref 1–4.8)
MAGNESIUM SERPL-MCNC: 2.1 MG/DL (ref 1.6–2.6)
MCH RBC QN AUTO: 32.6 PG (ref 27–31)
MCHC RBC AUTO-ENTMCNC: 32.7 G/DL (ref 32–36)
MCV RBC AUTO: 100 FL (ref 82–98)
NUCLEATED RBC (/100WBC) (OHS): 0 /100 WBC
PLATELET # BLD AUTO: 278 K/UL (ref 150–450)
PMV BLD AUTO: 10 FL (ref 9.2–12.9)
POTASSIUM SERPL-SCNC: 4.6 MMOL/L (ref 3.5–5.1)
PROT SERPL-MCNC: 7.3 GM/DL (ref 6–8.4)
RBC # BLD AUTO: 3.99 M/UL (ref 4.6–6.2)
RELATIVE EOSINOPHIL (OHS): 1.2 %
RELATIVE LYMPHOCYTE (OHS): 34.1 % (ref 18–48)
RELATIVE MONOCYTE (OHS): 17.3 % (ref 4–15)
RELATIVE NEUTROPHIL (OHS): 46.1 % (ref 38–73)
SODIUM SERPL-SCNC: 136 MMOL/L (ref 136–145)
TSH SERPL-ACNC: 1.11 UIU/ML (ref 0.4–4)
WBC # BLD AUTO: 6.83 K/UL (ref 3.9–12.7)

## 2025-04-04 PROCEDURE — 84443 ASSAY THYROID STIM HORMONE: CPT

## 2025-04-04 PROCEDURE — 36415 COLL VENOUS BLD VENIPUNCTURE: CPT

## 2025-04-04 PROCEDURE — 84520 ASSAY OF UREA NITROGEN: CPT

## 2025-04-04 PROCEDURE — 83880 ASSAY OF NATRIURETIC PEPTIDE: CPT

## 2025-04-04 PROCEDURE — 83735 ASSAY OF MAGNESIUM: CPT

## 2025-04-04 PROCEDURE — 85025 COMPLETE CBC W/AUTO DIFF WBC: CPT

## 2025-04-04 NOTE — TELEPHONE ENCOUNTER
Will increase daytime Toprol to 50 mg and get updated labs today. ER if increasing hr's above 120.

## 2025-04-04 NOTE — TELEPHONE ENCOUNTER
----- Message from Med Assistant Alejandra sent at 4/4/2025  8:16 AM CDT -----  C/O heart rate going up to the 120's and then back down to the normal range of 60-70. Hx of [id number] got D/C'd from hospitals for the flu

## 2025-04-06 ENCOUNTER — RESULTS FOLLOW-UP (OUTPATIENT)
Dept: INTERNAL MEDICINE | Facility: CLINIC | Age: 85
End: 2025-04-06

## 2025-04-08 ENCOUNTER — TELEPHONE (OUTPATIENT)
Dept: INTERNAL MEDICINE | Facility: CLINIC | Age: 85
End: 2025-04-08
Payer: MEDICARE

## 2025-04-08 DIAGNOSIS — R29.898 LEG WEAKNESS, BILATERAL: ICD-10-CM

## 2025-04-08 DIAGNOSIS — M48.062 NEUROGENIC CLAUDICATION DUE TO LUMBAR SPINAL STENOSIS: ICD-10-CM

## 2025-04-08 DIAGNOSIS — R26.9 GAIT ABNORMALITY: ICD-10-CM

## 2025-04-08 DIAGNOSIS — M54.9 DORSALGIA, UNSPECIFIED: ICD-10-CM

## 2025-04-08 DIAGNOSIS — M48.062 SPINAL STENOSIS OF LUMBAR REGION WITH NEUROGENIC CLAUDICATION: Primary | ICD-10-CM

## 2025-04-09 RX ORDER — METOPROLOL SUCCINATE 50 MG/1
50 TABLET, EXTENDED RELEASE ORAL 2 TIMES DAILY
Qty: 180 TABLET | Refills: 3 | Status: SHIPPED | OUTPATIENT
Start: 2025-04-09

## 2025-04-09 NOTE — TELEPHONE ENCOUNTER
No care due was identified.  [facility] Embedded Care Due Messages. Reference number: [id number].   4/09/2025 3:23:57 AM CDT

## 2025-04-10 ENCOUNTER — OFFICE VISIT (OUTPATIENT)
Dept: INTERNAL MEDICINE | Facility: CLINIC | Age: 85
End: 2025-04-10
Payer: MEDICARE

## 2025-04-10 DIAGNOSIS — M48.062 SPINAL STENOSIS OF LUMBAR REGION WITH NEUROGENIC CLAUDICATION: ICD-10-CM

## 2025-04-10 DIAGNOSIS — G89.29 CHRONIC PAIN OF LEFT KNEE: ICD-10-CM

## 2025-04-10 DIAGNOSIS — R26.81 UNSTEADINESS ON FEET: ICD-10-CM

## 2025-04-10 DIAGNOSIS — M25.562 CHRONIC PAIN OF LEFT KNEE: ICD-10-CM

## 2025-04-10 DIAGNOSIS — I48.20 CHRONIC ATRIAL FIBRILLATION: Primary | ICD-10-CM

## 2025-04-10 DIAGNOSIS — R26.9 GAIT ABNORMALITY: ICD-10-CM

## 2025-04-11 ENCOUNTER — TELEPHONE (OUTPATIENT)
Dept: INTERNAL MEDICINE | Facility: CLINIC | Age: 85
End: 2025-04-11
Payer: MEDICARE

## 2025-04-11 NOTE — TELEPHONE ENCOUNTER
Mónica     Pt best friends with dr jorge leach and he would like patient to see dr yang - referral in

## 2025-04-11 NOTE — PROGRESS NOTES
"[facility] Virtual Visit Note    The patient location is: [place]  The chief complaint leading to consultation is: LEFT KNEE PAIN AND BALANCE  Visit type: Virtual visit with synchronous audio and video  Total time spent with patient: 20 min--MEDICAL DECISION MAKING TIME:  20 MIN    Each patient to whom he or she provides medical services by telemedicine is:  (1) informed of the relationship between the physician and patient and the respective role of any other health care provider with respect to management of the patient; and (2) notified that he or she may decline to receive medical services by telemedicine and may withdraw from such care at any time.    Subjective:       Patient ID: Oliver Burk Jr. is a 84 y.o. male who presents today for:    Chief Complaint:   Chief Complaint   Patient presents with    gait and balance     Knee Pain       HPI:  History of Present Illness    CHIEF COMPLAINT:  Patient presents today for follow-up on multiple issues    MUSCULOSKELETAL:  He reports different sensation in left knee compared to right knee, describing it as feeling "thick" though improves with knee flexion exercises. He has difficulty with long-distance walking and requires use of cane for stability. He is unable to perform heel-to-toe walking due to balance impairment. He previously received care with Dr. Menjivar for back issues at [facility] and participated in the Healthy Sp[facility] and Back program at [facility] for approximately nine months, which included balance exercises and therapist-supported stepping exercises.    RECENT HOSPITALIZATION:  He was recently hospitalized for 2-3 days due to fever of 102°F, which resolved prior to discharge on Monday morning.    MEDICATIONS:  He takes Metoprolol 50 mg in the morning and 25 mg at night, and Verapamil one tablet at night. He reports sleeping well and feeling fine with this regimen.      ROS:  General: -fever, -chills, -fatigue, -weight gain, " -weight loss  Eyes: -vision changes, -redness, -discharge  ENT: -ear pain, -nasal congestion, -sore throat  Cardiovascular: -chest pain, -palpitations, -lower extremity edema  Respiratory: -cough, -shortness of breath  Gastrointestinal: -abdominal pain, -nausea, -vomiting, -diarrhea, -constipation, -blood in stool  Genitourinary: -dysuria, -hematuria, -frequency  Musculoskeletal: +joint pain, -muscle pain, +difficulty walking  Skin: -rash, -lesion  Neurological: -headache, -dizziness, -numbness, -tingling, +balance issues  Psychiatric: -anxiety, -depression, -sleep difficulty            Medications:  Encounter Medications[1]    Allergies:  Review of patient's allergies indicates:  No Known Allergies    Health Maintenance:  Immunization History   Administered Date(s) Administered    COVID-19, MRNA, LN-S, PF (Pfizer) (Gray Cap) 07/27/2022    COVID-19, MRNA, LN-S, PF (Pfizer) (Purple Cap) 01/09/2021, 01/30/2021, 08/15/2021    Hepatitis A, Adult 12/09/2008    IPV 12/09/2008    Influenza (FLUAD) - Quadrivalent - Adjuvanted - PF *Preferred* (65+) 11/28/2021, 11/19/2022, 09/08/2023    Influenza - Quadrivalent - High Dose - PF (65 years and older) 11/15/2020    Influenza - Trivalent - Fluzone High Dose - PF (65 years and older) 10/12/2016    Pneumococcal Conjugate - 13 Valent 07/24/2023    Pneumococcal Conjugate - 20 Valent 08/30/2024    Typhoid - ViCPs 12/09/2008      Health Maintenance   Topic Date Due    Shingles Vaccine (1 of 2) Never done    RSV Vaccine (Age 60+ and Pregnant patients) (1 - 1-dose 75+ series) Never done    TETANUS VACCINE  12/09/2018    Influenza Vaccine (1) 09/01/2024    COVID-19 Vaccine (5 - 2024-25 season) 09/01/2024    Lipid Panel  08/30/2029    Pneumococcal Vaccines (Age 50+)  Completed          Objective:       ]    Physical Exam  Neurological:      Mental Status: He is alert.          Assessment/plan:     Oliver Rootamy Rodríguez is a 84 y.o.male with:    There are no diagnoses linked to this  encounter.  Assessment & Plan        R26.89 Other abnormalities of gait and mobility  R26.81 Unsteadiness on feet  Z99.89 Dependence on other enabling machines and devices  Z86.19 Personal history of other infectious and parasitic diseases    IMPRESSION:  - Assessed primary complaint as left knee issue rather than back pain, contrary to initial referral suggestion.  - Considered balance and strength issues as potential factors contributing to mobility concerns.  - Evaluated current AFib management regimen, noting positive response to current medication dosages.  - Reviewed recent lab results, finding no significant abnormalities.    CHRONIC ATRIAL FIB WITH OCC RVR - ASYMPTOMATIC  - Continue current medication regimen: metoprolol 50 mg in the morning and half at night, and verapamil 240 mg, 1 tablet at night.  - This regimen helps control AFib and may also manage pulmonary hypertension symptoms.    ADDRESSED PATIENT'S REPORTS OF BALANCE ISSUES AND LEFT KNEE PROBLEMS, WHICH COULD BE RELATED TO PULMONARY HYPERTENSION SYMPTOMS. PLAN TO:  - 1.  - Consult with neurology and Dr. Fagan for further evaluation. 2.  - Refer patient for physical therapy at [facility] to improve balance, potentially alleviating symptoms related to pulmonary hypertension.    SKIN SENSATION DISTURBANCE:  - Noted the patient's report of a different sensation in the left knee compared to the right knee.  - Acknowledged that the knee issue might be separate from the back issue.    GAIT AND MOBILITY ABNORMALITIES:  - Observed that the patient was unable to perform standard field sobriety test due to balance issues.  - Acknowledged that balance issues are common in the patient's age group and may be nerve-related.    DEPENDENCE ON ENABLING DEVICES:  - Noted the patient's use of a cane for steadiness and stability when walking long distances.    HISTORY OF INFECTIOUS DISEASE:  - Noted the patient's recent hospitalization due to fever.  - Reviewed  recent labs results, which appear normal after the patient's illness.    FOLLOW-UP:  - Patient to continue exercises and workouts at the [facility] as tolerated.        No future appointments.  This note was generated with the assistance of ambient listening technology. Verbal consent was obtained by the patient and accompanying visitor(s) for the recording of patient appointment to facilitate this note. I attest to having reviewed and edited the generated note for accuracy, though some syntax or spelling errors may persist. Please contact the author of this note for any clarification.     Maria Isabel Osborne MD  [facility]       [1]   Outpatient Encounter Medications as of 4/10/2025   Medication Sig Dispense Refill    ALPRAZolam (XANAX) 0.25 MG tablet Take 1 tablet (0.25 mg total) by mouth 2 (two) times daily as needed for Anxiety. Take one tablet hs for sleep 45 tablet 2    ascorbic acid, vitamin C, (VITAMIN C) 250 MG tablet Take 250 mg by mouth once daily.      calcium citrate (CALCITRATE) 200 mg (950 mg) tablet Take 2 tablets by mouth once daily.      gabapentin (NEURONTIN) 300 MG capsule TAKE 1 CAPSULE BY MOUTH 3 TIMES DAILY AS NEEDED 90 capsule 3    Lactobac no.41/Bifidobact no.7 (PROBIOTIC-10 ORAL) Take by mouth.      metoprolol succinate (TOPROL-XL) 25 MG 24 hr tablet Take 1 tablet (25 mg total) by mouth 2 (two) times daily. 60 tablet 2    metoprolol succinate (TOPROL-XL) 50 MG 24 hr tablet TAKE 1 TABLET(50 MG) BY MOUTH TWICE DAILY 180 tablet 3    simvastatin (ZOCOR) 20 MG tablet Take 1 tablet (20 mg total) by mouth every evening. 90 tablet 3    suvorexant (BELSOMRA) 15 mg Tab Take 1 tablet by mouth nightly as needed. 28 tablet 0    tamsulosin (FLOMAX) 0.4 mg Cap Take 1 capsule (0.4 mg total) by mouth every evening. 90 capsule 1    verapamiL (CALAN-SR) 240 MG CR tablet Take 1 tablet (240 mg total) by mouth once daily. 30 tablet 2    vitamin D (VITAMIN D3) 1000 units Tab Take 1,000  Units by mouth once daily.      XARELTO 20 mg Tab TAKE 1 TABLET BY MOUTH EVERY DAY WITH DINNER OR EVENING MEAL 90 tablet 3     No facility-administered encounter medications on file as of 4/10/2025.

## 2025-04-23 ENCOUNTER — TELEPHONE (OUTPATIENT)
Dept: INTERNAL MEDICINE | Facility: CLINIC | Age: 85
End: 2025-04-23
Payer: MEDICARE

## 2025-04-23 DIAGNOSIS — I48.20 CHRONIC ATRIAL FIBRILLATION: Primary | ICD-10-CM

## 2025-04-23 NOTE — TELEPHONE ENCOUNTER
Please set patient up with EP cardiology . Referral in   He saw dr johnston in 2022    I will be calling him in the morning .

## 2025-05-05 DIAGNOSIS — F41.9 ANXIETY: ICD-10-CM

## 2025-05-05 RX ORDER — ALPRAZOLAM 0.25 MG/1
0.25 TABLET ORAL 2 TIMES DAILY PRN
Qty: 45 TABLET | Refills: 2 | Status: SHIPPED | OUTPATIENT
Start: 2025-05-05 | End: 2025-05-06 | Stop reason: SDUPTHER

## 2025-05-06 DIAGNOSIS — F41.9 ANXIETY: ICD-10-CM

## 2025-05-06 RX ORDER — ALPRAZOLAM 0.25 MG/1
0.25 TABLET ORAL 2 TIMES DAILY PRN
Qty: 45 TABLET | Refills: 2 | Status: SHIPPED | OUTPATIENT
Start: 2025-05-06

## 2025-05-22 ENCOUNTER — OFFICE VISIT (OUTPATIENT)
Dept: INTERNAL MEDICINE | Facility: CLINIC | Age: 85
End: 2025-05-22
Payer: MEDICARE

## 2025-05-22 DIAGNOSIS — R53.83 FATIGUE, UNSPECIFIED TYPE: ICD-10-CM

## 2025-05-22 DIAGNOSIS — I48.20 CHRONIC ATRIAL FIBRILLATION: Primary | ICD-10-CM

## 2025-05-22 NOTE — PROGRESS NOTES
Audio Only Telehealth Visit     The patient location is: home in [facility]  The chief complaint leading to consultation is:  Fatigue for atrial fib for potential ablation  Visit type: Virtual visit with audio only (telephone)  Total time spent in medical discussion with patient: 15 minutes  Total time spent on date of the encounter:20 minutes       The reason for the audio only service rather than synchronous audio and video virtual visit was related to technical difficulties or patient preference/necessity.       Each patient to whom I provide medical services by telemedicine is:  (1) informed of the relationship between the physician and patient and the respective role of any other health care provider with respect to management of the patient; and (2) notified that they may decline to receive medical services by telemedicine and may withdraw from such care at any time. Patient verbally consented to receive this service via voice-only telephone call.  This service was not originating from a related E/M service provided within the previous 7 days nor will  to an E/M service or procedure within the next 24 hours or my soonest available appointment.  Prevailing standard of care was able to be met in this audio-only visit.      Subjective:       Patient ID: Oliver Burk Jr. is a 84 y.o. male who presents today for:    Chief Complaint:   Chief Complaint   Patient presents with    Fatigue    Atrial Fibrillation     History of Present Illness    CHIEF COMPLAINT:  Patient presents today to discuss concerns about Metoprolol side effects and atrial fibrillation management.    CARDIOVASCULAR HISTORY:  He had a cardioversion in October 2022 performed by Dr. Janice Leon, which was temporarily effective but subsequently reverted to atrial fibrillation.    MEDICATION SIDE EFFECTS:  He reports experiencing fatigue from Metoprolol (current dosage 50 mg morning, 25 mg night), requiring a half-hour nap after work. He notes  decreased energy levels and lack of motivation for morning workouts.    CURRENT MEDICATIONS:  He is taking Verapamil 240 mg.      ROS:  General: -fever, -chills, +fatigue, -weight gain, -weight loss  Eyes: -vision changes, -redness, -discharge  ENT: -ear pain, -nasal congestion, -sore throat  Cardiovascular: -chest pain, -palpitations, -lower extremity edema  Respiratory: -cough, -shortness of breath  Gastrointestinal: -abdominal pain, -nausea, -vomiting, -diarrhea, -constipation, -blood in stool  Genitourinary: -dysuria, -hematuria, -frequency  Musculoskeletal: -joint pain, -muscle pain  Skin: -rash, -lesion  Neurological: -headache, -dizziness, -numbness, -tingling  Psychiatric: -anxiety, -depression, -sleep difficulty        Medications:  Encounter Medications[1]    Allergies:  Review of patient's allergies indicates:  No Known Allergies    Health Maintenance:  Immunization History   Administered Date(s) Administered    COVID-19, MRNA, LN-S, PF (Pfizer) (Gray Cap) 07/27/2022    COVID-19, MRNA, LN-S, PF (Pfizer) (Purple Cap) 01/09/2021, 01/30/2021, 08/15/2021    Hepatitis A, Adult 12/09/2008    IPV 12/09/2008    Influenza (FLUAD) - Quadrivalent - Adjuvanted - PF *Preferred* (65+) 11/28/2021, 11/19/2022, 09/08/2023    Influenza - Quadrivalent - High Dose - PF (65 years and older) 11/15/2020    Influenza - Trivalent - Fluzone High Dose - PF (65 years and older) 10/12/2016    Pneumococcal Conjugate - 13 Valent 07/24/2023    Pneumococcal Conjugate - 20 Valent 08/30/2024    Typhoid - ViCPs 12/09/2008      Health Maintenance   Topic Date Due    Shingles Vaccine (1 of 2) Never done    RSV Vaccine (Age 60+ and Pregnant patients) (1 - 1-dose 75+ series) Never done    TETANUS VACCINE  12/09/2018    COVID-19 Vaccine (5 - 2024-25 season) 09/01/2024    Influenza Vaccine (Season Ended) 09/01/2025    Lipid Panel  08/30/2029    Pneumococcal Vaccines (Age 50+)  Completed          Objective:       ]    Physical Exam      Assessment/plan:     Oliver Burk Jr. is a 84 y.o.male with:    There are no diagnoses linked to this encounter.  Assessment & Plan    I48.19 Other persistent atrial fibrillation  T44.7X5A Adverse effect of beta-adrenoreceptor antagonists, initial encounter  R53.83 Other fatigue    ## PERSISTENT ATRIAL FIBRILLATION:  - Evaluated patient's return to atrial fibrillation after cardioversion.  - Discussed treatment options including ablation, which could potentially eliminate need for Metoprolol and Verapamil.  - Explained special heart mapping with specialized CT scan to identify AFib source.  - Noted that cardioversion risks are transient compared to the more significant ablation procedure.  - Continued Verapamil 240 mg (higher dose) and refilled Vrayloolar.  - Referred patient to Dr. Pb Long, electrophysiologist at [facility], for July 7th appointment at 11:20 AM.  - Also considered Dr. Jurado at [facility] for second opinion and specialized heart mapping.  - Instructed patient to contact office if earlier appointment becomes available.  - Discussed options between in-house electrophysiologists (Dr. David Long, Dr. Leon) and external specialist (Dr. David Jurado) for ablation procedure.  - Mentioned Digoxin as potential alternative medication that does not affect BP.    ## ADVERSE EFFECT OF BETA-BLOCKERS:  - Reduced Metoprolol dosage to 25 mg twice daily while maintaining Verapamil at 240 mg due to patient reporting listlessness and fatigue after taking Metoprolol, requiring a 30-minute nap after returning home at night.  - Will monitor pulse after dosage reduction.  - Discussed Digoxin as potential alternative medication that does not affect BP.    ## FATIGUE:  - Monitored patient's reports of feeling listless and tired, which is affecting daily activities and exercise routine.  - Evaluated these symptoms in relation to medication side effects and overall condition.    ## HEART RATE MONITORING:  - Monitored  patient's heart rate, which is usually around 80-82 at rest, sometimes 79, and can reach up to 135 during exercise and 150 at peak exercise.  - Explained target heart rate ranges: 60-90 at rest, up to 120 during rapid walking, and 135-150 at peak exercise.        Future Appointments   Date Time Provider Department Center   5/28/2025  1:00 PM Elida Castellanos PA-C [facility] ALEYDA Pastrana   7/7/2025 11:20 AM Pb Huntley MD [facility] ALBERTA Roque [facility]     This note was generated with the assistance of ambient listening technology. Verbal consent was obtained by the patient and accompanying visitor(s) for the recording of patient appointment to facilitate this note. I attest to having reviewed and edited the generated note for accuracy, though some syntax or spelling errors may persist. Please contact the author of this note for any clarification.     Maria Isabel Osborne MD  [facility]       [1]   Outpatient Encounter Medications as of 5/22/2025   Medication Sig Dispense Refill    ALPRAZolam (XANAX) 0.25 MG tablet Take 1 tablet (0.25 mg total) by mouth 2 (two) times daily as needed for Anxiety or Insomnia. 45 tablet 2    ascorbic acid, vitamin C, (VITAMIN C) 250 MG tablet Take 250 mg by mouth once daily.      calcium citrate (CALCITRATE) 200 mg (950 mg) tablet Take 2 tablets by mouth once daily.      gabapentin (NEURONTIN) 300 MG capsule TAKE 1 CAPSULE BY MOUTH 3 TIMES DAILY AS NEEDED 90 capsule 3    Lactobac no.41/Bifidobact no.7 (PROBIOTIC-10 ORAL) Take by mouth.      metoprolol succinate (TOPROL-XL) 25 MG 24 hr tablet Take 1 tablet (25 mg total) by mouth 2 (two) times daily. 60 tablet 2    metoprolol succinate (TOPROL-XL) 50 MG 24 hr tablet TAKE 1 TABLET(50 MG) BY MOUTH TWICE DAILY 180 tablet 3    simvastatin (ZOCOR) 20 MG tablet Take 1 tablet (20 mg total) by mouth every evening. 90 tablet 3    suvorexant (BELSOMRA) 15 mg Tab Take 1 tablet by mouth nightly as needed. 28 tablet 0    tamsulosin (FLOMAX)  0.4 mg Cap Take 1 capsule (0.4 mg total) by mouth every evening. 90 capsule 1    verapamiL (CALAN-SR) 240 MG CR tablet Take 1 tablet (240 mg total) by mouth once daily. 30 tablet 2    vitamin D (VITAMIN D3) 1000 units Tab Take 1,000 Units by mouth once daily.      XARELTO 20 mg Tab TAKE 1 TABLET BY MOUTH EVERY DAY WITH DINNER OR EVENING MEAL 90 tablet 3     No facility-administered encounter medications on file as of 5/22/2025.

## 2025-05-26 RX ORDER — VERAPAMIL HCL 240 MG
240 TABLET, EXTENDED RELEASE ORAL DAILY
Qty: 90 TABLET | Refills: 1 | Status: SHIPPED | OUTPATIENT
Start: 2025-05-26 | End: 2025-05-30 | Stop reason: SDUPTHER

## 2025-05-27 ENCOUNTER — TELEPHONE (OUTPATIENT)
Dept: INTERNAL MEDICINE | Facility: CLINIC | Age: 85
End: 2025-05-27
Payer: MEDICARE

## 2025-05-27 DIAGNOSIS — I48.91 ATRIAL FIBRILLATION BY ELECTROCARDIOGRAM: Primary | ICD-10-CM

## 2025-05-27 NOTE — PROGRESS NOTES
Name: Oliver Burk Jr.  MRN: [id number]   [facility]: [id number]      Date: 05/28/2025    Referring physician:  No referring provider defined for this encounter.    Chief Complaint: gait abnormality     History of Present Illness (HPI): Oliver Burk Jr. is a R handed 84 y.o. male with a medical issues significant for HTN, ROMEO, HLD, aortic atherosclerosis, BPH who presents for gait abnormality. Here with spouse. Some limping in L knee. History of 2 lumbar surgeries for spinal stenosis. Feels like L leg has a mind of its own, sometimes feels swollen, sometimes aching pain in the L leg. Cannot transfer weight onto left side predictably. Comes and goes. Sometimes uses a cane. This has been happening for 3-4 years. No symptoms in his R leg. There are times that the L leg hurts, he will bring his heel to buttocks and this relieves the pain. Only pain in the knee -- no radiation. He can make the pain go away by stretching. He has seen ortho in the past and was told not ortho in origin. One fall whenever he had the flu, generalized weakness. Slipped on the floor and went to ground. No major injuries. Does not shuffle his feet. Whenever LLE hurts, he doesn't want to put pressure on it. Mostly hurts on the lateral portion of the L knee. On flomax for BPH, no constipation. Denies numbness or pins and needles in L knee. Numbness in big toe on the L foot.   Wife has noticed tremor before. Normal sense of smell. No constipation. Talks in his sleep per spouse, full sentences. Denies hypophonia. He has never really noticed a tremor before except when using a spoon. Not sure if tremor improves with etoh.     At times when walking feels like L leg unintentionally with lock up. Denies toe curling or other abnormal postures of the LLE.     Works out.    Metoprolol for afib, makes him exhausted. It was decreased at one point and verapamil was increased. Supplementing with oral B12.       Oct 2024 with Dr. Rosario  Oliver Burk   is a 84 y.o. male presenting for evaluation of episodic pain/stiffness in the left knee.   On his gait assessment, his left knee seems to give away when he puts pressure on the left leg.  He does have decreased vibration sense at the ankles in both lower extremities although does not have an ataxic gait  Has a history of mild/moderate lumbar spinal stenosis along with a history of spinal decompression surgery.     Suspect an orthopedic cause.  Do not feel that his issues with the left knee are related to his lumbar spine.  Given the localized nature of his symptoms, do not suspect a peripheral neuropathy either.  Will check a B12 and supplement if deficient.     He already has a follow up scheduled with Orthopedics on 11/27.       Review of Systems:   Review of Systems   Constitutional:  Negative for chills, fever and malaise/fatigue.   HENT:  Negative for hearing loss.    Eyes:  Negative for blurred vision and double vision.   Respiratory:  Negative for cough, shortness of breath and stridor.    Cardiovascular:  Negative for chest pain and leg swelling.   Gastrointestinal:  Negative for constipation, diarrhea and nausea.   Genitourinary:  Negative for frequency and urgency.   Musculoskeletal:  Positive for joint pain. Negative for falls.   Skin:  Negative for itching and rash.   Neurological:  Negative for dizziness, tremors, loss of consciousness and weakness.   Psychiatric/Behavioral:  Negative for hallucinations and memory loss.            Past Medical History: The patient  has a past medical history of Arthritis, Atrial fibrillation, Basal cell carcinoma, Cancer, Hyperlipidemia, Hypertension, and SCC (squamous cell carcinoma) (01/29/2025).    Social History: The patient  reports that he has never smoked. He has never used smokeless tobacco. He reports current alcohol use of about 12.0 standard drinks of alcohol per week. He reports that he does not use drugs.    Family History: Their family history includes  "COPD in his mother; Cancer in his father; Heart disease in his father; Heart failure in his father; Osteoporosis in his mother.    Allergies: Patient has no known allergies.     Meds: Medications Ordered Prior to Encounter[1]    Exam:  /86   Pulse 81   Ht 5' 11" (1.803 m)   Wt 83 kg (183 lb)   BMI 25.52 kg/m²     Constitutional  Well-developed, well-nourished, appears stated age   Ophthalmoscopic  No papilledema with no hemorrhages or exudates bilaterally   Cardiovascular  Radial pulses 2+ and symmetric, no LE edema bilaterally   Neurological    * Mental status  MOCA =      - Orientation  Oriented to person, place, time, and situation     - Memory   Intact recent and remote     - Attention/concentration  Attentive, vigilant during exam     - Language  Naming & repetition intact, +2-step commands     - Fund of knowledge  Aware of current events     - Executive  Well-organized thoughts     - Other     * Cranial nerves       - CN II  PERRL, visual fields full to confrontation     - CN III, IV, VI  Extraocular movements full, normal pursuits and saccades     - CN V  Sensation V1 - V3 intact     - CN VII  Face strong and symmetric bilaterally     - CN VIII  Hearing intact bilaterally     - CN IX, X  Palate raises midline and symmetric     - CN XI  SCM and trapezius 5/5 bilaterally     - CN XII  Tongue midline   * Motor  Muscle bulk normal, strength 5/5 throughout bilateral UE    3/5 L dorsiflexion, 4-/5 L eversion and inversion    * Sensory   Intact to temperature    Absent vibratory sensation to bilateral ankles    Significantly decreased at knees    * Coordination  No dysmetria with finger-to-nose or heel-to-shin   * Gait  See below.   * Deep tendon reflexes  2+ and symmetric throughout UE    Diminished reflexes bilateral LE    Babinski downgoing bilaterally   * Specialized movement exam  No hypophonic speech.    No facial masking.   No cogwheel rigidity.     No bradykinesia.   No other dystonia, chorea, " athetosis, myoclonus, or tics.   No motor impersistence.   Normal-based gait.   No shortened stride length.   No abnormal arm swing.     No postural instability.         Mild resting tremor of R hand only on distraction    Bilateral posture and action tremor, high frequency      L foot drop on exam      Laboratory/Radiological:  - Results:  Clinical Support on 04/04/2025   Component Date Value Ref Range Status    BNP 04/04/2025 301 (H)  0 - 99 pg/mL Final    Magnesium  04/04/2025 2.1  1.6 - 2.6 mg/dL Final    TSH 04/04/2025 1.112  0.400 - 4.000 uIU/mL Final    Sodium 04/04/2025 136  136 - 145 mmol/L Final    Potassium 04/04/2025 4.6  3.5 - 5.1 mmol/L Final    Chloride 04/04/2025 104  95 - 110 mmol/L Final    CO2 04/04/2025 25  23 - 29 mmol/L Final    Glucose 04/04/2025 103  70 - 110 mg/dL Final    BUN 04/04/2025 13  8 - 23 mg/dL Final    Creatinine 04/04/2025 0.8  0.5 - 1.4 mg/dL Final    Calcium 04/04/2025 10.1  8.7 - 10.5 mg/dL Final    Protein Total 04/04/2025 7.3  6.0 - 8.4 gm/dL Final    Albumin 04/04/2025 3.6  3.5 - 5.2 g/dL Final    Bilirubin Total 04/04/2025 1.1 (H)  0.1 - 1.0 mg/dL Final    ALP 04/04/2025 93  40 - 150 unit/L Final    AST 04/04/2025 14  11 - 45 unit/L Final    ALT 04/04/2025 10  10 - 44 unit/L Final    Anion Gap 04/04/2025 7 (L)  8 - 16 mmol/L Final    eGFR 04/04/2025 >60  >60 mL/min/1.73/m2 Final    WBC 04/04/2025 6.83  3.90 - 12.70 K/uL Final    RBC 04/04/2025 3.99 (L)  4.60 - 6.20 M/uL Final    HGB 04/04/2025 13.0 (L)  14.0 - 18.0 gm/dL Final    HCT 04/04/2025 39.7 (L)  40.0 - 54.0 % Final    MCV 04/04/2025 100 (H)  82 - 98 fL Final    MCH 04/04/2025 32.6 (H)  27.0 - 31.0 pg Final    MCHC 04/04/2025 32.7  32.0 - 36.0 g/dL Final    RDW 04/04/2025 12.4  11.5 - 14.5 % Final    Platelet Count 04/04/2025 278  150 - 450 K/uL Final    MPV 04/04/2025 10.0  9.2 - 12.9 fL Final    Nucleated RBC 04/04/2025 0  <=0 /100 WBC Final    Neut % 04/04/2025 46.1  38 - 73 % Final    Lymph % 04/04/2025 34.1   18 - 48 % Final    Mono % 04/04/2025 17.3 (H)  4 - 15 % Final    Eos % 04/04/2025 1.2  <=8 % Final    Basophil % 04/04/2025 1.0  <=1.9 % Final    Imm Grans % 04/04/2025 0.3  0.0 - 0.5 % Final    Neut # 04/04/2025 3.15  1.8 - 7.7 K/uL Final    Lymph # 04/04/2025 2.33  1 - 4.8 K/uL Final    Mono # 04/04/2025 1.18 (H)  0.3 - 1 K/uL Final    Eos # 04/04/2025 0.08  <=0.5 K/uL Final    Baso # 04/04/2025 0.07  <=0.2 K/uL Final    Imm Grans # 04/04/2025 0.02  0.00 - 0.04 K/uL Final   Admission on 03/16/2025, Discharged on 03/17/2025   Component Date Value Ref Range Status    POC Rapid COVID 03/16/2025 Negative  Negative Final     Acceptable 03/16/2025 Yes   Final    Influenza A, Molecular 03/16/2025 Positive (A)  Negative Final    Influenza B, Molecular 03/16/2025 Negative  Negative Final    Flu A & B Source 03/16/2025 Nasal Swab   Final    WBC 03/16/2025 7.14  3.90 - 12.70 K/uL Final    RBC 03/16/2025 3.60 (L)  4.60 - 6.20 M/uL Final    Hemoglobin 03/16/2025 12.2 (L)  14.0 - 18.0 g/dL Final    Hematocrit 03/16/2025 35.7 (L)  40.0 - 54.0 % Final    MCV 03/16/2025 99 (H)  82 - 98 fL Final    MCH 03/16/2025 33.9 (H)  27.0 - 31.0 pg Final    MCHC 03/16/2025 34.2  32.0 - 36.0 g/dL Final    RDW 03/16/2025 12.5  11.5 - 14.5 % Final    Platelets 03/16/2025 131 (L)  150 - 450 K/uL Final    MPV 03/16/2025 10.5  9.2 - 12.9 fL Final    Immature Granulocytes 03/16/2025 0.3  0.0 - 0.5 % Final    Gran # (ANC) 03/16/2025 4.7  1.8 - 7.7 K/uL Final    Immature Grans (Abs) 03/16/2025 0.02  0.00 - 0.04 K/uL Final    Lymph # 03/16/2025 1.0  1.0 - 4.8 K/uL Final    Mono # 03/16/2025 1.5 (H)  0.3 - 1.0 K/uL Final    Eos # 03/16/2025 0.0  0.0 - 0.5 K/uL Final    Baso # 03/16/2025 0.02  0.00 - 0.20 K/uL Final    nRBC 03/16/2025 0  0 /100 WBC Final    Gran % 03/16/2025 65.7  38.0 - 73.0 % Final    Lymph % 03/16/2025 13.3 (L)  18.0 - 48.0 % Final    Mono % 03/16/2025 20.4 (H)  4.0 - 15.0 % Final    Eosinophil % 03/16/2025 0.0  0.0 -  8.0 % Final    Basophil % 03/16/2025 0.3  0.0 - 1.9 % Final    Differential Method 03/16/2025 Automated   Final    Sodium 03/16/2025 131 (L)  136 - 145 mmol/L Final    Potassium 03/16/2025 4.1  3.5 - 5.1 mmol/L Final    Chloride 03/16/2025 100  95 - 110 mmol/L Final    CO2 03/16/2025 22 (L)  23 - 29 mmol/L Final    Glucose 03/16/2025 128 (H)  70 - 110 mg/dL Final    BUN 03/16/2025 13  8 - 23 mg/dL Final    Creatinine 03/16/2025 0.8  0.5 - 1.4 mg/dL Final    Calcium 03/16/2025 9.3  8.7 - 10.5 mg/dL Final    Total Protein 03/16/2025 6.5  6.0 - 8.4 g/dL Final    Albumin 03/16/2025 3.4 (L)  3.5 - 5.2 g/dL Final    Total Bilirubin 03/16/2025 1.6 (H)  0.1 - 1.0 mg/dL Final    Alkaline Phosphatase 03/16/2025 76  40 - 150 U/L Final    AST 03/16/2025 31  10 - 40 U/L Final    ALT 03/16/2025 24  10 - 44 U/L Final    eGFR 03/16/2025 >60  >60 mL/min/1.73 m^2 Final    Anion Gap 03/16/2025 9  8 - 16 mmol/L Final    CPK 03/16/2025 169  20 - 200 U/L Final    QRS Duration 03/16/2025 88  ms Final    [facility] QTC Calculation 03/16/2025 459  ms Final    Troponin I 03/16/2025 0.016  0.000 - 0.026 ng/mL Final    BNP 03/16/2025 314 (H)  0 - 99 pg/mL Final    Sodium 03/17/2025 136  136 - 145 mmol/L Final    Potassium 03/17/2025 4.2  3.5 - 5.1 mmol/L Final    Chloride 03/17/2025 103  95 - 110 mmol/L Final    CO2 03/17/2025 21 (L)  23 - 29 mmol/L Final    Glucose 03/17/2025 104  70 - 110 mg/dL Final    BUN 03/17/2025 13  8 - 23 mg/dL Final    Creatinine 03/17/2025 0.7  0.5 - 1.4 mg/dL Final    Calcium 03/17/2025 9.4  8.7 - 10.5 mg/dL Final    Anion Gap 03/17/2025 12  8 - 16 mmol/L Final    eGFR 03/17/2025 >60  >60 mL/min/1.73 m^2 Final    Magnesium 03/17/2025 2.0  1.6 - 2.6 mg/dL Final    Phosphorus 03/17/2025 3.1  2.7 - 4.5 mg/dL Final    WBC 03/17/2025 6.73  3.90 - 12.70 K/uL Final    RBC 03/17/2025 3.86 (L)  4.60 - 6.20 M/uL Final    Hemoglobin 03/17/2025 12.7 (L)  14.0 - 18.0 g/dL Final    Hematocrit 03/17/2025 39.8 (L)  40.0 - 54.0 % Final     MCV 03/17/2025 103 (H)  82 - 98 fL Final    MCH 03/17/2025 32.9 (H)  27.0 - 31.0 pg Final    MCHC 03/17/2025 31.9 (L)  32.0 - 36.0 g/dL Final    RDW 03/17/2025 12.7  11.5 - 14.5 % Final    Platelets 03/17/2025 131 (L)  150 - 450 K/uL Final    MPV 03/17/2025 11.6  9.2 - 12.9 fL Final    Immature Granulocytes 03/17/2025 0.4  0.0 - 0.5 % Final    Gran # (ANC) 03/17/2025 4.3  1.8 - 7.7 K/uL Final    Immature Grans (Abs) 03/17/2025 0.03  0.00 - 0.04 K/uL Final    Lymph # 03/17/2025 1.2  1.0 - 4.8 K/uL Final    Mono # 03/17/2025 1.1 (H)  0.3 - 1.0 K/uL Final    Eos # 03/17/2025 0.0  0.0 - 0.5 K/uL Final    Baso # 03/17/2025 0.03  0.00 - 0.20 K/uL Final    nRBC 03/17/2025 0  0 /100 WBC Final    Gran % 03/17/2025 64.6  38.0 - 73.0 % Final    Lymph % 03/17/2025 17.4 (L)  18.0 - 48.0 % Final    Mono % 03/17/2025 16.9 (H)  4.0 - 15.0 % Final    Eosinophil % 03/17/2025 0.3  0.0 - 8.0 % Final    Basophil % 03/17/2025 0.4  0.0 - 1.9 % Final    Differential Method 03/17/2025 Automated   Final       - Independent review of images:      - Independent review of consultant's notes: Leonardo Corral     ASSESSMENT/PLAN:  Abnormal gait   - reports localized pain to the L knee, has been worked up by ortho -- comes and goes, unable to bear weight on LLE whenever this occurs  - relieved by flexing knee and pulling back on ankle, possibly by stretching quad? L3 radiculopathy?   - history of lumbar stenosis s/p surgery x2, L foot drop -- weak dorsiflexion, eversion and inversion   - pain does not radiate  - diminished reflexes bilateral LE, absent vibratory sensation to bilateral ankles, significantly decreased at the knees   - neuropathy labs, EMG       The patient has a documented history of afib, htn, hyperlipidemia and/or hypercholesteremia with long-term complications such as cerebrovascular disease, peripheral vascular disease, and/or aortic atherosclerosis. Collectively these risk factors may contribute to cerebral atherosclerosis, and  cerebral hypoperfusion compounded neurocognitive disorder. Rec'd maximizing cerebrovascular-related medical therapy, including but not limited to cholesterol medications and antiplatelet agents. Rec'd controlling vascular risk factors like hypertension, hyperlipidemia, and Diabetes SBP<130, LDL<100, and A1C<7.0. Rec'd optimizing lifestyle choices, including a heart-healthy diet (e.g., Mediterranean or DASH), increased cardiovascular exercise (goal 150 minutes of moderate-intensity per week), and staying cognitively and socially active.      Orders Placed This Encounter    Vitamin B6    Vitamin B12 Deficiency Panel    Copper, Serum    Zinc    Protein Electrophoresis, Serum    Vitamin B1    IMMUNOFIXATION ELECTROPHORESIS, SERUM    EMG W/ ULTRASOUND AND NERVE CONDUCTION TEST 2 Extremities         Follow up: 3 months     This is a patient with a complex neurologic diagnosis whose overall, ongoing care is being managed and monitored by me and our Neurology clinic.   As such, since 2024,  is the appropriate add-on code to accompany the other E/M billing for this visit.      Collaborating Physician, Dr. Fagan, was available during today's encounter. Any change to plan along with cosign to appear in the EMR.       Total time spent with the patient: 73 minutes.  61 minutes of face-to-face consultation and 12 minutes of chart review and coordination of care, on the day of the visit. This includes face to face time and non-face to face time preparing to see the patient (eg, review of tests), obtaining and/or reviewing separately obtained history, documenting clinical information in the electronic or other health record, independently interpreting resultsand communicating results to the patient/family/caregiver, or care coordination.         Elida Castellanos PA-C   [facility]  Department of Neurology  Movement Disorders             [1]   Current Outpatient Medications on File Prior to Visit   Medication Sig  Dispense Refill    ALPRAZolam (XANAX) 0.25 MG tablet Take 1 tablet (0.25 mg total) by mouth 2 (two) times daily as needed for Anxiety or Insomnia. 45 tablet 2    ascorbic acid, vitamin C, (VITAMIN C) 250 MG tablet Take 250 mg by mouth once daily.      calcium citrate (CALCITRATE) 200 mg (950 mg) tablet Take 2 tablets by mouth once daily.      gabapentin (NEURONTIN) 300 MG capsule TAKE 1 CAPSULE BY MOUTH 3 TIMES DAILY AS NEEDED 90 capsule 3    Lactobac no.41/Bifidobact no.7 (PROBIOTIC-10 ORAL) Take by mouth.      metoprolol succinate (TOPROL-XL) 25 MG 24 hr tablet Take 1 tablet (25 mg total) by mouth 2 (two) times daily. 60 tablet 2    simvastatin (ZOCOR) 20 MG tablet Take 1 tablet (20 mg total) by mouth every evening. 90 tablet 3    suvorexant (BELSOMRA) 15 mg Tab Take 1 tablet by mouth nightly as needed. 28 tablet 0    tamsulosin (FLOMAX) 0.4 mg Cap Take 1 capsule (0.4 mg total) by mouth every evening. 90 capsule 1    verapamiL (CALAN-SR) 240 MG CR tablet Take 1 tablet (240 mg total) by mouth once daily. 90 tablet 1    vitamin D (VITAMIN D3) 1000 units Tab Take 1,000 Units by mouth once daily.      XARELTO 20 mg Tab TAKE 1 TABLET BY MOUTH EVERY DAY WITH DINNER OR EVENING MEAL 90 tablet 3     No current facility-administered medications on file prior to visit.

## 2025-05-28 ENCOUNTER — LAB VISIT (OUTPATIENT)
Dept: LAB | Facility: HOSPITAL | Age: 85
End: 2025-05-28
Payer: MEDICARE

## 2025-05-28 ENCOUNTER — TELEPHONE (OUTPATIENT)
Facility: CLINIC | Age: 85
End: 2025-05-28

## 2025-05-28 ENCOUNTER — OFFICE VISIT (OUTPATIENT)
Facility: CLINIC | Age: 85
End: 2025-05-28
Payer: MEDICARE

## 2025-05-28 VITALS
WEIGHT: 183 LBS | SYSTOLIC BLOOD PRESSURE: 130 MMHG | HEIGHT: 71 IN | HEART RATE: 81 BPM | BODY MASS INDEX: 25.62 KG/M2 | DIASTOLIC BLOOD PRESSURE: 86 MMHG

## 2025-05-28 DIAGNOSIS — G62.9 NEUROPATHY: ICD-10-CM

## 2025-05-28 DIAGNOSIS — I10 PRIMARY HYPERTENSION: Chronic | ICD-10-CM

## 2025-05-28 DIAGNOSIS — G60.9 HEREDITARY AND IDIOPATHIC NEUROPATHY, UNSPECIFIED: ICD-10-CM

## 2025-05-28 DIAGNOSIS — I70.0 AORTIC ATHEROSCLEROSIS: Chronic | ICD-10-CM

## 2025-05-28 DIAGNOSIS — E78.5 HYPERLIPIDEMIA, UNSPECIFIED HYPERLIPIDEMIA TYPE: Chronic | ICD-10-CM

## 2025-05-28 DIAGNOSIS — I48.21 PERMANENT ATRIAL FIBRILLATION WITH RVR: ICD-10-CM

## 2025-05-28 DIAGNOSIS — R20.8 DECREASED SENSE OF VIBRATION: ICD-10-CM

## 2025-05-28 DIAGNOSIS — M21.372 FOOT DROP, LEFT FOOT: ICD-10-CM

## 2025-05-28 DIAGNOSIS — M25.562 CHRONIC PAIN OF LEFT KNEE: Primary | ICD-10-CM

## 2025-05-28 DIAGNOSIS — G89.29 CHRONIC PAIN OF LEFT KNEE: Primary | ICD-10-CM

## 2025-05-28 DIAGNOSIS — M54.10 RADICULOPATHY OF LEG: ICD-10-CM

## 2025-05-28 PROBLEM — J96.01 ACUTE HYPOXEMIC RESPIRATORY FAILURE: Status: RESOLVED | Noted: 2025-03-16 | Resolved: 2025-05-28

## 2025-05-28 PROCEDURE — 36415 COLL VENOUS BLD VENIPUNCTURE: CPT

## 2025-05-28 PROCEDURE — 84425 ASSAY OF VITAMIN B-1: CPT

## 2025-05-28 PROCEDURE — 82525 ASSAY OF COPPER: CPT

## 2025-05-28 PROCEDURE — 99213 OFFICE O/P EST LOW 20 MIN: CPT | Mod: PBBFAC | Performed by: PHYSICIAN ASSISTANT

## 2025-05-28 PROCEDURE — 84630 ASSAY OF ZINC: CPT

## 2025-05-28 PROCEDURE — 84165 PROTEIN E-PHORESIS SERUM: CPT

## 2025-05-28 PROCEDURE — 84207 ASSAY OF VITAMIN B-6: CPT

## 2025-05-28 PROCEDURE — 86334 IMMUNOFIX E-PHORESIS SERUM: CPT

## 2025-05-28 PROCEDURE — 99999 PR PBB SHADOW E&M-EST. PATIENT-LVL III: CPT | Mod: PBBFAC,,, | Performed by: PHYSICIAN ASSISTANT

## 2025-05-29 LAB
ALBUMIN, SPE (OHS): 3.89 G/DL (ref 3.35–5.55)
ALPHA 1 GLOB (OHS): 0.32 GM/DL (ref 0.17–0.41)
ALPHA 2 GLOB (OHS): 0.73 GM/DL (ref 0.43–0.99)
BETA GLOB (OHS): 0.73 GM/DL (ref 0.5–1.1)
GAMMA GLOBULIN (OHS): 1.34 GM/DL (ref 0.67–1.58)
PATHOLOGIST INTERPRETATION - IFE SERUM (OHS): NORMAL
PATHOLOGIST REVIEW - SPE (OHS): NORMAL
PROT SERPL-MCNC: 7 GM/DL (ref 6–8.4)

## 2025-05-30 ENCOUNTER — RESULTS FOLLOW-UP (OUTPATIENT)
Dept: INTERNAL MEDICINE | Facility: CLINIC | Age: 85
End: 2025-05-30

## 2025-05-30 ENCOUNTER — TELEPHONE (OUTPATIENT)
Facility: CLINIC | Age: 85
End: 2025-05-30
Payer: MEDICARE

## 2025-05-30 LAB — VIT B12 SERPL-MCNC: 535 NG/L (ref 180–914)

## 2025-05-30 RX ORDER — VERAPAMIL HCL 240 MG
240 TABLET, EXTENDED RELEASE ORAL DAILY
Qty: 90 TABLET | Refills: 1 | Status: SHIPPED | OUTPATIENT
Start: 2025-05-30 | End: 2026-05-30

## 2025-05-30 NOTE — TELEPHONE ENCOUNTER
Care Due:                  Date            Visit Type   Department     Provider  --------------------------------------------------------------------------------                                             TARA LAU                              [facility] INTERNAL  Last Visit: 05-      AUDIO ONLY   MEDICINE       Maria Isabel Corral  Next Visit: None Scheduled  None         None Found                                                            Last  Test          Frequency    Reason                     Performed    Due Date  --------------------------------------------------------------------------------    Lipid Panel.  12 months..  simvastatin..............  08- 08-    [facility] Embedded Care Due Messages. Reference number: [id number].   5/30/2025 10:51:28 AM CDT

## 2025-06-02 LAB — W ZINC: 57 UG/DL

## 2025-06-02 RX ORDER — VERAPAMIL HCL 240 MG
240 TABLET, EXTENDED RELEASE ORAL DAILY
Qty: 90 TABLET | Refills: 1 | Status: SHIPPED | OUTPATIENT
Start: 2025-06-02 | End: 2026-06-02

## 2025-06-03 LAB
W COPPER: 1586 UG/L
W VITAMIN B1: 40 UG/L
W VITAMIN B6: 3 UG/L

## 2025-06-10 ENCOUNTER — PATIENT MESSAGE (OUTPATIENT)
Facility: CLINIC | Age: 85
End: 2025-06-10
Payer: MEDICARE

## 2025-06-12 ENCOUNTER — TELEPHONE (OUTPATIENT)
Dept: INTERNAL MEDICINE | Facility: CLINIC | Age: 85
End: 2025-06-12
Payer: MEDICARE

## 2025-06-12 NOTE — TELEPHONE ENCOUNTER
----- Message from Maria Isabel Corral MD sent at 6/10/2025 10:27 AM CDT -----  Jonel ,   Your vitamin B 6 is very low .  This could affect is in your legs.  You need to get over-the-counter B6 50 mg plus I would like you to get a B complex and take both 1 a day and we will need to   follow-up on this in 3 months or so because folks can actually get too much B6 unlike the other B vitamins which can not get too much since they are water-soluble.  CARLOS ENRIQUE WE YOU CALL HIM AND   EXPLAINED THE SUPPLEMENTS THAT HE NEEDS TO REVIEW HIS LABS WITH HIM PLEASE  ----- Message -----  From: Lab, Background User  Sent: 5/29/2025   1:31 PM CDT  To: Maria Isabel Corral MD

## 2025-06-23 DIAGNOSIS — H53.9 VISUAL CHANGES: Primary | ICD-10-CM

## 2025-06-24 ENCOUNTER — TELEPHONE (OUTPATIENT)
Dept: OPHTHALMOLOGY | Facility: CLINIC | Age: 85
End: 2025-06-24
Payer: MEDICARE

## 2025-06-24 NOTE — TELEPHONE ENCOUNTER
----- Message from Natalie sent at 6/23/2025 11:39 AM CDT -----  Regarding: referral appt request  Pt has referral for:    [facility] and one of our original  has not had an eye exam in years since cataract surgery. Can we call him to arrange a routine eye exam. He had some complaints    he saw  in 2020     Please call pt to schedule. Thank you

## 2025-06-25 ENCOUNTER — PROCEDURE VISIT (OUTPATIENT)
Facility: CLINIC | Age: 85
End: 2025-06-25
Payer: MEDICARE

## 2025-06-25 DIAGNOSIS — G60.9 HEREDITARY AND IDIOPATHIC NEUROPATHY, UNSPECIFIED: ICD-10-CM

## 2025-06-25 DIAGNOSIS — G62.9 NEUROPATHY: ICD-10-CM

## 2025-06-25 PROCEDURE — 95909 NRV CNDJ TST 5-6 STUDIES: CPT | Mod: PBBFAC | Performed by: PSYCHIATRY & NEUROLOGY

## 2025-06-25 PROCEDURE — 95885 MUSC TST DONE W/NERV TST LIM: CPT | Mod: PBBFAC | Performed by: PSYCHIATRY & NEUROLOGY

## 2025-06-25 NOTE — PROCEDURES
[facility]  EMG LAB  [address] [place] [address]     Patient: Oliver Burk YOB: 1940   Patient ID: [id number] Age: 84 Years   Sex: Male Referring MD: Elida Castellanos PA-C   Height: 5 feet 11 inch Examining MD: Trinidad Daniels MD   Weight: 183 lbs Technician: DARCY Delcid      The indication for the examination is evaluation for gait abnormality.  History is significant for two previous lumbar spine surgeries for spinal stenosis. +Left knee instability.  No associated sensory symptoms.  No history of diabetes.  Vitamin B low at 3.  The remainder of neuropathy labs was within normal limits.     Summary:  Nerve conduction studies were performed on the sural, peroneal, and posterior tibial nerves.    ·      No responses were obtained for the bilateral sural nerves.    ·      No responses were obtained for the bilateral peroneal motor nerves with conventional recording site at the EDB.  When the recording site was moved to the anterior tibialis muscle, responses were elicited; however, the amplitudes of motor unit potentials were reduced, left side more than the right.  ·      No responses were obtained for the right tibial motor nerve.  ·      The left tibial motor nerve had markedly reduced amplitudes.     EMG was performed on select muscles of the lower extremities.  The lumbosacral paraspinals were not examined due to his prior spine surgeries and also because he is taking an anticoagulant.  There was increased insertional and abnormal spontaneous activity in the left > right L4-5, L5-S1 myotomes.  Motor unit potentials had polyphasic, increased amplitudes.  The remaining of examined muscles appeared normal as shown in the table below.     Impression:     1.     Chronic L4-5, L5-S1 radiculopathy, left side worse than the right  2.     Sensorimotor polyneuropathy, axonal type, of the lower extremities          ___________________________________  Trinidad Daniels MD, FAAN                Sensory NCS      Nerve / Sites Rec. Site Onset Lat Peak Lat Ref. NP Amp Ref. Distance Velocity Ref. Temp. TC Tristan       ms ms ms µV µV cm m/s m/s °C m/s   L Sural - Ankle (Calf)      Calf Ankle NR NR <=4.40 NR >=6.0 14 NR >=40 29.9 NR   R Sural - Ankle (Calf)      Calf Ankle NR NR <=4.40 NR >=6.0 14 NR >=40 28.9 NR       Motor NCS      Nerve / Sites Muscle Latency Ref. Amplitude Ref. Segments Distance Velocity Temp. TC Tristan       ms ms mV mV   cm m/s °C m/s   L Peroneal - EDB      Ankle EDB NR <=6.50 NR >=2.0 Ankle - EDB 9   29.1     R Peroneal - EDB      Ankle EDB NR <=6.50 NR >=2.0 Ankle - EDB 9   28.9     L Peroneal - Tib Ant      Fib Head Tib Ant 6.94 <=6.70 1.1 >=3.0 Fib Head - Tib Ant 9 13 29.4 22      Pop fossa Tib Ant 12.02   1.1   Pop fossa - Fib Head 9.5 19 29.5 27   R Peroneal - Tib Ant      Fib Head Tib Ant 7.46 <=6.70 2.7 >=3.0 Fib Head - Tib Ant 9 12 28.9 22      Pop fossa Tib Ant 11.63   2.1   Pop fossa - Fib Head 9.5 23 28.9 32   L Tibial - AH      Ankle AH 5.77 <=5.80 0.4 >=4.0 Ankle - AH 9   29.5     R Tibial - AH      Ankle AH NR <=5.80 NR >=4.0 Ankle - AH 9   28.8                        EMG Summary Table       Spontaneous MUAP Recruitment   Muscle IA Fib PSW Fasc Amp Dur PPP Activation Pattern   L. Vastus lateralis N None None None N N N Normal Normal   R. Vastus lateralis N None None None N N N Normal Normal   L. Peroneus longus Inc None 1-2+ None N N N Normal Normal   R. Peroneus longus Inc None None None N N N Normal Normal   L. Gastrocnemius (Medial head) Inc None None None N N N Normal Normal   R. Gastrocnemius (Medial head) Inc None None None N N N Normal Normal   L. Tibialis anterior Inc None 2+ None Inc N Inc Normal Normal   R. Tibialis anterior Inc None 1+ None Inc N Inc Normal Normal   L. Adductor longus N None None None N N N Normal Normal

## 2025-07-03 PROBLEM — I27.20 PULMONARY HYPERTENSION: Status: ACTIVE | Noted: 2025-07-03

## 2025-07-03 NOTE — PROGRESS NOTES
Subjective:     HPI    I had the pleasure of seeing Oliver Burk  in follow-up today for his history of PPM in situ. Former pt of Dr. Barriga, last seen by him in 9/2024, seeing me for the first time today.    He is an 84M with HTN, HLD, BPH, spinal stenosis complicated by bilateral foot drop, and persistent AF, who underwent RAINER/DCCV in 10/2022. No qualitative improvement in how he felt post-cardioversion.  Per [employer] monitor, NSR lasted around 2 days.    Echo in 9/2023 showed EF 35%--> reviewed with Dr. Stallworth who thought EF closer to 55% with moderate MR. Echo in 9/2024 showed EF 55-60%.    Pt currently on xarelto, toprol xl mg bid, verapamil 240 mg daily.    My interpretation of today's ECG is AF at 70 bpm.    Review of Systems   Constitutional: Positive for malaise/fatigue.   HENT: Negative.  Negative for ear pain and tinnitus.    Eyes:  Negative for blurred vision.   Cardiovascular: Negative.  Negative for chest pain, dyspnea on exertion, near-syncope, palpitations and syncope.   Respiratory: Negative.  Negative for shortness of breath.    Endocrine: Negative.  Negative for polyuria.   Hematologic/Lymphatic: Does not bruise/bleed easily.   Skin: Negative.  Negative for rash.   Musculoskeletal: Negative.  Negative for joint pain and muscle weakness.   Gastrointestinal: Negative.  Negative for abdominal pain and change in bowel habit.   Genitourinary:  Negative for frequency.   Neurological: Negative.  Negative for dizziness and weakness. Focal weakness: bilateral foot drop.  Psychiatric/Behavioral: Negative.  Negative for depression. The patient is not nervous/anxious.    Allergic/Immunologic: Negative for environmental allergies.        Objective:    Physical Exam  Vitals and nursing note reviewed.   Constitutional:       Appearance: He is well-developed.   HENT:      Head: Normocephalic and atraumatic.   Eyes:      General: Lids are normal. No scleral icterus.     Conjunctiva/sclera: Conjunctivae normal.    Neck:      Thyroid: No thyromegaly.      Vascular: No JVD.      Trachea: No tracheal deviation.   Cardiovascular:      Rate and Rhythm: Normal rate. Rhythm irregular.      Pulses:           Radial pulses are 2+ on the right side and 2+ on the left side.   Pulmonary:      Effort: Pulmonary effort is normal. No tachypnea, accessory muscle usage or respiratory distress.      Breath sounds: No wheezing.   Abdominal:      General: There is no distension.   Musculoskeletal:         General: Normal range of motion.      Cervical back: Normal range of motion.   Skin:     General: Skin is warm and dry.   Neurological:      Mental Status: He is alert and oriented to person, place, and time.      Motor: No abnormal muscle tone.      Deep Tendon Reflexes: Reflexes are normal and symmetric.   Psychiatric:         Behavior: Behavior normal.           Assessment:       1. Pulmonary hypertension    2. Permanent atrial fibrillation with RVR    3. Primary hypertension    4. Mixed hyperlipidemia    5. ROMEO (obstructive sleep apnea)    6. Chronic atrial fibrillation         Plan:       In summary, Oliver Burk Jr. is an 84M with permanent AF, preserved EF.     Plan is continue with rate control strategy, continue anticoagulation.    Pt is very motivated to simplify his medical regimen. For now will reduce toprol to 25 mg qhs, follow-up 6 months. Pt has also been instructed to contact me in next couple weeks with resting HRs from his [employer]--we may continue to downtitrate meds before next visit depending on his trends.    Thank you for allowing me to participate in the care of this patient. Please do not hesitate to call me with any questions or concerns.

## 2025-07-07 ENCOUNTER — HOSPITAL ENCOUNTER (OUTPATIENT)
Dept: CARDIOLOGY | Facility: CLINIC | Age: 85
Discharge: HOME | End: 2025-07-07
Payer: MEDICARE

## 2025-07-07 ENCOUNTER — OFFICE VISIT (OUTPATIENT)
Dept: ELECTROPHYSIOLOGY | Facility: CLINIC | Age: 85
End: 2025-07-07
Payer: MEDICARE

## 2025-07-07 ENCOUNTER — RESULTS FOLLOW-UP (OUTPATIENT)
Dept: INTERNAL MEDICINE | Facility: CLINIC | Age: 85
End: 2025-07-07

## 2025-07-07 VITALS
HEIGHT: 71 IN | BODY MASS INDEX: 26.67 KG/M2 | SYSTOLIC BLOOD PRESSURE: 126 MMHG | WEIGHT: 190.5 LBS | HEART RATE: 70 BPM | DIASTOLIC BLOOD PRESSURE: 63 MMHG

## 2025-07-07 DIAGNOSIS — I48.91 ATRIAL FIBRILLATION BY ELECTROCARDIOGRAM: ICD-10-CM

## 2025-07-07 DIAGNOSIS — I27.20 PULMONARY HYPERTENSION: Primary | ICD-10-CM

## 2025-07-07 DIAGNOSIS — I10 PRIMARY HYPERTENSION: Chronic | ICD-10-CM

## 2025-07-07 DIAGNOSIS — G47.33 OSA (OBSTRUCTIVE SLEEP APNEA): Chronic | ICD-10-CM

## 2025-07-07 DIAGNOSIS — E78.2 MIXED HYPERLIPIDEMIA: Chronic | ICD-10-CM

## 2025-07-07 DIAGNOSIS — I48.21 PERMANENT ATRIAL FIBRILLATION WITH RVR: ICD-10-CM

## 2025-07-07 DIAGNOSIS — I48.20 CHRONIC ATRIAL FIBRILLATION: ICD-10-CM

## 2025-07-07 LAB
OHS QRS DURATION: 90 MS
OHS QTC CALCULATION: 455 MS

## 2025-07-07 PROCEDURE — 93005 ELECTROCARDIOGRAM TRACING: CPT | Mod: PBBFAC | Performed by: STUDENT IN AN ORGANIZED HEALTH CARE EDUCATION/TRAINING PROGRAM

## 2025-07-07 PROCEDURE — 99215 OFFICE O/P EST HI 40 MIN: CPT | Mod: S$PBB,,, | Performed by: INTERNAL MEDICINE

## 2025-07-07 PROCEDURE — 99213 OFFICE O/P EST LOW 20 MIN: CPT | Mod: PBBFAC,25 | Performed by: INTERNAL MEDICINE

## 2025-07-07 PROCEDURE — 93010 ELECTROCARDIOGRAM REPORT: CPT | Mod: S$PBB,,, | Performed by: STUDENT IN AN ORGANIZED HEALTH CARE EDUCATION/TRAINING PROGRAM

## 2025-07-07 PROCEDURE — 99999 PR PBB SHADOW E&M-EST. PATIENT-LVL III: CPT | Mod: PBBFAC,,, | Performed by: INTERNAL MEDICINE

## 2025-07-07 RX ORDER — METOPROLOL SUCCINATE 25 MG/1
25 TABLET, EXTENDED RELEASE ORAL NIGHTLY
Qty: 90 TABLET | Refills: 3 | Status: SHIPPED | OUTPATIENT
Start: 2025-07-07 | End: 2026-07-07

## 2025-07-24 DIAGNOSIS — N40.0 BENIGN PROSTATIC HYPERPLASIA, UNSPECIFIED WHETHER LOWER URINARY TRACT SYMPTOMS PRESENT: ICD-10-CM

## 2025-07-24 RX ORDER — TAMSULOSIN HYDROCHLORIDE 0.4 MG/1
1 CAPSULE ORAL NIGHTLY
Qty: 90 CAPSULE | Refills: 1 | Status: SHIPPED | OUTPATIENT
Start: 2025-07-24

## 2025-07-24 NOTE — TELEPHONE ENCOUNTER
No care due was identified.  [facility] Embedded Care Due Messages. Reference number: [id number].   7/24/2025 2:01:36 PM CDT

## 2025-07-26 ENCOUNTER — OFFICE VISIT (OUTPATIENT)
Dept: URGENT CARE | Facility: CLINIC | Age: 85
End: 2025-07-26
Payer: MEDICARE

## 2025-07-26 VITALS
TEMPERATURE: 98 F | HEART RATE: 105 BPM | SYSTOLIC BLOOD PRESSURE: 147 MMHG | BODY MASS INDEX: 26.6 KG/M2 | HEIGHT: 71 IN | RESPIRATION RATE: 18 BRPM | OXYGEN SATURATION: 95 % | WEIGHT: 190 LBS | DIASTOLIC BLOOD PRESSURE: 81 MMHG

## 2025-07-26 DIAGNOSIS — S80.812A ABRASION, LEFT LOWER LEG, INITIAL ENCOUNTER: ICD-10-CM

## 2025-07-26 DIAGNOSIS — L03.116 CELLULITIS OF LEFT LEG: Primary | ICD-10-CM

## 2025-07-26 PROCEDURE — 99214 OFFICE O/P EST MOD 30 MIN: CPT | Mod: 25,S$GLB,, | Performed by: NURSE PRACTITIONER

## 2025-07-26 PROCEDURE — 90715 TDAP VACCINE 7 YRS/> IM: CPT | Mod: S$GLB,,, | Performed by: NURSE PRACTITIONER

## 2025-07-26 PROCEDURE — 90471 IMMUNIZATION ADMIN: CPT | Mod: S$GLB,,, | Performed by: NURSE PRACTITIONER

## 2025-07-26 RX ORDER — CEPHALEXIN 500 MG/1
500 CAPSULE ORAL EVERY 12 HOURS
Qty: 14 CAPSULE | Refills: 0 | Status: SHIPPED | OUTPATIENT
Start: 2025-07-26 | End: 2025-08-02

## 2025-07-26 NOTE — PROGRESS NOTES
"Subjective:      Patient ID: Oliver Burk Jr. is a 85 y.o. male.    Vitals:  height is 5' 11" (1.803 m) and weight is 86.2 kg (190 lb). His oral temperature is 97.8 °F (36.6 °C). His blood pressure is 147/81 (abnormal) and his pulse is 105. His respiration is 18 and oxygen saturation is 95%.     Chief Complaint: Leg Pain    Pt is a 85 year old male presenting with complaint of L medial lower leg pain, swelling, warmth, and redness. Onset 5 days ago when a gondola door hit him in [place]. Pt has flown twice since then. Pt denies a laceration, numbness, tingling. Nothing applied, no meds taken. Pt has elevated his leg. Tetanus unknown.     Provider note begins below    Review of chart shows last tetanus vaccination was in .  Patient is on Xarelto.  Possibly had an abrasion that went unnoticed.    Leg Pain   The incident occurred 5 to 7 days ago. [place] The injury mechanism was a direct blow. The pain is present in the left leg. Pertinent negatives include no numbness or tingling. The symptoms are aggravated by weight bearing. He has tried elevation for the symptoms.     Constitution: Negative for sweating, fatigue and fever.   Cardiovascular:  Positive for leg swelling.   Respiratory:  Negative for cough and shortness of breath.    Skin:  Positive for rash, abrasion and erythema.   Neurological:  Negative for numbness.      Objective:     Physical Exam   Constitutional: He is oriented to person, place, and time.   HENT:   Head: Normocephalic and atraumatic.   Cardiovascular: Normal rate.   Pulmonary/Chest: Effort normal. No respiratory distress.   Abdominal: Normal appearance.   Musculoskeletal:         General: Signs of injury present.      Right lower le+ Pitting Edema present.      Left lower le+ Pitting Edema present.   Neurological: He is alert and oriented to person, place, and time.   Skin: Skin is dry. erythema        Psychiatric: His behavior is normal. Mood normal. "           Assessment:     1. Cellulitis of left leg    2. Abrasion, left lower leg, initial encounter        Plan:   Appears to have a central hematoma and erythema surrounding.  Suspect cellulitis   Antibiotics   Tdap updated   Follow up if not improving over the next 48 hours          Cellulitis of left leg  -     cephALEXin (KEFLEX) 500 MG capsule; Take 1 capsule (500 mg total) by mouth every 12 (twelve) hours. for 7 days  Dispense: 14 capsule; Refill: 0  -     Tdap (BOOSTRIX) vaccine injection 0.5 mL    Abrasion, left lower leg, initial encounter  -     Tdap (BOOSTRIX) vaccine injection 0.5 mL

## 2025-08-03 DIAGNOSIS — N40.0 BENIGN PROSTATIC HYPERPLASIA, UNSPECIFIED WHETHER LOWER URINARY TRACT SYMPTOMS PRESENT: ICD-10-CM

## 2025-08-03 RX ORDER — TAMSULOSIN HYDROCHLORIDE 0.4 MG/1
1 CAPSULE ORAL NIGHTLY
Qty: 90 CAPSULE | Refills: 1 | Status: SHIPPED | OUTPATIENT
Start: 2025-08-03

## 2025-08-10 DIAGNOSIS — E78.5 DYSLIPIDEMIA, GOAL LDL BELOW 100: ICD-10-CM

## 2025-08-10 DIAGNOSIS — N40.0 BENIGN PROSTATIC HYPERPLASIA, UNSPECIFIED WHETHER LOWER URINARY TRACT SYMPTOMS PRESENT: ICD-10-CM

## 2025-08-10 DIAGNOSIS — I48.0 PAROXYSMAL ATRIAL FIBRILLATION: ICD-10-CM

## 2025-08-10 RX ORDER — VERAPAMIL HYDROCHLORIDE 120 MG/1
CAPSULE, EXTENDED RELEASE ORAL
Qty: 90 CAPSULE | Refills: 3 | OUTPATIENT
Start: 2025-08-10

## 2025-08-11 RX ORDER — TAMSULOSIN HYDROCHLORIDE 0.4 MG/1
0.4 CAPSULE ORAL
Qty: 90 CAPSULE | Refills: 3 | Status: SHIPPED | OUTPATIENT
Start: 2025-08-11

## 2025-08-11 RX ORDER — SIMVASTATIN 20 MG/1
20 TABLET, FILM COATED ORAL NIGHTLY
Qty: 90 TABLET | Refills: 2 | Status: SHIPPED | OUTPATIENT
Start: 2025-08-11

## 2025-08-12 ENCOUNTER — TELEPHONE (OUTPATIENT)
Dept: INTERNAL MEDICINE | Facility: CLINIC | Age: 85
End: 2025-08-12
Payer: MEDICARE

## 2025-08-22 ENCOUNTER — TELEPHONE (OUTPATIENT)
Dept: INTERNAL MEDICINE | Facility: CLINIC | Age: 85
End: 2025-08-22
Payer: MEDICARE

## 2025-08-22 DIAGNOSIS — I48.19 PERSISTENT ATRIAL FIBRILLATION: Primary | ICD-10-CM

## 2025-08-22 DIAGNOSIS — R53.83 FATIGUE, UNSPECIFIED TYPE: ICD-10-CM

## 2025-09-02 ENCOUNTER — TELEPHONE (OUTPATIENT)
Facility: CLINIC | Age: 85
End: 2025-09-02
Payer: MEDICARE

## 2025-09-02 DIAGNOSIS — M21.372 FOOT DROP, LEFT FOOT: Primary | ICD-10-CM

## 2025-09-02 DIAGNOSIS — R27.8 SENSORY ATAXIA: ICD-10-CM

## (undated) DEVICE — GLOVE BIOGEL SKINSENSE PI 6.5

## (undated) DEVICE — Device

## (undated) DEVICE — CASSETTE INFINITI

## (undated) DEVICE — APPLICATOR CHLORAPREP ORN 26ML

## (undated) DEVICE — SOL BETADINE 5%

## (undated) DEVICE — SYR SLIP TIP 1CC

## (undated) DEVICE — TRAY MINOR GEN SURG OMC

## (undated) DEVICE — ADHESIVE MASTISOL VIAL 48/BX

## (undated) DEVICE — GOWN SURGICAL X-LARGE

## (undated) DEVICE — NDL HYPO REG 25G X 1 1/2

## (undated) DEVICE — SUT 2-0 VICRYL / SH (J417)

## (undated) DEVICE — DRESSING ADHESIVE ISLAND 3 X 6

## (undated) DEVICE — PAD CURAD NONADH 3X4IN

## (undated) DEVICE — GLOVE BIOGEL SKINSENSE PI 7.5

## (undated) DEVICE — SUT PROLENE 0 MO6 30IN BLUE

## (undated) DEVICE — DRAIN PENROSE XRAY 12 X 1/4 ST

## (undated) DEVICE — PAD DEFIB CADENCE ADULT R2

## (undated) DEVICE — GOWN POLY REINF BRTH SLV XL

## (undated) DEVICE — DRAPE LAP T SHT W/ INSTR PAD

## (undated) DEVICE — SUT MONOCYRL 4-0 PS2 UND

## (undated) DEVICE — DRESSING TRANS 4X4 TEGADERM

## (undated) DEVICE — ADHESIVE DERMABOND ADVANCED